# Patient Record
Sex: FEMALE | Race: BLACK OR AFRICAN AMERICAN | NOT HISPANIC OR LATINO | Employment: FULL TIME | URBAN - METROPOLITAN AREA
[De-identification: names, ages, dates, MRNs, and addresses within clinical notes are randomized per-mention and may not be internally consistent; named-entity substitution may affect disease eponyms.]

---

## 2018-03-12 ENCOUNTER — TRANSCRIBE ORDERS (OUTPATIENT)
Dept: ADMINISTRATIVE | Facility: HOSPITAL | Age: 48
End: 2018-03-12

## 2018-03-12 DIAGNOSIS — N63.0 BREAST LUMP: Primary | ICD-10-CM

## 2018-03-28 ENCOUNTER — HOSPITAL ENCOUNTER (OUTPATIENT)
Dept: MAMMOGRAPHY | Facility: HOSPITAL | Age: 48
Discharge: HOME | End: 2018-03-28

## 2018-03-28 ENCOUNTER — HOSPITAL ENCOUNTER (OUTPATIENT)
Dept: ULTRASOUND IMAGING | Facility: HOSPITAL | Age: 48
Discharge: HOME | End: 2018-03-28

## 2018-03-28 ENCOUNTER — HOSPITAL ENCOUNTER (OUTPATIENT)
Dept: MAMMOGRAPHY | Facility: HOSPITAL | Age: 48
Discharge: HOME | End: 2018-03-28
Attending: OBSTETRICS & GYNECOLOGY | Admitting: OBSTETRICS & GYNECOLOGY

## 2018-03-28 DIAGNOSIS — N63.0 BREAST LUMP: ICD-10-CM

## 2018-03-28 PROCEDURE — A4648 IMPLANTABLE TISSUE MARKER: HCPCS

## 2018-03-28 PROCEDURE — 88360 TUMOR IMMUNOHISTOCHEM/MANUAL: CPT | Performed by: OBSTETRICS & GYNECOLOGY

## 2018-03-28 PROCEDURE — 88342 IMHCHEM/IMCYTCHM 1ST ANTB: CPT | Performed by: OBSTETRICS & GYNECOLOGY

## 2018-03-28 PROCEDURE — 19084 BX BREAST ADD LESION US IMAG: CPT | Performed by: RADIOLOGY

## 2018-03-28 PROCEDURE — 19083 BX BREAST 1ST LESION US IMAG: CPT | Performed by: RADIOLOGY

## 2018-03-28 PROCEDURE — G0279 TOMOSYNTHESIS, MAMMO: HCPCS

## 2018-03-28 PROCEDURE — 88305 TISSUE EXAM BY PATHOLOGIST: CPT | Performed by: OBSTETRICS & GYNECOLOGY

## 2018-03-28 PROCEDURE — 76642 ULTRASOUND BREAST LIMITED: CPT

## 2018-03-28 PROCEDURE — 77066 DX MAMMO INCL CAD BI: CPT | Performed by: RADIOLOGY

## 2018-03-28 PROCEDURE — 77066 DX MAMMO INCL CAD BI: CPT

## 2018-03-28 PROCEDURE — 77062 BREAST TOMOSYNTHESIS BI: CPT | Performed by: RADIOLOGY

## 2018-03-28 PROCEDURE — 76642 ULTRASOUND BREAST LIMITED: CPT | Performed by: RADIOLOGY

## 2018-03-28 RX ORDER — LIDOCAINE HYDROCHLORIDE 10 MG/ML
5 INJECTION, SOLUTION INFILTRATION; PERINEURAL ONCE
Status: COMPLETED | OUTPATIENT
Start: 2018-03-28 | End: 2018-03-28

## 2018-03-28 RX ORDER — LIDOCAINE HYDROCHLORIDE AND EPINEPHRINE 10; 10 MG/ML; UG/ML
10 INJECTION, SOLUTION INFILTRATION; PERINEURAL ONCE
Status: DISCONTINUED | OUTPATIENT
Start: 2018-03-28 | End: 2018-05-15

## 2018-03-28 RX ADMIN — LIDOCAINE HYDROCHLORIDE 1.5 ML: 10 INJECTION, SOLUTION INFILTRATION; PERINEURAL at 12:11

## 2018-03-28 RX ADMIN — LIDOCAINE HYDROCHLORIDE 4 ML: 10 INJECTION, SOLUTION INFILTRATION; PERINEURAL at 12:18

## 2018-03-30 LAB
CYTO UR: NORMAL
CYTO UR: NORMAL
LAB AP CASE REPORT: NORMAL
LAB AP CASE REPORT: NORMAL
LAB AP CLINICAL INFORMATION: NORMAL
LAB AP CLINICAL INFORMATION: NORMAL
LAB AP DIAGNOSIS COMMENT: NORMAL
LAB AP DIAGNOSIS COMMENT: NORMAL
LAB AP SPECIAL STAINS: NORMAL
LAB AP SPECIAL STAINS: NORMAL
Lab: NORMAL
Lab: NORMAL
PATH REPORT.FINAL DX SPEC: NORMAL
PATH REPORT.FINAL DX SPEC: NORMAL
PATH REPORT.GROSS SPEC: NORMAL
PATH REPORT.GROSS SPEC: NORMAL

## 2018-04-02 ENCOUNTER — DOCUMENTATION (OUTPATIENT)
Dept: OTHER | Facility: HOSPITAL | Age: 48
End: 2018-04-02

## 2018-04-02 NOTE — PROGRESS NOTES
I called patient twice today @ 1320 and 1609. I left message on her answering machine both times that I was calling to review her pathology results from her breast biopsy and I left my number for her to return the call.

## 2018-04-03 ENCOUNTER — DOCUMENTATION (OUTPATIENT)
Dept: OTHER | Facility: HOSPITAL | Age: 48
End: 2018-04-03

## 2018-04-03 NOTE — PROGRESS NOTES
Patient called on the Nurse Navigation Help Line and said that she was returning Oneida Owens's call regarding her results. I took patient's name and number and let her know that Oneida was not the office at this time. I told patient that I would give her this message and pt verbalized understanding. EUSEBIO

## 2018-04-03 NOTE — PROGRESS NOTES
I called patient and was able to get in touch with her. I told her the result of her left breast biopsy was Invasive Lobular Carcinoma. ER/CO positive and HER 2 negative.  She has history of right breast cancer 2000. She was treated in [facility] with BCT - she states that she had 14 lymph nodes removed and received XRT and chemotherapy. She has not had genetic testing. I asked if she had a surgeon in mind she would like to see and she stated Dr. Keven Solitario's. She states that she is off Thursday and Fridays but that she could come other days of the week if she knew ahead of time. I notified Wesley Recinos of patients wish to see Dr. Solitario's.  The patient states that the biopsy site is still sore but that she kept the ice fidencio on for 2 days. She denies any s/s/ of infections - denies temperature. I gave patient my number should she have any other concerns or questions.

## 2018-04-03 NOTE — PROGRESS NOTES
Patient will see genetics on the same day she sees Dr Holley per her request - 9AM on 4/16 for genetics and 11AM 4/16 to see Dr. Holley

## 2018-04-16 ENCOUNTER — APPOINTMENT (OUTPATIENT)
Dept: LAB | Facility: HOSPITAL | Age: 48
End: 2018-04-16

## 2018-04-16 ENCOUNTER — CLINICAL SUPPORT (OUTPATIENT)
Dept: GENETICS | Facility: HOSPITAL | Age: 48
End: 2018-04-16

## 2018-04-16 ENCOUNTER — DOCUMENTATION (OUTPATIENT)
Dept: OTHER | Facility: HOSPITAL | Age: 48
End: 2018-04-16

## 2018-04-16 DIAGNOSIS — Z85.3 HISTORY OF BREAST CANCER: Primary | ICD-10-CM

## 2018-04-16 DIAGNOSIS — Z13.79 GENETIC TESTING: Primary | ICD-10-CM

## 2018-04-16 PROCEDURE — 96040: CPT | Performed by: GENETIC COUNSELOR, MS

## 2018-04-16 NOTE — PROGRESS NOTES
Tia Sommers is a 48-year old female who was seen for genetic counseling due to a personal history of breast cancer.  Ms. Sommers was recently diagnosed with an invasive lobular breast cancer of the left breast at age 48.   She was previously diagnosed with an invasive breast cancer o the right breast at age 29.  At that time, she underwent lumpectomy, radiation and chemotherapy.  Ms. Sommers retains her uterus and ovaries and is nulliparous.  Ms. Sommers was interested in discussing her risk for a hereditary cancer syndrome, and decided to pursue genetic testing.   Ms. Sommers opted to pursue comprehensive testing via the BreastNext panel ordered through [employer] which includes 17 genes that are known to increase the risk of breast cancer (ALEXSANDRA, BARD1, BRCA1, BRCA2, BRIP1, CDH1, CHEK2, NBN, NF1, MRE11A, MUTYH, PALB2, PTEN, RAD50, RAD51C, RAD51D, TP53). Results are expected in 2-3 weeks.    FAMILY HISTORY:  Pat. 1st cousin:   Unknown primary, 40s  Pat. 1st cousin, once-removed: Breast cancer, 40s    RISK ASSESSMENT:  Ms. Sommers’s personal history of early onset breast cancer raises the question of a hereditary cancer syndrome.  We discussed BRCA1/2 testing as well as the option of pursuing a panel that would test for other genes known to impact breast cancer risk in addition to BRCA1/2.   Ms. Sommers clearly meets NCCN guidelines criteria for BRCA1/2 testing based on her age at diagnosis. This risk assessment is based on the family history information provided at the time of the appointment.  The assessment could change in the future should new information be obtained.    GENETIC COUNSELING (40 minutes): We reviewed the family history information in detail.  Cases of breast cancer follow three general patterns: sporadic, familial, and hereditary.  While most cancer is sporadic, some cases appear to occur in family clusters.  These cases are said to be familial and account for 10-20% of breast cancer cases.   Familial cases may be due to a combination of shared genes and environmental factors among family members.  In even fewer families, the cancer is said to be inherited, and the genes responsible for the cancer are known.      Family histories typical of hereditary cancer syndromes usually include multiple first- and second-degree relatives diagnosed with cancer types that define a syndrome.  These cases tend to be diagnosed at younger-than-expected ages and can be bilateral or multifocal.  The cancer in these families follows an autosomal dominant inheritance pattern, which indicates the likely presence of a mutation in a cancer susceptibility gene.  Children and siblings of an individual believed to carry this mutation have a 50% chance of inheriting that mutation, thereby inheriting the increased risk to develop cancer.  These mutations can be passed down from the maternal or the paternal lineage.    Hereditary breast cancer accounts for 5-10% of all cases of breast cancer.  A significant proportion of hereditary breast cancer can be attributed to mutations in the BRCA1 and BRCA2 genes.  Mutations in these genes confer an increased risk for breast cancer, ovarian cancer, male breast cancer, prostate cancer and pancreatic cancer.  Women with a BRCA1 or BRCA2 mutation who have already been diagnosed with breast cancer have a 40-60% lifetime risk of a second breast cancer.     There are other genes that are known to be associated with an increased risk for breast cancer.  Some of these genes have well defined cancer risks and established management guidelines.  Other genes that can be tested for have been more recently described, and there may be less data regarding the risks and therefore may not have established management guidelines.  Based on Ms. Sommers’s desire to get as much information as possible regarding her personal risks and potential risks for her family, she opted to pursue testing through a panel that  would look at several other genes known to increase the risk for breast cancer.    GENETIC TESTING:  The risks, benefits and limitations of genetic testing and implications for clinical management following testing were reviewed.  DNA test results can influence decisions regarding screening, prevention and surgical management.  Genetic testing can have significant psychological implications for both individuals and families.  Also discussed was the possibility of employment and insurance discrimination based on genetic test results and the laws in place to prevent this.    We discussed panel testing, which would involve testing for BRCA1/2 as well as several other genes at the same time (other genes include ALEXSANDRA, BARD1, BRIP1, CDH1, CHEK2, NBN, NF1, MRE11A, MUTYH, PALB2, PTEN, RAD50, RAD51C, RAD51D, TP53). The benefits and limitations of genetic testing were discussed and Ms. Sommers decided to pursue testing via the panel. The implications of a positive or negative test result were discussed. We discussed the possibility that, in some cases, genetic test results may be informative or may be ambiguous due to the identification of a genetic variant. These variants may or may not be associated with an increased cancer risk.  Given her personal history, a negative test result would not eliminate all breast cancer risk to her relatives, although the risk would not be as high as it would with positive genetic testing.        PLAN: Results are expected in 2-3 weeks.  Ms. Sommers is welcome to contact us in the meantime at [phone] with any questions she may have.      Nuvia Guzman MS, [facility] [place]  Licensed Certified Genetic Counselor

## 2018-04-16 NOTE — PROGRESS NOTES
I saw patient with Dr Solitario's - Dr Solitario's reviewed pathology report and surgical options - left breast Stage IIA, ILC, ER/NY positive and Her 2 negative. The patient has a history of right breast - [facility], chemotherapy, XRT and Axillary dissection. She states today that she wants bilateral mastecomies and states that she does not want reconstruction but agrees to consult with plastic surgeon. She has appointment to see Dr. Moses 4/25/18. She saw genetics today - she will see [facility] for Nas [place] tomorrow 4/17/18 for surgical clearance. I measured her left arm- she does have lymph edema in right arm and wears a sleeve. I reviewed the educational and supportive materials with the patient.

## 2018-05-04 ENCOUNTER — DOCUMENTATION (OUTPATIENT)
Dept: OTHER | Facility: HOSPITAL | Age: 48
End: 2018-05-04

## 2018-05-04 NOTE — PROGRESS NOTES
Patient called yesterday to say she has not heard from [facility] about a surgery date - I spoke to Dr Solitario's and he said he would look in to it. Patient aware - I told her if she did not hear from [facility] to call me.

## 2018-05-04 NOTE — PROGRESS NOTES
Patient called to say that she has not heard from [facility] about a surgery date - I called and left message with Catie Schilling to let me know.

## 2018-05-14 ENCOUNTER — DOCUMENTATION (OUTPATIENT)
Dept: OTHER | Facility: HOSPITAL | Age: 48
End: 2018-05-14

## 2018-05-14 NOTE — PROGRESS NOTES
Catie Schilling called to say that she has not been able to contact patient on her cell phone - she has left messages but patient has not returned her calls. I tried patients cell number and left a message to call me regarding her date for surgery. I also called patients work number and person that answered said that Tia uses her cell phone number for work. I then called patients emergency contact number and left message for patient to call.

## 2018-05-15 ENCOUNTER — TRANSCRIBE ORDERS (OUTPATIENT)
Dept: ADMINISTRATIVE | Facility: HOSPITAL | Age: 48
End: 2018-05-15

## 2018-05-15 ENCOUNTER — ANESTHESIA EVENT (OUTPATIENT)
Dept: PERIOP | Facility: HOSPITAL | Age: 48
End: 2018-05-15

## 2018-05-15 ENCOUNTER — APPOINTMENT (OUTPATIENT)
Dept: PREADMISSION TESTING | Facility: HOSPITAL | Age: 48
End: 2018-05-15

## 2018-05-15 VITALS — BODY MASS INDEX: 25.55 KG/M2 | WEIGHT: 159 LBS | HEIGHT: 66 IN

## 2018-05-15 DIAGNOSIS — C50.512 MALIGNANT NEOPLASM OF LOWER-OUTER QUADRANT OF LEFT FEMALE BREAST, UNSPECIFIED ESTROGEN RECEPTOR STATUS (HCC): Primary | ICD-10-CM

## 2018-05-15 LAB
ALBUMIN SERPL-MCNC: 4 G/DL (ref 3.2–4.8)
ALBUMIN/GLOB SERPL: 1.3 G/DL (ref 1.5–2.5)
ALP SERPL-CCNC: 72 U/L (ref 25–100)
ALT SERPL W P-5'-P-CCNC: 79 U/L (ref 7–40)
ANION GAP SERPL CALCULATED.3IONS-SCNC: 12 MMOL/L (ref 3–11)
AST SERPL-CCNC: 55 U/L (ref 0–33)
BILIRUB SERPL-MCNC: 1.1 MG/DL (ref 0.3–1.2)
BUN BLD-MCNC: 15 MG/DL (ref 9–23)
BUN/CREAT SERPL: 18.8 (ref 7–25)
CALCIUM SPEC-SCNC: 8.5 MG/DL (ref 8.7–10.4)
CHLORIDE SERPL-SCNC: 103 MMOL/L (ref 99–109)
CO2 SERPL-SCNC: 27 MMOL/L (ref 20–31)
CREAT BLD-MCNC: 0.8 MG/DL (ref 0.6–1.3)
DEPRECATED RDW RBC AUTO: 46.3 FL (ref 37–54)
ERYTHROCYTE [DISTWIDTH] IN BLOOD BY AUTOMATED COUNT: 14.9 % (ref 11.3–14.5)
GFR SERPL CREATININE-BSD FRML MDRD: 76 ML/MIN/1.73
GFR SERPL CREATININE-BSD FRML MDRD: 92 ML/MIN/1.73
GLOBULIN UR ELPH-MCNC: 3 GM/DL
GLUCOSE BLD-MCNC: 101 MG/DL (ref 70–100)
HBA1C MFR BLD: 5.2 % (ref 4.8–5.6)
HCT VFR BLD AUTO: 37.8 % (ref 34.5–44)
HGB BLD-MCNC: 12.4 G/DL (ref 11.5–15.5)
MCH RBC QN AUTO: 27.8 PG (ref 27–31)
MCHC RBC AUTO-ENTMCNC: 32.8 G/DL (ref 32–36)
MCV RBC AUTO: 84.8 FL (ref 80–99)
PLATELET # BLD AUTO: 253 10*3/MM3 (ref 150–450)
PMV BLD AUTO: 9.4 FL (ref 6–12)
POTASSIUM BLD-SCNC: 3.7 MMOL/L (ref 3.5–5.5)
PROT SERPL-MCNC: 7 G/DL (ref 5.7–8.2)
RBC # BLD AUTO: 4.46 10*6/MM3 (ref 3.89–5.14)
SODIUM BLD-SCNC: 142 MMOL/L (ref 132–146)
WBC NRBC COR # BLD: 4.02 10*3/MM3 (ref 3.5–10.8)

## 2018-05-15 PROCEDURE — 85027 COMPLETE CBC AUTOMATED: CPT | Performed by: SURGERY

## 2018-05-15 PROCEDURE — 83036 HEMOGLOBIN GLYCOSYLATED A1C: CPT | Performed by: SURGERY

## 2018-05-15 PROCEDURE — 80053 COMPREHEN METABOLIC PANEL: CPT | Performed by: SURGERY

## 2018-05-15 PROCEDURE — 36415 COLL VENOUS BLD VENIPUNCTURE: CPT

## 2018-05-15 RX ORDER — NAPROXEN SODIUM 220 MG
440 TABLET ORAL 2 TIMES DAILY PRN
COMMUNITY

## 2018-05-15 NOTE — DISCHARGE INSTRUCTIONS

## 2018-05-15 NOTE — PAT
Notified Esteban in surgery scheduling about pt latex allergy.     Patient instructed to remove all jewelry for procedure.  Patient was instructed that if unable to remove jewelry especially rings to request assistance from a jeweler. Explained that if the piece of jewelry could not be removed before arrival to preop that it will be cut off.  Reinforced with patient that all jewelry must be removed for safety reasons that taping a ring was not an option.  Patient verbalized understanding.    It was noted during Pre Admission Testing that patient was wearing some form of fingernail polish (gel/regular) and/or acrylic/artificial nails.  Patient was told that polish and/or artificial nails must be removed for surgery.  If a patient had recent manicure, and would rather not remove polish or artificial nails. Then the minimum requirement is that the polish/artificial nails must be removed from the middle finger on each hand.  Patient verbalized understanding.    If patient was having surgery on an upper extremity, then the patient was instructed that fingernail polish/artificial fingernails must be removed for surgery.  NO EXCEPTIONS.  Patient verbalized understanding.    If patient was having surgery on a lower extremity, then the patient was instructed that toenail polish on both extremities must be removed for surgery.  NO EXCEPTIONS. Patient verbalized understanding.

## 2018-05-16 ENCOUNTER — HOSPITAL ENCOUNTER (OUTPATIENT)
Dept: NUCLEAR MEDICINE | Facility: HOSPITAL | Age: 48
Discharge: HOME | End: 2018-05-16
Attending: SURGERY

## 2018-05-16 ENCOUNTER — ANESTHESIA (OUTPATIENT)
Dept: PERIOP | Facility: HOSPITAL | Age: 48
End: 2018-05-16

## 2018-05-16 ENCOUNTER — HOSPITAL ENCOUNTER (OUTPATIENT)
Facility: HOSPITAL | Age: 48
Discharge: HOME | End: 2018-05-17
Attending: SURGERY | Admitting: SURGERY

## 2018-05-16 DIAGNOSIS — C50.512 MALIGNANT NEOPLASM OF LOWER-OUTER QUADRANT OF LEFT FEMALE BREAST, UNSPECIFIED ESTROGEN RECEPTOR STATUS (HCC): ICD-10-CM

## 2018-05-16 DIAGNOSIS — C50.912 BREAST CANCER, LEFT (HCC): ICD-10-CM

## 2018-05-16 LAB
B-HCG UR QL: NEGATIVE
INTERNAL NEGATIVE CONTROL: NEGATIVE
INTERNAL POSITIVE CONTROL: POSITIVE
Lab: NORMAL

## 2018-05-16 PROCEDURE — 25010000002 DEXAMETHASONE PER 1 MG: Performed by: NURSE ANESTHETIST, CERTIFIED REGISTERED

## 2018-05-16 PROCEDURE — 25010000002 ONDANSETRON PER 1 MG: Performed by: NURSE ANESTHETIST, CERTIFIED REGISTERED

## 2018-05-16 PROCEDURE — 25010000002 NEOSTIGMINE 10 MG/10ML SOLUTION: Performed by: NURSE ANESTHETIST, CERTIFIED REGISTERED

## 2018-05-16 PROCEDURE — 25010000002 PROPOFOL 10 MG/ML EMULSION: Performed by: NURSE ANESTHETIST, CERTIFIED REGISTERED

## 2018-05-16 PROCEDURE — 25010000002 PROPOFOL 1000 MG/ML EMULSION: Performed by: NURSE ANESTHETIST, CERTIFIED REGISTERED

## 2018-05-16 PROCEDURE — 0 TECHNETIUM FILTERED SULFUR COLLOID: Performed by: SURGERY

## 2018-05-16 PROCEDURE — 25010000002 PHENYLEPHRINE PER 1 ML: Performed by: NURSE ANESTHETIST, CERTIFIED REGISTERED

## 2018-05-16 PROCEDURE — 88307 TISSUE EXAM BY PATHOLOGIST: CPT | Performed by: SURGERY

## 2018-05-16 PROCEDURE — 25010000002 DEXAMETHASONE SODIUM PHOSPHATE 10 MG/ML SOLUTION 1 ML VIAL: Performed by: NURSE ANESTHETIST, CERTIFIED REGISTERED

## 2018-05-16 PROCEDURE — 25010000002 FENTANYL CITRATE (PF) 100 MCG/2ML SOLUTION: Performed by: NURSE ANESTHETIST, CERTIFIED REGISTERED

## 2018-05-16 PROCEDURE — G0378 HOSPITAL OBSERVATION PER HR: HCPCS

## 2018-05-16 PROCEDURE — 88331 PATH CONSLTJ SURG 1 BLK 1SPC: CPT | Performed by: PATHOLOGY

## 2018-05-16 PROCEDURE — 38792 RA TRACER ID OF SENTINL NODE: CPT

## 2018-05-16 PROCEDURE — 25010000002 HYDROMORPHONE PER 4 MG: Performed by: SURGERY

## 2018-05-16 PROCEDURE — 25010000002 VANCOMYCIN PER 500 MG: Performed by: SURGERY

## 2018-05-16 PROCEDURE — A9541 TC99M SULFUR COLLOID: HCPCS | Performed by: SURGERY

## 2018-05-16 PROCEDURE — 25010000002 BUPRENORPHINE PER 0.1 MG: Performed by: NURSE ANESTHETIST, CERTIFIED REGISTERED

## 2018-05-16 PROCEDURE — 88332 PATH CONSLTJ SURG EA ADD BLK: CPT | Performed by: PATHOLOGY

## 2018-05-16 RX ORDER — PROMETHAZINE HYDROCHLORIDE 25 MG/1
25 TABLET ORAL ONCE AS NEEDED
Status: DISCONTINUED | OUTPATIENT
Start: 2018-05-16 | End: 2018-05-16 | Stop reason: HOSPADM

## 2018-05-16 RX ORDER — LORAZEPAM 0.5 MG/1
0.5 TABLET ORAL EVERY 8 HOURS PRN
Status: DISCONTINUED | OUTPATIENT
Start: 2018-05-16 | End: 2018-05-17 | Stop reason: HOSPADM

## 2018-05-16 RX ORDER — SODIUM CHLORIDE 9 MG/ML
50 INJECTION, SOLUTION INTRAVENOUS CONTINUOUS
Status: DISCONTINUED | OUTPATIENT
Start: 2018-05-16 | End: 2018-05-17 | Stop reason: HOSPADM

## 2018-05-16 RX ORDER — VANCOMYCIN HYDROCHLORIDE 1 G/200ML
15 INJECTION, SOLUTION INTRAVENOUS
Status: COMPLETED | OUTPATIENT
Start: 2018-05-16 | End: 2018-05-16

## 2018-05-16 RX ORDER — SODIUM CHLORIDE, SODIUM LACTATE, POTASSIUM CHLORIDE, CALCIUM CHLORIDE 600; 310; 30; 20 MG/100ML; MG/100ML; MG/100ML; MG/100ML
9 INJECTION, SOLUTION INTRAVENOUS CONTINUOUS
Status: DISCONTINUED | OUTPATIENT
Start: 2018-05-16 | End: 2018-05-16

## 2018-05-16 RX ORDER — GLYCOPYRROLATE 0.2 MG/ML
INJECTION INTRAMUSCULAR; INTRAVENOUS AS NEEDED
Status: DISCONTINUED | OUTPATIENT
Start: 2018-05-16 | End: 2018-05-16 | Stop reason: SURG

## 2018-05-16 RX ORDER — FAMOTIDINE 20 MG/1
20 TABLET, FILM COATED ORAL ONCE
Status: COMPLETED | OUTPATIENT
Start: 2018-05-16 | End: 2018-05-16

## 2018-05-16 RX ORDER — MAGNESIUM HYDROXIDE 1200 MG/15ML
LIQUID ORAL AS NEEDED
Status: DISCONTINUED | OUTPATIENT
Start: 2018-05-16 | End: 2018-05-16 | Stop reason: HOSPADM

## 2018-05-16 RX ORDER — HYDROMORPHONE HYDROCHLORIDE 1 MG/ML
0.5 INJECTION, SOLUTION INTRAMUSCULAR; INTRAVENOUS; SUBCUTANEOUS
Status: DISCONTINUED | OUTPATIENT
Start: 2018-05-16 | End: 2018-05-16 | Stop reason: HOSPADM

## 2018-05-16 RX ORDER — OXYCODONE HYDROCHLORIDE 5 MG/1
10 TABLET ORAL EVERY 4 HOURS PRN
Status: DISCONTINUED | OUTPATIENT
Start: 2018-05-16 | End: 2018-05-17 | Stop reason: HOSPADM

## 2018-05-16 RX ORDER — CELECOXIB 200 MG/1
200 CAPSULE ORAL EVERY 12 HOURS
Status: DISCONTINUED | OUTPATIENT
Start: 2018-05-16 | End: 2018-05-17 | Stop reason: HOSPADM

## 2018-05-16 RX ORDER — NALOXONE HCL 0.4 MG/ML
0.1 VIAL (ML) INJECTION
Status: DISCONTINUED | OUTPATIENT
Start: 2018-05-16 | End: 2018-05-17 | Stop reason: HOSPADM

## 2018-05-16 RX ORDER — LIDOCAINE HYDROCHLORIDE 10 MG/ML
0.5 INJECTION, SOLUTION EPIDURAL; INFILTRATION; INTRACAUDAL; PERINEURAL ONCE AS NEEDED
Status: COMPLETED | OUTPATIENT
Start: 2018-05-16 | End: 2018-05-16

## 2018-05-16 RX ORDER — SODIUM CHLORIDE 0.9 % (FLUSH) 0.9 %
1-10 SYRINGE (ML) INJECTION AS NEEDED
Status: DISCONTINUED | OUTPATIENT
Start: 2018-05-16 | End: 2018-05-16 | Stop reason: HOSPADM

## 2018-05-16 RX ORDER — HYDROMORPHONE HYDROCHLORIDE 1 MG/ML
0.5 INJECTION, SOLUTION INTRAMUSCULAR; INTRAVENOUS; SUBCUTANEOUS
Status: DISCONTINUED | OUTPATIENT
Start: 2018-05-16 | End: 2018-05-17 | Stop reason: HOSPADM

## 2018-05-16 RX ORDER — DIPHENHYDRAMINE HYDROCHLORIDE 50 MG/ML
12.5 INJECTION INTRAMUSCULAR; INTRAVENOUS EVERY 6 HOURS PRN
Status: DISCONTINUED | OUTPATIENT
Start: 2018-05-16 | End: 2018-05-17 | Stop reason: HOSPADM

## 2018-05-16 RX ORDER — ACETAMINOPHEN 500 MG
1000 TABLET ORAL EVERY 6 HOURS
Status: DISCONTINUED | OUTPATIENT
Start: 2018-05-16 | End: 2018-05-17 | Stop reason: HOSPADM

## 2018-05-16 RX ORDER — PROMETHAZINE HYDROCHLORIDE 25 MG/ML
6.25 INJECTION, SOLUTION INTRAMUSCULAR; INTRAVENOUS ONCE AS NEEDED
Status: DISCONTINUED | OUTPATIENT
Start: 2018-05-16 | End: 2018-05-16 | Stop reason: HOSPADM

## 2018-05-16 RX ORDER — MEPERIDINE HYDROCHLORIDE 25 MG/ML
12.5 INJECTION INTRAMUSCULAR; INTRAVENOUS; SUBCUTANEOUS
Status: DISCONTINUED | OUTPATIENT
Start: 2018-05-16 | End: 2018-05-16 | Stop reason: HOSPADM

## 2018-05-16 RX ORDER — ATRACURIUM BESYLATE 10 MG/ML
INJECTION, SOLUTION INTRAVENOUS AS NEEDED
Status: DISCONTINUED | OUTPATIENT
Start: 2018-05-16 | End: 2018-05-16 | Stop reason: SURG

## 2018-05-16 RX ORDER — ESMOLOL HYDROCHLORIDE 10 MG/ML
INJECTION INTRAVENOUS AS NEEDED
Status: DISCONTINUED | OUTPATIENT
Start: 2018-05-16 | End: 2018-05-16 | Stop reason: SURG

## 2018-05-16 RX ORDER — SCOLOPAMINE TRANSDERMAL SYSTEM 1 MG/1
1 PATCH, EXTENDED RELEASE TRANSDERMAL ONCE
Status: DISCONTINUED | OUTPATIENT
Start: 2018-05-16 | End: 2018-05-16

## 2018-05-16 RX ORDER — TRAMADOL HYDROCHLORIDE 50 MG/1
50 TABLET ORAL EVERY 6 HOURS PRN
Status: DISCONTINUED | OUTPATIENT
Start: 2018-05-16 | End: 2018-05-17 | Stop reason: HOSPADM

## 2018-05-16 RX ORDER — PROPOFOL 10 MG/ML
VIAL (ML) INTRAVENOUS AS NEEDED
Status: DISCONTINUED | OUTPATIENT
Start: 2018-05-16 | End: 2018-05-16 | Stop reason: SURG

## 2018-05-16 RX ORDER — PROMETHAZINE HYDROCHLORIDE 25 MG/1
25 SUPPOSITORY RECTAL ONCE AS NEEDED
Status: DISCONTINUED | OUTPATIENT
Start: 2018-05-16 | End: 2018-05-16 | Stop reason: HOSPADM

## 2018-05-16 RX ORDER — IBUPROFEN 400 MG/1
400 TABLET ORAL EVERY 6 HOURS PRN
Status: DISCONTINUED | OUTPATIENT
Start: 2018-05-16 | End: 2018-05-17 | Stop reason: HOSPADM

## 2018-05-16 RX ORDER — ONDANSETRON 2 MG/ML
INJECTION INTRAMUSCULAR; INTRAVENOUS AS NEEDED
Status: DISCONTINUED | OUTPATIENT
Start: 2018-05-16 | End: 2018-05-16 | Stop reason: SURG

## 2018-05-16 RX ORDER — FENTANYL CITRATE 50 UG/ML
50 INJECTION, SOLUTION INTRAMUSCULAR; INTRAVENOUS
Status: DISCONTINUED | OUTPATIENT
Start: 2018-05-16 | End: 2018-05-16 | Stop reason: HOSPADM

## 2018-05-16 RX ORDER — PROMETHAZINE HYDROCHLORIDE 25 MG/ML
6.25 INJECTION, SOLUTION INTRAMUSCULAR; INTRAVENOUS EVERY 6 HOURS PRN
Status: DISCONTINUED | OUTPATIENT
Start: 2018-05-16 | End: 2018-05-17 | Stop reason: HOSPADM

## 2018-05-16 RX ORDER — IPRATROPIUM BROMIDE AND ALBUTEROL SULFATE 2.5; .5 MG/3ML; MG/3ML
3 SOLUTION RESPIRATORY (INHALATION) ONCE AS NEEDED
Status: DISCONTINUED | OUTPATIENT
Start: 2018-05-16 | End: 2018-05-16 | Stop reason: HOSPADM

## 2018-05-16 RX ORDER — PREGABALIN 75 MG/1
75 CAPSULE ORAL ONCE
Status: COMPLETED | OUTPATIENT
Start: 2018-05-16 | End: 2018-05-16

## 2018-05-16 RX ORDER — NEOSTIGMINE METHYLSULFATE 1 MG/ML
INJECTION, SOLUTION INTRAVENOUS AS NEEDED
Status: DISCONTINUED | OUTPATIENT
Start: 2018-05-16 | End: 2018-05-16 | Stop reason: SURG

## 2018-05-16 RX ORDER — ONDANSETRON 2 MG/ML
4 INJECTION INTRAMUSCULAR; INTRAVENOUS EVERY 6 HOURS PRN
Status: DISCONTINUED | OUTPATIENT
Start: 2018-05-16 | End: 2018-05-17 | Stop reason: HOSPADM

## 2018-05-16 RX ORDER — LIDOCAINE HYDROCHLORIDE 10 MG/ML
INJECTION, SOLUTION EPIDURAL; INFILTRATION; INTRACAUDAL; PERINEURAL AS NEEDED
Status: DISCONTINUED | OUTPATIENT
Start: 2018-05-16 | End: 2018-05-16 | Stop reason: SURG

## 2018-05-16 RX ORDER — DEXAMETHASONE SODIUM PHOSPHATE 4 MG/ML
INJECTION, SOLUTION INTRA-ARTICULAR; INTRALESIONAL; INTRAMUSCULAR; INTRAVENOUS; SOFT TISSUE AS NEEDED
Status: DISCONTINUED | OUTPATIENT
Start: 2018-05-16 | End: 2018-05-16 | Stop reason: SURG

## 2018-05-16 RX ADMIN — PHENYLEPHRINE HYDROCHLORIDE 80 MCG: 10 INJECTION INTRAVENOUS at 08:31

## 2018-05-16 RX ADMIN — PREGABALIN 75 MG: 75 CAPSULE ORAL at 06:26

## 2018-05-16 RX ADMIN — DEXAMETHASONE SODIUM PHOSPHATE 61.4 ML: 10 INJECTION, SOLUTION INTRAMUSCULAR; INTRAVENOUS at 07:24

## 2018-05-16 RX ADMIN — SCOPALAMINE 1 PATCH: 1 PATCH, EXTENDED RELEASE TRANSDERMAL at 06:26

## 2018-05-16 RX ADMIN — FENTANYL CITRATE 50 MCG: 50 INJECTION, SOLUTION INTRAMUSCULAR; INTRAVENOUS at 11:05

## 2018-05-16 RX ADMIN — LIDOCAINE HYDROCHLORIDE 0.5 ML: 10 INJECTION, SOLUTION EPIDURAL; INFILTRATION; INTRACAUDAL; PERINEURAL at 06:27

## 2018-05-16 RX ADMIN — PROPOFOL: 10 INJECTION, EMULSION INTRAVENOUS at 08:10

## 2018-05-16 RX ADMIN — VANCOMYCIN HYDROCHLORIDE 1 G: 1 INJECTION, SOLUTION INTRAVENOUS at 07:25

## 2018-05-16 RX ADMIN — LIDOCAINE HYDROCHLORIDE 50 MG: 10 INJECTION, SOLUTION EPIDURAL; INFILTRATION; INTRACAUDAL; PERINEURAL at 07:22

## 2018-05-16 RX ADMIN — ACETAMINOPHEN 1000 MG: 500 TABLET, FILM COATED ORAL at 13:06

## 2018-05-16 RX ADMIN — ACETAMINOPHEN 1000 MG: 500 TABLET, FILM COATED ORAL at 18:01

## 2018-05-16 RX ADMIN — ATRACURIUM BESYLATE 30 MG: 10 INJECTION, SOLUTION INTRAVENOUS at 07:22

## 2018-05-16 RX ADMIN — DEXAMETHASONE SODIUM PHOSPHATE 6 MG: 4 INJECTION, SOLUTION INTRAMUSCULAR; INTRAVENOUS at 07:30

## 2018-05-16 RX ADMIN — GLYCOPYRROLATE 0.3 MG: 0.2 INJECTION, SOLUTION INTRAMUSCULAR; INTRAVENOUS at 09:59

## 2018-05-16 RX ADMIN — PROPOFOL 150 MCG/KG/MIN: 10 INJECTION, EMULSION INTRAVENOUS at 07:25

## 2018-05-16 RX ADMIN — SODIUM CHLORIDE, POTASSIUM CHLORIDE, SODIUM LACTATE AND CALCIUM CHLORIDE 9 ML/HR: 600; 310; 30; 20 INJECTION, SOLUTION INTRAVENOUS at 06:44

## 2018-05-16 RX ADMIN — PHENYLEPHRINE HYDROCHLORIDE 80 MCG: 10 INJECTION INTRAVENOUS at 08:18

## 2018-05-16 RX ADMIN — HYDROMORPHONE HYDROCHLORIDE 0.5 MG: 1 INJECTION, SOLUTION INTRAMUSCULAR; INTRAVENOUS; SUBCUTANEOUS at 14:39

## 2018-05-16 RX ADMIN — SODIUM CHLORIDE, POTASSIUM CHLORIDE, SODIUM LACTATE AND CALCIUM CHLORIDE: 600; 310; 30; 20 INJECTION, SOLUTION INTRAVENOUS at 09:55

## 2018-05-16 RX ADMIN — NEOSTIGMINE METHYLSULFATE 2 MG: 1 INJECTION, SOLUTION INTRAVENOUS at 09:59

## 2018-05-16 RX ADMIN — PROPOFOL 150 MG: 10 INJECTION, EMULSION INTRAVENOUS at 07:22

## 2018-05-16 RX ADMIN — PROPOFOL: 10 INJECTION, EMULSION INTRAVENOUS at 09:09

## 2018-05-16 RX ADMIN — ONDANSETRON 4 MG: 2 INJECTION INTRAMUSCULAR; INTRAVENOUS at 09:49

## 2018-05-16 RX ADMIN — CELECOXIB 200 MG: 200 CAPSULE ORAL at 18:00

## 2018-05-16 RX ADMIN — SODIUM CHLORIDE 50 ML/HR: 9 INJECTION, SOLUTION INTRAVENOUS at 13:06

## 2018-05-16 RX ADMIN — TECHNETIUM TC 99M SULFUR COLLOID 1 DOSE: KIT at 07:38

## 2018-05-16 RX ADMIN — ESMOLOL HYDROCHLORIDE 30 MG: 10 INJECTION INTRAVENOUS at 07:22

## 2018-05-16 RX ADMIN — FAMOTIDINE 20 MG: 20 TABLET ORAL at 06:26

## 2018-05-16 NOTE — ANESTHESIA PROCEDURE NOTES
Peripheral Block    Patient location during procedure: OR  Reason for block: at surgeon's request and post-op pain management  Performed by  Anesthesiologist: CHRISTOPHER ADDISON  Preanesthetic Checklist  Completed: patient identified, site marked, surgical consent, pre-op evaluation, timeout performed, IV checked, risks and benefits discussed and monitors and equipment checked  Prep:  Pt Position: supine  Sterile barriers:cap, gloves, gown and mask  Prep: ChloraPrep  Patient monitoring: blood pressure monitoring, continuous pulse oximetry and EKG  Procedure  Sedation:no  Performed under: general  Guidance:ultrasound guided and landmark technique  Images:still images not obtained    Laterality:Bilateral  Block Type:PECS I and PECS II  Injection Technique:single-shot  Needle Type:short-bevel  Needle Gauge:20 G    Medications  Preservative Free Saline:10ml  Comment:Block Injection:  Total volume of LA divided between Right and Left sided blocks       Adjuncts:   Buprenorphine 0.30 mg ,Decadron 4 mg PSF  Local Injected:bupivacaine 0.25% Local Amount Injected:60mL  Post Assessment  Injection Assessment: negative aspiration for heme and incremental injection  Patient Tolerance:comfortable throughout block  Complications:no  Additional Notes  The pt. Was placed in the Supine Position and GA was induced     The insertion site was prepped with CHG and Ultrasound guidance with In-Plane techniquewas  a 4inch BBraun 360 degree echogenic needle was visualized.  Normal Saline PSF was  utilized for hydrodissection of tissue. PECS 1 Block- Pectoralis Major and Minor where identified and LA was injected between PMM and PmM at the level of the 3rd Rib(10ml),  PECS 2-  Pectoralis Minor and Serratus muscle where identified and the needle was advanced laterally in-plane with the 4th rib as a backstop, pleura was monitored.  LA was injected between SA and PmM at the level of 4th rib( 20ml).  LA injection spread was visualized, injection  was incremental 1-5ml, normal or low injection pressure, no intravascular injection, no pneumothorax appreciated.  Thank You.

## 2018-05-16 NOTE — OP NOTE
General Surgery Operative Note      BREAST MASTECTOMY WITH SENTINEL NODE BIOPSY  Procedure Report    Patient Name:  Tia Sommers  YOB: 1970  [id number]     Date of Surgery:  5/16/2018     Pre-op Diagnosis:   left breast cancer       Post-Op Diagnosis Codes:     * Breast cancer of lower-outer quadrant of left female breast [C50.512]    Procedure/CPT® Codes:      Procedure(s):  BREAST MASTECTOMY BILATERAL WITH LEFT SENTINEL NODE BIOPSY    Staff:  Surgeon(s):  Keven Dye MD    Assistant: DARRYL Samuel    Anesthesia: General    Estimated Blood Loss: 5 mL    Specimens:          ID Type Source Tests Collected by Time   A : RIGHT BREAST, MARKING STITCH IS LATERAL Tissue Breast, Right TISSUE PATHOLOGY EXAM Keven Dye MD 5/16/2018 0757   B : LEFT BREAST, LONG STITCH IS LATERAL, SHORT STITCH IS QUESTIONABLE LYMPH NODE Tissue Breast, Left TISSUE PATHOLOGY EXAM Keven Dye MD 5/16/2018 0837   C : LEFT AXILLARY SENTINEL NODE FOR FROZEN Tissue [place] Lymph Node TISSUE PATHOLOGY EXAM Keven Dye MD 5/16/2018 0925       Drains: Marino-Elizabeth drains ×4      Findings: Left breast cancer, frozen section to axillary nodes negative    Complications: None    Indications:  Patient with left breast cancer presents after complete preoperative evaluation and informed consent for the above-named procedure    Procedure: Patient was counseled in the preoperative and proceeded to's to surgery.  She will underwent general endotracheal anesthesia and pectoralis blocks bilaterally.  Technetium sulfur colloid was injected into intradermal portion of the left breast.    The chest breast area were prepped and draped in usual sterile fashion. Elliptical incisions were outlined symmetrically bilaterally oriented slightly off the horizontal toward the axilla.  The right side was addressed first.  The incisions were made along the premarked ellipse and skin flaps were created superiorly to the clavicle medially to  the sternum inferiorly below the inferior mammary ridge and laterally to latissimus.  The pectoralis major fascia was then elevated off the muscle itself and the breast tissue and pectoralis major fascia met all planes of dissection laterally.  Hemostasis was obtained with electrocautery and hemoclips.  The breast was marked for surgical pathology and the area irrigated with sterile water.  2 round, 5 mm, Marino-Elizabeth drains were placed through separate incisions inferiorly and secured with 2-0 silk sutures.  Then, after correct counts and complete hemostasis the incision was closed with subcutaneous 2-0 Vicryl and a subcuticular closure of 3-0 Monocryl.    The left side was then addressed.  The incision and skin flaps were made in the exact same manner as the opposite side.  Entrance was gained into the axilla for sentinel node biopsy.  2 sentinel lymph nodes were identified and were evaluated by frozen section and were negative on frozen section examination.  Was along the lateral aspect of the breast tissue and area suspicious for another lymph node and it was marked for pathology as well. Breast was then sent to surgical pathology after appropriate markings.  The areahemostasis was complete again with electrocautery and hemoclips.  Irrigation was complete and after correct counts incision was closed after 2 roundJackson-Elizabeth drains were placed on the left side the same as the right.  The incision was closed as the opposite side.  Dry dressings were applied estimated blood loss for the procedure less than 50 ML's complications were none.  Patient returned to recovery room in satisfactory stable condition      Keven Dye MD     Date: 5/16/2018  Time: 10:18 AM

## 2018-05-16 NOTE — INTERVAL H&P NOTE
[facility] Pre-op    Full history and physical note from office is up to date.  See office note attached.    /72 (BP Location: Left arm, Patient Position: Lying)   Pulse 70   Temp 97.8 °F (36.6 °C) (Temporal Artery )   Resp 16   SpO2 99%     IMM:  Influenza:  no  Pneumococcal:  no  Tetanus:  no    Cancer Staging (if applicable)  Cancer Patient: __ yes __no __unknown__N/A; If yes, clinical stage T:__ N:__M:__, stage group or __N/A (See Dr. Dye note)    Jennifer Edouard, APRN 5/16/2018 6:51 AM

## 2018-05-16 NOTE — ANESTHESIA PREPROCEDURE EVALUATION
Anesthesia Evaluation     Patient summary reviewed and Nursing notes reviewed                Airway   Mallampati: I  TM distance: >3 FB  Neck ROM: full  No difficulty expected  Dental      Pulmonary - negative pulmonary ROS   Cardiovascular - negative cardio ROS        Neuro/Psych- negative ROS  GI/Hepatic/Renal/Endo - negative ROS     Musculoskeletal (-) negative ROS    Abdominal    Substance History - negative use     OB/GYN negative ob/gyn ROS         Other          Other Comment: lupus                  Anesthesia Plan    ASA 2     general   (Pecs?)  intravenous induction   Anesthetic plan and risks discussed with patient.    Plan discussed with CRNA.

## 2018-05-16 NOTE — ANESTHESIA POSTPROCEDURE EVALUATION
Patient: Tia Sommers    Procedure Summary     Date:  05/16/18 [facility] / [facility]:   [facility]    Anesthesia Start:  0717 Anesthesia Stop:      Procedure:  BREAST MASTECTOMY BILATERAL WITH LEFT SENTINEL NODE BIOPSY (Bilateral Breast) Diagnosis:       Breast cancer of lower-outer quadrant of left female breast      (left breast cancer)    Surgeon:  Keven Dye MD Provider:  Braxton Wang MD    Anesthesia Type:  general ASA Status:  2          Anesthesia Type: general  Last vitals  BP   113/57   Temp   97.0   Pulse   63   Resp   12   SpO2   100%     Post Anesthesia Care and Evaluation    Patient location during evaluation: PACU  Patient participation: complete - patient participated  Level of consciousness: awake and alert  Pain score: 0  Pain management: adequate  Airway patency: patent  Anesthetic complications: No anesthetic complications  PONV Status: none  Cardiovascular status: hemodynamically stable and acceptable  Respiratory status: nonlabored ventilation, acceptable and nasal cannula  Hydration status: acceptable

## 2018-05-16 NOTE — ANESTHESIA PROCEDURE NOTES
Airway  Urgency: elective    Airway not difficult    General Information and Staff    Patient location during procedure: OR  Anesthesiologist: CHRISTOPHER ADDISON  CRNA: LUPE CADENA    Indications and Patient Condition  Indications for airway management: airway protection    Preoxygenated: yes  MILS not maintained throughout  Mask difficulty assessment: 1 - vent by mask    Final Airway Details  Final airway type: endotracheal airway      Successful airway: ETT  Cuffed: yes   Successful intubation technique: direct laryngoscopy  Endotracheal tube insertion site: oral  Blade: Atkinson  Blade size: #2  ETT size: 7.0 mm  Cormack-Lehane Classification: grade I - full view of glottis  Placement verified by: chest auscultation and capnometry   Cuff volume (mL): 5  Measured from: lips  ETT to lips (cm): 20  Number of attempts at approach: 1    Additional Comments  Negative epigastric sounds, Breath sound equal bilaterally with symmetric chest rise and fall. Atraumatic, dentition and mucosa unchanged

## 2018-05-16 NOTE — PLAN OF CARE
Problem: Patient Care Overview  Goal: Plan of Care Review  Outcome: Ongoing (interventions implemented as appropriate)   05/16/18 4129   Coping/Psychosocial   Plan of Care Reviewed With patient   Plan of Care Review   Progress improving   OTHER   Outcome Summary Doing well s/p bilateral mastectomy. VSS, tolerating liquids, pain controlled with medications. Will continue to monitor.      Goal: Individualization and Mutuality  Outcome: Ongoing (interventions implemented as appropriate)    Goal: Discharge Needs Assessment  Outcome: Ongoing (interventions implemented as appropriate)    Goal: Interprofessional Rounds/Family Conf  Outcome: Ongoing (interventions implemented as appropriate)      Problem: Breast Surgery/Reconstruction (Adult)  Goal: Signs and Symptoms of Listed Potential Problems Will be Absent, Minimized or Managed (Breast Surgery/Reconstruction)  Outcome: Ongoing (interventions implemented as appropriate)    Goal: Anesthesia/Sedation Recovery  Outcome: Outcome(s) achieved Date Met: 05/16/18

## 2018-05-16 NOTE — PROGRESS NOTES
"Daily Progress Note  Patient Name:  Tia Sommers  YOB: 1970  [id number]    Patient is a 48 y.o. female      Subjective: awake and alert, minimal pain      Vital Signs: /69   Pulse 56   Temp 97.4 °F (36.3 °C) (Oral)   Resp 18   Ht 167.6 cm (65.98\")   Wt 72.1 kg (158 lb 15.2 oz)   SpO2 100%   BMI 25.67 kg/m²     Physical Exam:    Lungs:     respirations unlabored     Incision:     Dressing intact, no evidence of bleeding or infection. Incisions clean and randy               Assessment/Plan: good post op course.  Discussed path findings.      Active Problems:     Active Problems:    Breast cancer, left            Keven Dye MD - 5/16/2018 .6:39 PM    "

## 2018-05-16 NOTE — PROGRESS NOTES
"Patient Name:  Tia Sommers  YOB: 1970  [id number]    Surgery Progress Note    Date of visit: 5/16/2018    Subjective   Subjective: Feeling Ok, pain controlled.         Objective     Objective:     /69   Pulse 56   Temp 97.4 °F (36.3 °C) (Oral)   Resp 18   Ht 167.6 cm (65.98\")   Wt 72.1 kg (158 lb 15.2 oz)   SpO2 100%   BMI 25.67 kg/m²     Intake/Output Summary (Last 24 hours) at 05/16/18 1723  Last data filed at 05/16/18 1230   Gross per 24 hour   Intake             1200 ml   Output              615 ml   Net              585 ml       CV:  Rhythm  regular and rate regular   L:  Clear  to auscultation bilaterally, Dressings c/d/i, RONIT serosanguinous  Abd:  Bowel sounds positive , soft,   Ext:  No cyanosis, clubbing, edema    Labs that are back at this time have been reviewed.        Assessment/Plan     Assessment/ Plan:    Hospital Problem List     Breast cancer, left - Doing well after B mastectomy. Continue Pulmonary toilet.              Roger Perez MD  5/16/2018  5:23 PM      "

## 2018-05-17 VITALS
TEMPERATURE: 98.4 F | HEART RATE: 73 BPM | HEIGHT: 66 IN | SYSTOLIC BLOOD PRESSURE: 131 MMHG | DIASTOLIC BLOOD PRESSURE: 76 MMHG | RESPIRATION RATE: 18 BRPM | BODY MASS INDEX: 25.55 KG/M2 | OXYGEN SATURATION: 99 % | WEIGHT: 158.95 LBS

## 2018-05-17 PROCEDURE — G0378 HOSPITAL OBSERVATION PER HR: HCPCS

## 2018-05-17 RX ADMIN — ACETAMINOPHEN 1000 MG: 500 TABLET, FILM COATED ORAL at 06:44

## 2018-05-17 RX ADMIN — CELECOXIB 200 MG: 200 CAPSULE ORAL at 06:44

## 2018-05-17 RX ADMIN — ACETAMINOPHEN 1000 MG: 500 TABLET, FILM COATED ORAL at 01:36

## 2018-05-17 NOTE — PLAN OF CARE
Problem: Patient Care Overview  Goal: Plan of Care Review  Outcome: Ongoing (interventions implemented as appropriate)   05/17/18 1026   Coping/Psychosocial   Plan of Care Reviewed With patient   Plan of Care Review   Progress improving   OTHER   Outcome Summary Patient being discharged, all instructions reviewed. Drain teaching done. VSS. Pain well controlled.      Goal: Individualization and Mutuality  Outcome: Ongoing (interventions implemented as appropriate)    Goal: Discharge Needs Assessment  Outcome: Ongoing (interventions implemented as appropriate)    Goal: Interprofessional Rounds/Family Conf  Outcome: Ongoing (interventions implemented as appropriate)      Problem: Breast Surgery/Reconstruction (Adult)  Goal: Signs and Symptoms of Listed Potential Problems Will be Absent, Minimized or Managed (Breast Surgery/Reconstruction)  Outcome: Ongoing (interventions implemented as appropriate)

## 2018-05-17 NOTE — PLAN OF CARE
Problem: Patient Care Overview  Goal: Plan of Care Review  Outcome: Ongoing (interventions implemented as appropriate)   05/17/18 0419   Coping/Psychosocial   Plan of Care Reviewed With patient   Plan of Care Review   Progress improving   OTHER   Outcome Summary VSS. Low UOP but adequate. Refusing narcs. Scheduled meds controling pain ok. Rested some. Anticipate DC today.        Problem: Breast Surgery/Reconstruction (Adult)  Goal: Signs and Symptoms of Listed Potential Problems Will be Absent, Minimized or Managed (Breast Surgery/Reconstruction)  Outcome: Ongoing (interventions implemented as appropriate)

## 2018-05-17 NOTE — PROGRESS NOTES
"Daily Progress Note  Patient Name:  Tia Sommers  YOB: 1970  [id number]    Patient is a 48 y.o. female      Subjective: Awake and alert. Mild pain.      Vital Signs: /70 (BP Location: Right leg, Patient Position: Lying)   Pulse 62   Temp 98 °F (36.7 °C) (Oral)   Resp 18   Ht 167.6 cm (65.98\")   Wt 72.1 kg (158 lb 15.2 oz)   SpO2 98%   BMI 25.67 kg/m²     Physical Exam:    Lungs:      respirations unlabored     Incision:     Dressing intact, no evidence of bleeding or infection. Incisions clean and dry.               Assessment/Plan: good post op course. Home today. Instructions given      Active Problems:     Active Problems:    Breast cancer, left            Keven Dye MD - 5/17/2018 .7:24 AM    "

## 2018-05-29 ENCOUNTER — DOCUMENTATION (OUTPATIENT)
Dept: GENETICS | Facility: HOSPITAL | Age: 48
End: 2018-05-29

## 2018-05-29 NOTE — PROGRESS NOTES
Tia Sommers is a 48-year old female who was seen for genetic counseling due to a personal history of breast cancer.  Ms. Sommers was recently diagnosed with an invasive lobular breast cancer of the left breast at age 48.   She was previously diagnosed with an invasive breast cancer of the right breast at age 29.  At that time, she underwent lumpectomy, radiation and chemotherapy.  Ms. Sommers retains her uterus and ovaries and is nulliparous.  She has not had a colonoscopy.  Ms. Sommers was interested in discussing her risk for a hereditary cancer syndrome, and decided to pursue genetic testing.   Ms. Sommers opted to pursue comprehensive testing via the BreastNext panel ordered through [facility] which includes 17 genes that are known to increase the risk of breast cancer (ALEXSANDRA, BARD1, BRCA1, BRCA2, BRIP1, CDH1, CHEK2, NBN, NF1, MRE11A, MUTYH, PALB2, PTEN, RAD50, RAD51C, RAD51D, TP53). Ms. 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 These positive results were discussed with Ms. Sommers by telephone on 5/29/18, and she is scheduled for a follow up visit on 6/4/18 to further discuss these results.      FAMILY HISTORY:  Pat. 1st cousin:   Unknown primary, 40s  Pat. 1st cousin, once-removed: Breast cancer, 40s    RISK ASSESSMENT:  Ms. Sommers’s personal history of early onset breast cancer raises the question of a hereditary cancer syndrome.  We discussed BRCA1/2 testing as well as the option of pursuing a panel that would test for other genes known to impact breast cancer risk in addition to BRCA1/2.   Ms. Sommers clearly meets NCCN guidelines criteria for BRCA1/2 testing based on her age at diagnosis.  This risk assessment is based on the family history information provided at the time of the appointment.  The assessment could change in the future should new information be obtained.    GENETIC COUNSELING: We reviewed the family history information in detail.  Cases of breast  cancer follow three general patterns: sporadic, familial, and hereditary.  While most cancer is sporadic, some cases appear to occur in family clusters.  These cases are said to be familial and account for 10-20% of breast cancer cases.  Familial cases may be due to a combination of shared genes and environmental factors among family members.  In even fewer families, the cancer is said to be inherited, and the genes responsible for the cancer are known.      Family histories typical of hereditary cancer syndromes usually include multiple first- and second-degree relatives diagnosed with cancer types that define a syndrome.  These cases tend to be diagnosed at younger-than-expected ages and can be bilateral or multifocal.  The cancer in these families follows an autosomal dominant inheritance pattern, which indicates the likely presence of a mutation in a cancer susceptibility gene.  Children and siblings of an individual believed to carry this mutation have a 50% chance of inheriting that mutation, thereby inheriting the increased risk to develop cancer.  These mutations can be passed down from the maternal or the paternal lineage.    Hereditary breast cancer accounts for 5-10% of all cases of breast cancer.  A significant proportion of hereditary breast cancer can be attributed to mutations in the BRCA1 and BRCA2 genes.  Mutations in these genes confer an increased risk for breast cancer, ovarian cancer, male breast cancer, prostate cancer and pancreatic cancer.  Women with a BRCA1 or BRCA2 mutation who have already been diagnosed with breast cancer have a 40-60% lifetime risk of a second breast cancer.     There are other genes that are known to be associated with an increased risk for breast cancer, including CHEK2.  Some of these genes have well defined cancer risks and established management guidelines.  Other genes that can be tested for have been more recently described, and there may be less data regarding  the risks and therefore may not have established management guidelines.  Based on Ms. Sommers’s desire to get as much information as possible regarding her personal risks and potential risks for her family, she opted to pursue testing through a panel that would look at several other genes known to increase the risk for breast cancer.    GENETIC TESTING:  The risks, benefits and limitations of genetic testing and implications for clinical management following testing were reviewed.  DNA test results can influence decisions regarding screening, prevention and surgical management.  Genetic testing can have significant psychological implications for both individuals and families.  Also discussed was the possibility of employment and insurance discrimination based on genetic test results and the laws in place to prevent this.    We discussed panel testing, which would involve testing for BRCA1/2 as well as several other genes at the same time (other genes include ALEXSANDRA, BARD1, BRIP1, CDH1, CHEK2, NBN, NF1, MRE11A, MUTYH, PALB2, PTEN, RAD50, RAD51C, RAD51D, TP53). The benefits and limitations of genetic testing were discussed and Ms. Sommers decided to pursue testing via the panel. The implications of a positive or negative test result were discussed. We discussed the possibility that, in some cases, genetic test results may be informative or may be ambiguous due to the identification of a genetic variant. These variants may or may not be associated with an increased cancer risk.  Given her personal history, a negative test result would not eliminate all breast cancer risk to her relatives, although the risk would not be as high as it would with positive genetic testing.      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 This gene is considered a moderate risk breast cancer gene, and is consistent with a hereditary risk for breast cancer. Genetic  testing for the CHEK2 gene is indicated for other family members to determine whether they are at an increased risk for breast and colon cancer. Ms. Sommers’s sister has a 50% chance of having inherited this mutation.  Relatives would need a copy of Ms. Sommers’s result to ensure they were being tested for the correct mutation.      Until each at-risk family member has been proven not to carry this CHEK2 mutation, they could be offered increased surveillance.  We would be happy to see family members who live in the [facility] or in [facility] in our clinic to further discuss this information and testing options. They can call our office at [phone] to schedule an appointment. For family members who live elsewhere, there are genetic counselors at most [facility] centers. They can find a genetic counselor by visiting the National Society of Genetic Counselors website at www.nsgc.org or they can call our office and we would be happy to give them the contact information of the closest genetic counselor.      CANCER RISK: Mutations in CHEK2 have been estimated to increase the risk of female breast cancer to somewhere between 23-48%, dependent on the extent of the family history.   Data from one study showed that for women who have been diagnosed with breast cancer, there is up to a 29% risk of a second breast cancer primary within 10 years of the first diagnosis.     There may be a somewhat increased risk of colorectal cancer for men and women with a CHEK2 mutation. Men have been shown to have an increased lifetime risk to develop prostate cancer. Some studies have described a possible increased risk for a wide range of cancers in patients with CHEK2 mutations, however these studies are not conclusive and there are no medical management guidelines to address these possible risks at this time. We encourage Ms. Sommers to recontact genetics periodically to determine whether there have been additional  findings or updated screening guidelines.    CANCER SCREENING:  Options available to individuals with a CHEK2 mutation and at high risk for breast cancer were discussed, including increased surveillance, chemoprevention, and preventative surgery.    For women with a CHEK2 mutation who have not had a breast cancer diagnosis, increased breast cancer surveillance and chemoprevention are warranted.  Increased surveillance, based on NCCN guidelines, would consist of semi-annual clinical breast exam and monthly self-breast exam starting at age 18 and annual mammography and breast MRI starting at age 40, or earlier based on family history. Based on Ms. Sommers’s age at her first diagnosis, relatives who test positive for CHEK2 should start annual breast MRI at age 25, and mammogram alternating with breast MRI every 6 months starting at age 30.   Breast cancer chemoprevention is another option that relatives may wish to consider in the future.  Studies have shown that Tamoxifen and Raloxifene can cut the risk of estrogen receptor positive breast cancer by 50% when taken by high-risk women. There are risks associated with these medications; therefore, the risks versus benefits must be considered prior to deciding to take chemopreventative medications.  For women who have not had a breast cancer diagnosis, the NCCN guidelines state that there is not currently evidence to make a recommendation for prophylactic risk reducing mastectomy for CHEK2 mutation carriers.  However, depending on family history, preventative bilateral mastectomy is still considered by some women that test positive for a CHEK2 mutation, and this reduces breast cancer risk by approximately 90%.    Current NCCN screening guidelines recommend that individuals with a CHEK2 mutation begin colonoscopies at 40 years of age and repeat every 5 years.  Ms. Sommers has not yet had a colonoscopy.    PLAN:   Genetic counseling remains available to Ms. Sommers and her  family. Ms. Sommers is scheduled for a follow up appointment on 6/4/18 to further discuss these results.  Ms. Sommers is welcome to contact us at [phone] with any questions she may have.      Nuvia Guzman MS, [facility] [place], [facility]  Licensed Certified Genetic Counselor    Cc: MD Chad Vinson MD

## 2018-06-04 ENCOUNTER — CONSULT (OUTPATIENT)
Dept: ONCOLOGY | Facility: CLINIC | Age: 48
End: 2018-06-04

## 2018-06-04 ENCOUNTER — LAB (OUTPATIENT)
Dept: LAB | Facility: HOSPITAL | Age: 48
End: 2018-06-04

## 2018-06-04 ENCOUNTER — CLINICAL SUPPORT (OUTPATIENT)
Dept: GENETICS | Facility: HOSPITAL | Age: 48
End: 2018-06-04
Attending: SURGERY

## 2018-06-04 VITALS
TEMPERATURE: 97.7 F | DIASTOLIC BLOOD PRESSURE: 87 MMHG | WEIGHT: 161 LBS | HEIGHT: 66 IN | RESPIRATION RATE: 19 BRPM | HEART RATE: 69 BPM | BODY MASS INDEX: 25.88 KG/M2 | SYSTOLIC BLOOD PRESSURE: 169 MMHG

## 2018-06-04 DIAGNOSIS — C50.912 MALIGNANT NEOPLASM OF LEFT BREAST IN FEMALE, ESTROGEN RECEPTOR POSITIVE, UNSPECIFIED SITE OF BREAST (HCC): ICD-10-CM

## 2018-06-04 DIAGNOSIS — Z15.02 CHEK2-RELATED BREAST CANCER (HCC): Primary | ICD-10-CM

## 2018-06-04 DIAGNOSIS — C50.919 CHEK2-RELATED BREAST CANCER (HCC): Primary | ICD-10-CM

## 2018-06-04 DIAGNOSIS — Z17.0 MALIGNANT NEOPLASM OF LEFT BREAST IN FEMALE, ESTROGEN RECEPTOR POSITIVE, UNSPECIFIED SITE OF BREAST (HCC): ICD-10-CM

## 2018-06-04 DIAGNOSIS — Z15.09 CHEK2-RELATED BREAST CANCER (HCC): Primary | ICD-10-CM

## 2018-06-04 DIAGNOSIS — C50.912 MALIGNANT NEOPLASM OF LEFT BREAST IN FEMALE, ESTROGEN RECEPTOR POSITIVE, UNSPECIFIED SITE OF BREAST (HCC): Primary | ICD-10-CM

## 2018-06-04 DIAGNOSIS — Z17.0 MALIGNANT NEOPLASM OF LEFT BREAST IN FEMALE, ESTROGEN RECEPTOR POSITIVE, UNSPECIFIED SITE OF BREAST (HCC): Primary | ICD-10-CM

## 2018-06-04 DIAGNOSIS — Z15.89 CHEK2-RELATED BREAST CANCER (HCC): Primary | ICD-10-CM

## 2018-06-04 LAB
ALBUMIN SERPL-MCNC: 4 G/DL (ref 3.2–4.8)
ALBUMIN/GLOB SERPL: 1.3 G/DL (ref 1.5–2.5)
ALP SERPL-CCNC: 112 U/L (ref 25–100)
ALT SERPL W P-5'-P-CCNC: 54 U/L (ref 7–40)
ANION GAP SERPL CALCULATED.3IONS-SCNC: 7 MMOL/L (ref 3–11)
AST SERPL-CCNC: 25 U/L (ref 0–33)
BILIRUB SERPL-MCNC: 0.8 MG/DL (ref 0.3–1.2)
BUN BLD-MCNC: 12 MG/DL (ref 9–23)
BUN/CREAT SERPL: 15 (ref 7–25)
CALCIUM SPEC-SCNC: 8.9 MG/DL (ref 8.7–10.4)
CHLORIDE SERPL-SCNC: 106 MMOL/L (ref 99–109)
CO2 SERPL-SCNC: 27 MMOL/L (ref 20–31)
CREAT BLD-MCNC: 0.8 MG/DL (ref 0.6–1.3)
ERYTHROCYTE [DISTWIDTH] IN BLOOD BY AUTOMATED COUNT: 15.3 % (ref 11.3–14.5)
GFR SERPL CREATININE-BSD FRML MDRD: 76 ML/MIN/1.73
GFR SERPL CREATININE-BSD FRML MDRD: 92 ML/MIN/1.73
GLOBULIN UR ELPH-MCNC: 3.2 GM/DL
GLUCOSE BLD-MCNC: 80 MG/DL (ref 70–100)
HCT VFR BLD AUTO: 39.6 % (ref 34.5–44)
HGB BLD-MCNC: 12.3 G/DL (ref 11.5–15.5)
LYMPHOCYTES # BLD AUTO: 3 10*3/MM3 (ref 0.6–4.8)
LYMPHOCYTES NFR BLD AUTO: 53.7 % (ref 24–44)
MCH RBC QN AUTO: 27.1 PG (ref 27–31)
MCHC RBC AUTO-ENTMCNC: 31.1 G/DL (ref 32–36)
MCV RBC AUTO: 87.2 FL (ref 80–99)
MONOCYTES # BLD AUTO: 0.2 10*3/MM3 (ref 0–1)
MONOCYTES NFR BLD AUTO: 4.4 % (ref 0–12)
NEUTROPHILS # BLD AUTO: 2.3 10*3/MM3 (ref 1.5–8.3)
NEUTROPHILS NFR BLD AUTO: 41.9 % (ref 41–71)
PLATELET # BLD AUTO: 304 10*3/MM3 (ref 150–450)
PMV BLD AUTO: 7.7 FL (ref 6–12)
POTASSIUM BLD-SCNC: 4.5 MMOL/L (ref 3.5–5.5)
PROT SERPL-MCNC: 7.2 G/DL (ref 5.7–8.2)
RBC # BLD AUTO: 4.54 10*6/MM3 (ref 3.89–5.14)
SODIUM BLD-SCNC: 140 MMOL/L (ref 132–146)
WBC NRBC COR # BLD: 5.5 10*3/MM3 (ref 3.5–10.8)

## 2018-06-04 PROCEDURE — 85025 COMPLETE CBC W/AUTO DIFF WBC: CPT

## 2018-06-04 PROCEDURE — 36415 COLL VENOUS BLD VENIPUNCTURE: CPT

## 2018-06-04 PROCEDURE — 99245 OFF/OP CONSLTJ NEW/EST HI 55: CPT | Performed by: INTERNAL MEDICINE

## 2018-06-04 PROCEDURE — 80053 COMPREHEN METABOLIC PANEL: CPT

## 2018-06-04 RX ORDER — HYDROCODONE BITARTRATE AND ACETAMINOPHEN 7.5; 325 MG/1; MG/1
TABLET ORAL
COMMUNITY
Start: 2018-05-17 | End: 2018-12-18 | Stop reason: SDUPTHER

## 2018-06-04 NOTE — PROGRESS NOTES
CHIEF COMPLAINT: Management of breast cancer    REASON FOR REFERRAL: Management of breast cancer      RECORDS OBTAINED  Records of the patients history including those obtained from  Ronen Dye were reviewed and summarized in detail.       Breast cancer, left    5/16/2018 Initial Diagnosis     Stage IB [id number] Breast cancer, left: Had bilateral mastectomy on 5/16/18 with benign disease on the right.  The left breast had infiltrating lobular carcinoma, Bloom Gutierrez 6 out of 9, 2 foci largest being 4 cm, 4 total nodes and 2 sentinel nodes taken all negative.  There was a positive superficial margin.  Biopsy on 3/28/18 of abnormality found on screening mammogram was 95% 3+ positive ER and 95% 3+ positive NJ and HER-2/taiwo 0+.  Baseline liver enzymes abnormal ALT 79 AST 55.         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  CHEK2 also causes an increased risk for colon cancer (up to 9.5% lifetime risk).              HISTORY OF PRESENT ILLNESS:  The patient is a 48 y.o.  female, referred for Management of adjuvant therapy breast cancer.  She was gene positive and has had bilateral mastectomies.  Healing well.  Did have a positive margin but this was in the subcutaneous margin. Hx of Breast Ca '98 treated with chemo one of which was red as well as RT.      REVIEW OF SYSTEMS:  A 14 point review of systems was performed and is negative except as noted above.    Past Medical History:   Diagnosis Date   • Arthritis     minna knees and hands   • Breast cancer 2000    dx in 1998;  2018   • Drug therapy 2000   • Hx of radiation therapy 2000   • Lupus 2014   • Lymphedema     right arm     Past Surgical History:   Procedure Laterality Date   • BONE BIOPSY      back   • BREAST BIOPSY Right    • BREAST LUMPECTOMY Right 2000   • COLONOSCOPY     • DIAGNOSTIC LAPAROSCOPY EXPLORATORY LAPAROTOMY     • ENDOSCOPY     • MASTECTOMY  "W/ SENTINEL NODE BIOPSY Bilateral 5/16/2018    Procedure: BREAST MASTECTOMY BILATERAL WITH LEFT SENTINEL NODE BIOPSY;  Surgeon: Keven Dye MD;  Location: [facility];  Service: General   • SPLENECTOMY     • VENOUS ACCESS DEVICE (PORT) INSERTION     • VENOUS ACCESS DEVICE (PORT) REMOVAL  2003       Current Outpatient Prescriptions on File Prior to Visit   Medication Sig Dispense Refill   • naproxen sodium (ALEVE) 220 MG tablet Take 440 mg by mouth 2 (Two) Times a Day As Needed.       No current facility-administered medications on file prior to visit.        Allergies   Allergen Reactions   • Penicillins Anaphylaxis   • Iodine Rash   • Latex Rash       Social History     Social History   • Marital status:      Social History Main Topics   • Smoking status: Current Every Day Smoker     Packs/day: 0.25     Types: Cigarettes     Start date: 5/15/1988   • Smokeless tobacco: Never Used      Comment: one cigarette per day   • Alcohol use 0.6 oz/week     1 Glasses of wine per week      Comment: social   • Drug use: No     Other Topics Concern   • Not on file       Family History   Problem Relation Age of Onset   • Breast cancer Neg Hx    • Endometrial cancer Neg Hx    • Ovarian cancer Neg Hx        PHYSICAL EXAM:    /87   Pulse 69   Temp 97.7 °F (36.5 °C) (Temporal Artery )   Resp 19   Ht 167.6 cm (65.98\")   Wt 73 kg (161 lb)   BMI 26.00 kg/m²     ECOG: (1) Restricted in physically strenuous activity, ambulatory and able to do work of light nature  General: well appearing female in no acute distress  HEENT: sclera anicteric, oropharynx clear  Lymphatics: no cervical, supraclavicular, inguinal, or axillary adenopathy  Cardiovascular: regular rate and rhythm, no murmurs  Neck: Supple; No thyromegaly  Lungs: clear to auscultation bilaterally. No respiratory distress.   Abdomen: soft, nontender, nondistended.  No palpable organomegaly  Extremities: no cyanosis, clubbing, edema, or cords  Skin: no rashes, " lesions, bruising, or petechiae  Neuro: Alert and oriented x 4; Moving all extremities.  Psych: No anxiety or depression  Bilateral mastectomy scars healing well.  Admission on 05/16/2018, Discharged on 05/17/2018   Component Date Value Ref Range Status   • HCG, Urine, QL 05/16/2018 Negative  Negative Final   • Lot Number 05/16/2018 [id number]   Final   • Internal Positive Control 05/16/2018 Positive   Final   • Internal Negative Control 05/16/2018 Negative   Final   • Case Report 05/16/2018    Final                    Value:Surgical Pathology Report                         Case: [id number]                                  Authorizing Provider:  Keven Dye MD         Collected:           05/16/2018 07:57 AM          Ordering Location:     [facility]   Received:            05/16/2018 08:22 AM                                 OR                                                                           Pathologist:           Obie Flood MD                                                         Intraop:               Destin Johnston MD                                                            Specimens:   1) - Breast, Right, RIGHT BREAST, MARKING STITCH IS LATERAL                                         2) - Breast, Left, LEFT BREAST, LONG STITCH IS LATERAL, SHORT STITCH IS QUESTIONABLE                LYMPH NODE                                                                                          3) - [place] Lymph Node, LEFT AXILLARY SENTINEL NODE FOR FROZEN                          • Clinical Information 05/16/2018    Final                    Value:This result contains rich text formatting which cannot be displayed here.   • Final Diagnosis 05/16/2018    Final                    Value:This result contains rich text formatting which cannot be displayed here.   • Comment 05/16/2018    Final                    Value:This result contains rich text formatting which cannot be displayed here.   •  Intraoperative Consultation 05/16/2018    Final                    Value:This result contains rich text formatting which cannot be displayed here.   • Gross Description 05/16/2018    Final                    Value:This result contains rich text formatting which cannot be displayed here.   • Microscopic Description 05/16/2018    Final                    Value:This result contains rich text formatting which cannot be displayed here.   Appointment on 05/15/2018   Component Date Value Ref Range Status   • WBC 05/15/2018 4.02  3.50 - 10.80 10*3/mm3 Final   • RBC 05/15/2018 4.46  3.89 - 5.14 10*6/mm3 Final   • Hemoglobin 05/15/2018 12.4  11.5 - 15.5 g/dL Final   • Hematocrit 05/15/2018 37.8  34.5 - 44.0 % Final   • MCV 05/15/2018 84.8  80.0 - 99.0 fL Final   • MCH 05/15/2018 27.8  27.0 - 31.0 pg Final   • MCHC 05/15/2018 32.8  32.0 - 36.0 g/dL Final   • RDW 05/15/2018 14.9* 11.3 - 14.5 % Final   • RDW-SD 05/15/2018 46.3  37.0 - 54.0 fl Final   • MPV 05/15/2018 9.4  6.0 - 12.0 fL Final   • Platelets 05/15/2018 253  150 - 450 10*3/mm3 Final   • Glucose 05/15/2018 101* 70 - 100 mg/dL Final   • BUN 05/15/2018 15  9 - 23 mg/dL Final   • Creatinine 05/15/2018 0.80  0.60 - 1.30 mg/dL Final   • Sodium 05/15/2018 142  132 - 146 mmol/L Final   • Potassium 05/15/2018 3.7  3.5 - 5.5 mmol/L Final   • Chloride 05/15/2018 103  99 - 109 mmol/L Final   • CO2 05/15/2018 27.0  20.0 - 31.0 mmol/L Final   • Calcium 05/15/2018 8.5* 8.7 - 10.4 mg/dL Final   • Total Protein 05/15/2018 7.0  5.7 - 8.2 g/dL Final   • Albumin 05/15/2018 4.00  3.20 - 4.80 g/dL Final   • ALT (SGPT) 05/15/2018 79* 7 - 40 U/L Final   • AST (SGOT) 05/15/2018 55* 0 - 33 U/L Final   • Alkaline Phosphatase 05/15/2018 72  25 - 100 U/L Final   • Total Bilirubin 05/15/2018 1.1  0.3 - 1.2 mg/dL Final   • eGFR Non African Amer 05/15/2018 76  >60 mL/min/1.73 Final   • eGFR   Amer 05/15/2018 92  >60 mL/min/1.73 Final   • Globulin 05/15/2018 3.0  gm/dL Final   • A/G Ratio  05/15/2018 1.3* 1.5 - 2.5 g/dL Final   • BUN/Creatinine Ratio 05/15/2018 18.8  7.0 - 25.0 Final   • Anion Gap 05/15/2018 12.0* 3.0 - 11.0 mmol/L Final   • Hemoglobin A1C 05/15/2018 5.20  4.80 - 5.60 % Final       Assessment/Plan     1. Stage IB hormone receptor strongly positive HER-2/taiwo negative breast cancer  2. 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  Independent of that, she is already had bilateral mastectomies.  Though the anterior margin was positive this was subcutaneous and I assume nothing further need to be done regarding wider excision but I got a call out to Dr. Dye to discuss that.  I will get her CAT scan of the abdomen and pelvis in light of her liver enzyme elevation but she has IV dye allergy and therefore we will do this without contrast which does diminish the sensitivity.  We will send Oncotype to determine whether to proceed with adjuvant chemotherapy or not.  The only question I have relative to the positive margin would be whether we need adjuvant radiation in light of that even though this was a subcutaneous margin.  I discussed with Dr. Dye.  Sees RT in 2 days.  Dr. Dye may do colonoscopy.  Asked her to talk with him as she sees him next week.  Will talk re TMX (still cycling) vs Lupron Armidex ORTC. With prior asa likely, doubt will give aggressive chemo regardless of oncotype. Hopefully won't need it.  Chronic right UE lymphedema since RT/surgery in '98 for prior breast ca. D/W D/W pt > 1 hour >50% counseling.    Chad Valle MD    6/4/2018

## 2018-06-05 NOTE — PROGRESS NOTES
Patient was seen for follow-up to discuss CHEK2+ genetic testing.   See note from 5/29/18 for full summary regarding CHEK2+ status.  Patient was given copy of results and summary letter.  Discussed testing for sister, which would be free through [facility] if done within 90 days.  Reviewed result with patient and answered all questions.  Patient was encouraged to call with any additional questions or concerns.

## 2018-06-07 ENCOUNTER — OFFICE VISIT (OUTPATIENT)
Dept: RADIATION ONCOLOGY | Facility: HOSPITAL | Age: 48
End: 2018-06-07

## 2018-06-07 ENCOUNTER — HOSPITAL ENCOUNTER (OUTPATIENT)
Dept: CT IMAGING | Facility: HOSPITAL | Age: 48
Discharge: HOME | End: 2018-06-07
Attending: INTERNAL MEDICINE | Admitting: INTERNAL MEDICINE

## 2018-06-07 ENCOUNTER — HOSPITAL ENCOUNTER (OUTPATIENT)
Dept: RADIATION ONCOLOGY | Facility: HOSPITAL | Age: 48
Setting detail: RADIATION/ONCOLOGY SERIES
Discharge: HOME | End: 2018-06-07

## 2018-06-07 VITALS
DIASTOLIC BLOOD PRESSURE: 75 MMHG | BODY MASS INDEX: 25.81 KG/M2 | HEART RATE: 81 BPM | SYSTOLIC BLOOD PRESSURE: 161 MMHG | TEMPERATURE: 97.5 F | WEIGHT: 159.8 LBS | OXYGEN SATURATION: 96 % | RESPIRATION RATE: 18 BRPM

## 2018-06-07 DIAGNOSIS — C50.912 MALIGNANT NEOPLASM OF LEFT BREAST IN FEMALE, ESTROGEN RECEPTOR POSITIVE, UNSPECIFIED SITE OF BREAST (HCC): ICD-10-CM

## 2018-06-07 DIAGNOSIS — Z17.0 MALIGNANT NEOPLASM OF LEFT BREAST IN FEMALE, ESTROGEN RECEPTOR POSITIVE, UNSPECIFIED SITE OF BREAST (HCC): ICD-10-CM

## 2018-06-07 PROCEDURE — 74176 CT ABD & PELVIS W/O CONTRAST: CPT

## 2018-06-07 PROCEDURE — G0463 HOSPITAL OUTPT CLINIC VISIT: HCPCS | Performed by: RADIOLOGY

## 2018-06-07 RX ORDER — METRONIDAZOLE 500 MG/1
TABLET ORAL
COMMUNITY
Start: 2018-06-05 | End: 2018-09-14

## 2018-06-07 NOTE — PROGRESS NOTES
CONSULTATION NOTE      :                                                          1970  DATE OF CONSULTATION:                       2018   REQUESTING PHYSICIAN:                   Keven Dye MD  REASON FOR CONSULTATION:            Cancer Staging  Breast cancer, left  Staging form: Breast, AJCC 8th Edition  - Clinical: Stage IB (cT2, cN0, cM0, G2, ER: Positive, NC: Positive, HER2: Negative) - Signed by Chad Valle MD on 2018    Thank you for requesting my services in evaluation of this pleasant individual.  I am seeing them in outpatient consultation regarding a diagnosis of left breast lobular carcinoma.       BRIEF HISTORY:  The patient is a very pleasant 48 y.o. female  with a past medical history significant for lupus and a right sided primary breast cancer diagnosed in the year , which the patient has relatively minimal recollection, but she had a primary tumor in the right breast with involved lymph nodes, treated by breast conservation therapy with a lymph node dissection followed by adjuvant radiation therapy and chemotherapy.  She has remained apparently without evidence of disease since that time, but has unfortunately been dealing with right upper extremity lymphedema.  She states she originally was seen by physical therapy in the year , but has not been seen since.  All of her previous oncologic care was completed outside of [place], [place].  She more recently presented after finding a palpable mass in the left breast and a mammogram was performed identifying an abnormality.  She underwent a biopsy identifying invasive lobular carcinoma.  Tumor was strongly hormone receptor positive measuring 95% estrogen, 95% progesterone, and negative for HER-2.  She has since underwent a bilateral mastectomies without reconstruction.  Final pathology identified 2 separate masses in the left breast the largest of which measuring 4 cm and the second nodule measuring 0.6 cm.  The tumor was  moderately differentiated, with a Ohara Gutierrez score of 6 out of 9.  Overall the margins are negative however this commented that the superficial margin is positive at the inferior aspect of the specimen.  There is no DCIS identified and out of a total of 4 lymph nodes removed none of which had any evidence of malignancy.  She has since been evaluated by Dr. Valle with medical oncology.  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  She has not completed colorectal screening before, and she is currently in the process of being scheduled to undergo a screening colonoscopy.  She has also had discussions regarding systemic therapy and given the likelihood of her previous treatment including an anthracycline, it is anticipated that she may only be treated with hormonal therapy alone at this point in time.  I am seeing her for consideration of adjuvant radiation therapy to the left chest wall.  From a symptomatic standpoint, she states she is doing rather well.  She has some pain in the left chest and left upper extremity which has been present since the time of surgery and she thinks this is improving she continues to complain of right upper extremity lymphedema and states that this is currently worse than her normal.  She has been performing regular arm exercises without much relief.  She does not have a lymphedema sleeve.  She otherwise denies any other constitutional symptoms.    Allergies   Allergen Reactions   • Iodine Anaphylaxis   • Penicillins Anaphylaxis   • Latex Rash       Social History     Social History   • Marital status:      Social History Main Topics   • Smoking status: Current Every Day Smoker     Packs/day: 0.25     Types: Cigarettes     Start date: 5/15/1988   • Smokeless tobacco: Never Used       Comment: one cigarette per day   • Alcohol use 0.6 oz/week     1 Glasses of wine per week      Comment: social   • Drug use: No   • Sexual activity: Defer     Other Topics Concern   • Not on file       Past Medical History:   Diagnosis Date   • Arthritis     minna knees and hands   • Breast cancer 2000    dx in 1998;  2018   • Drug therapy 2000   • Hx of radiation therapy 2000   • Lupus 2014   • Lymphedema     right arm       family history is not on file.     Past Surgical History:   Procedure Laterality Date   • BONE BIOPSY      back   • BREAST BIOPSY Right    • BREAST LUMPECTOMY Right 2000   • COLONOSCOPY      > 10 years   • DIAGNOSTIC LAPAROSCOPY EXPLORATORY LAPAROTOMY     • ENDOSCOPY     • MASTECTOMY W/ SENTINEL NODE BIOPSY Bilateral 5/16/2018    Procedure: BREAST MASTECTOMY BILATERAL WITH LEFT SENTINEL NODE BIOPSY;  Surgeon: Keven Dye MD;  Location: [facility];  Service: General   • SPLENECTOMY     • VENOUS ACCESS DEVICE (PORT) INSERTION     • VENOUS ACCESS DEVICE (PORT) REMOVAL  2003        Review of Systems   Constitutional: Positive for fatigue.   Cardiovascular: Positive for leg swelling (reports bilateral ankles).        Right arm lymphadema   Musculoskeletal: Positive for back pain (reports -chronic).   Skin:        Healing mastectomy incisions   Hematological: Bruises/bleeds easily.   Psychiatric/Behavioral: The patient is nervous/anxious.    All other systems reviewed and are negative.          Objective   VITAL SIGNS:   Vitals:    06/07/18 1034   BP: 161/75   Pulse: 81   Resp: 18   Temp: 97.5 °F (36.4 °C)   TempSrc: Temporal Artery    SpO2: 96%   Weight: 72.5 kg (159 lb 12.8 oz)   PainSc: 6  Comment: perscribed pain meds-  per pt   PainLoc: Chest  Comment: surgical incision bilateral mastectomy        No Data Recorded      Physical Exam   Constitutional: She is oriented to person, place, and time. She appears well-developed and well-nourished. No distress.   HENT:   Head: Normocephalic and  atraumatic.   Mouth/Throat: Oropharynx is clear and moist.   Eyes: Conjunctivae and EOM are normal. Pupils are equal, round, and reactive to light.   Neck: Normal range of motion. Neck supple.   Cardiovascular: Normal rate and regular rhythm.  Exam reveals no friction rub.    No murmur heard.  Pulmonary/Chest: Effort normal and breath sounds normal. She has no wheezes. She exhibits swelling. She exhibits no mass.   Patient is status post bilateral mastectomy, with incisions that are healing well.  There is no abnormal erythema or masses.  She has slight swelling laterally to the incisions bilaterally.   Abdominal: Soft. Bowel sounds are normal. She exhibits no distension and no mass. There is no tenderness.   Musculoskeletal: Normal range of motion. She exhibits no edema.   She has rather pronounced limitations 2 mobility in both the right and left shoulders.  She is able to abduct and externally rotate her right shoulder to approximately 90°, limited secondary to lymphedema.  Her left shoulder she has actually more restriction and can only get to about 60 degrees.   Lymphadenopathy:     She has no cervical adenopathy.   RUE lymphedema to the upper arm; she has slight swelling in the LUE, most apparent in the upper arm   Neurological: She is alert and oriented to person, place, and time.   Skin: Skin is warm and dry.   Expansion and swelling of both the proximal and distal interphalangeal joints on her bilateral hands.  She also has scalloping of the finger nail beds.   Psychiatric: She has a normal mood and affect. Her behavior is normal. Judgment and thought content normal.   Nursing note and vitals reviewed.    IMAGING  I have personally reviewed her relevant imaging studies, as follows:  Us Breast Left Limited    Result Date: 3/28/2018  Findings highly suggestive of malignancy at the left 4:00 and 3:00/3:30 positions.   BI-RADS CATEGORY:  5, HIGHLY SUGGESTIVE OF MALIGNANCY   RECOMMENDATION:  Ultrasound-guided  biopsy of both the 3:00 and 4:00 masses.  CAD was utilized.  The standard false-negative rate of mammography is between 10% and 25%. Complex patterns or increased breast density will markedly elevate the false-negative rate of mammography.    A letter, in lay terminology, with the results of this exam was given to the patient at the time of the visit. __________________________________________________________ Physician Order  Ultrasound Guided Breast Biopsy  Diagnosis: Abnormal Mammogram  This report was finalized on 3/28/2018 1:10 PM by Dr. Monalisa Méndez MD.      Mammo Diagnostic Digital Tomosynthesis Bilateral With Cad    Result Date: 3/28/2018  Findings highly suggestive of malignancy at the left 4:00 and 3:00/3:30 positions.   BI-RADS CATEGORY:  5, HIGHLY SUGGESTIVE OF MALIGNANCY   RECOMMENDATION:  Ultrasound-guided biopsy of both the 3:00 and 4:00 masses.  CAD was utilized.  The standard false-negative rate of mammography is between 10% and 25%. Complex patterns or increased breast density will markedly elevate the false-negative rate of mammography.    A letter, in lay terminology, with the results of this exam was given to the patient at the time of the visit. __________________________________________________________ Physician Order  Ultrasound Guided Breast Biopsy  Diagnosis: Abnormal Mammogram  This report was finalized on 3/28/2018 1:10 PM by Dr. Monalisa Méndez MD.      PATHOLOGY  I've personally reviewed her pathology report dated 5/16/2018 right breast simple mastectomy shows no invasive or in situ carcinoma.  Left breast simple mastectomy an infiltrating lobular carcinoma, intermediate grade with the Ohara Gutierrez score 6 out of 92 foci the largest of which is 4 cm in maximal dimension second lesion which is 2.5 cm away measures 0.6 cm.  Is associated with atypical lobular hyperplasia and lobular carcinoma in situ.  She has 2 lymph nodes that appear to be negative for metastatic carcinoma as well  as to ask left axillary sentinel lymph nodes which are also negative for metastatic carcinoma the superficial margin is positive along the inferior aspect.       The following portions of the patient's history were reviewed and updated as appropriate: allergies, current medications, past family history, past medical history, past social history, past surgical history and problem list.    Assessment  Ms. Sommers is a 48-year-old female with a remote history of a right sided breast cancer treated nearly 20 years ago with breast conservation therapy followed by adjuvant chemotherapy and radiation therapy.  She now presents with a AJCC T2 N0 hormone receptor positive lobular carcinoma of the left breast.  She is 2 1/2 weeks post mastectomy in being referred for radiation in the setting of a positive margin.  She still has more information that I think needs to be gathered prior to her being able to proceed with additional treatment.  In short, given the long time span and contralateral nature I assume this is a new primary breast cancer, but we will obviously obtain all of her treatment records from [facility] in [facility] to confirm this.  In addition I'll also obtain her outside radiation records so that we can fully evaluate the previous treatments that she received.  Due to the check 2 mutation, she will need to undergo a screening colonoscopy which will be completed in the upcoming weeks.  In the meantime I think she would very much benefit from being evaluated by physical therapy and undergoing some treatment to try to improve her right upper extremity lymphedema as well as improve the slight swelling she has on the left but also for prevention.  Since she only had a total of 4 lymph nodes removed and she will not require comprehensive lymph node radiation, I think the risk of her having lymphedema on the left is relatively small.  I spent approximate 45 minutes today with the patient obtaining her  history, performing an exam, as well as discussing the general indications for external beam radiation therapy.  The most common indications post mastectomy for chest wall radiation is a large primary tumor greater than 5 cm or multiple positive lymph nodes.  Considering she had multifocal disease, with the largest of foci measuring 4 cm, as well as the fact that she has a questionable margin, I do think she would benefit chest wall radiation to reduce her risk for a local recurrence.  I do not believe she would require comprehensive lymph node radiation and I explained this to the patient.  My plan is to have her be seen and evaluated by physical therapy, go ahead and complete the colorectal screening, and I will likely see her back in a few weeks.  I will then reevaluate her for appropriateness to begin external beam radiation therapy as I would really like to see her improve her mobility in her left upper extremity before we begin.  She had several questions today which I believe I answered to her satisfaction and she was agreeable with the aforementioned plan.  Also, when treating the chest wall we typically deliver radiation over a 5-6 week timeframe.  She informed me that she already has a cruise trip paid in full, which is a family reunion she is intending to take from July 22 to July 26.  In light of this, coordinating the appropriate timing for her radiation treatments may be somewhat problematic as if we start her relatively soon this trip will be right in the middle.  Once she can complete a few weeks of physical therapy, I'll have her back in my clinic and if she decides to proceed with external beam radiation therapy we will go ahead and get her treatment set up and started likely immediately after her trip.    ADDENDUM: 6/19/2018  Mrs. Sommers was seen by Dr. Valle in the interim who has recommended systemic chemotherapy.  She will therefore hold on radiation for the time being.  He will treat her with  chemotherapy, and once this is completed, I can see her back and begin radiation at that point and time.    Return to Clinic for re-eval once chemotherapy is completed.  Tia was seen today for breast cancer.    Diagnoses and all orders for this visit:    Malignant neoplasm of left breast in female, estrogen receptor positive, unspecified site of breast  -     Ambulatory Referral to Physical Therapy Breast care    Thank you for allowing me to participate in the care of this individual.    Sincerely,     Chuck Fowler MD

## 2018-06-18 ENCOUNTER — EDUCATION (OUTPATIENT)
Dept: ONCOLOGY | Facility: HOSPITAL | Age: 48
End: 2018-06-18

## 2018-06-18 ENCOUNTER — OFFICE VISIT (OUTPATIENT)
Dept: ONCOLOGY | Facility: CLINIC | Age: 48
End: 2018-06-18

## 2018-06-18 VITALS
WEIGHT: 160 LBS | DIASTOLIC BLOOD PRESSURE: 96 MMHG | RESPIRATION RATE: 17 BRPM | SYSTOLIC BLOOD PRESSURE: 175 MMHG | TEMPERATURE: 98.1 F | HEIGHT: 66 IN | BODY MASS INDEX: 25.71 KG/M2 | HEART RATE: 96 BPM

## 2018-06-18 DIAGNOSIS — Z17.0 MALIGNANT NEOPLASM OF LEFT BREAST IN FEMALE, ESTROGEN RECEPTOR POSITIVE, UNSPECIFIED SITE OF BREAST (HCC): Primary | ICD-10-CM

## 2018-06-18 DIAGNOSIS — Z17.0 MALIGNANT NEOPLASM OF LEFT BREAST IN FEMALE, ESTROGEN RECEPTOR POSITIVE, UNSPECIFIED SITE OF BREAST (HCC): ICD-10-CM

## 2018-06-18 DIAGNOSIS — C50.912 MALIGNANT NEOPLASM OF LEFT BREAST IN FEMALE, ESTROGEN RECEPTOR POSITIVE, UNSPECIFIED SITE OF BREAST (HCC): ICD-10-CM

## 2018-06-18 DIAGNOSIS — C50.912 MALIGNANT NEOPLASM OF LEFT BREAST IN FEMALE, ESTROGEN RECEPTOR POSITIVE, UNSPECIFIED SITE OF BREAST (HCC): Primary | ICD-10-CM

## 2018-06-18 PROCEDURE — 99214 OFFICE O/P EST MOD 30 MIN: CPT | Performed by: INTERNAL MEDICINE

## 2018-06-18 RX ORDER — LIDOCAINE AND PRILOCAINE 25; 25 MG/G; MG/G
CREAM TOPICAL AS NEEDED
Qty: 30 G | Refills: 3 | Status: SHIPPED | OUTPATIENT
Start: 2018-06-18 | End: 2018-11-16

## 2018-06-18 RX ORDER — SODIUM CHLORIDE 9 MG/ML
250 INJECTION, SOLUTION INTRAVENOUS ONCE
Status: CANCELLED | OUTPATIENT
Start: 2018-07-06

## 2018-06-18 RX ORDER — DEXAMETHASONE 4 MG/1
TABLET ORAL
Qty: 12 TABLET | Refills: 3 | Status: SHIPPED | OUTPATIENT
Start: 2018-06-18 | End: 2018-11-16

## 2018-06-18 RX ORDER — PALONOSETRON 0.05 MG/ML
0.25 INJECTION, SOLUTION INTRAVENOUS ONCE
Status: CANCELLED | OUTPATIENT
Start: 2018-07-06

## 2018-06-18 RX ORDER — ONDANSETRON HYDROCHLORIDE 8 MG/1
8 TABLET, FILM COATED ORAL 3 TIMES DAILY PRN
Qty: 30 TABLET | Refills: 5 | Status: SHIPPED | OUTPATIENT
Start: 2018-06-18 | End: 2018-11-16

## 2018-06-18 NOTE — PLAN OF CARE
Outpatient Infusion • [address] [facility] [address] [facility] • [address] • [place] [id number] • [phone]      CHEMOTHERAPY EDUCATION SHEET    NAME:  Tia Sommers      : 1970           DATE: 18    Booklets Given: Chemotherapy and You []  Eating Hints []    Sexuality/Fertility Books []     Chemotherapy/Biotherapy Education Sheets: (list all that apply)  Taxotere, Cytoxan                                                                                                                                                                Chemotherapy Regimen:    Taxotere + Cytoxan every 21 days  TOPICS EDUCATION PROVIDED EDUCATION REINFORCED COMMENTS   ANEMIA:  role of RBC, cause, s/s, ways to manage, role of transfusion [x] [] Role and function of RBCs were discussed, advised that patient may feel fatigued.   THROMBOCYTOPENIA:  role of platelet, cause, s/s, ways to prevent bleeding, things to avoid, when to seek help [x] [] Discussed that a reduction in platelets results in an increased risk of bleeding.    NEUTROPENIA:  role of WBC, cause, infection precautions, s/s of infection, when to call MD [x] [] Discussed the signs and symptoms of infection, and to call the MD if a temperature above 100.4 is observed.    NUTRITION & APPETITE CHANGES:  importance of maintaining healthy diet & weight, ways to manage to improve intake, dietary consult, exercise regimen [] []    DIARRHEA:  causes, s/s of dehydration, ways to manage, dietary changes, when to call MD [x] [] Discussed the use of over the counter loperamide, and to call the MD if diarrhea persists despite the use of anti-diarrheals.   CONSTIPATION:  causes, ways to manage, dietary changes, when to call MD [x] [] Discussed that this regimen also may results in constipation.   NAUSEA & VOMITING:  cause, use of antiemetics, dietary changes, when to call MD [x] [] Discussed the use of ondansetron for nausea, and advised to call the MD if nausea or emesis occurs despite the  use of anti-emetics.   MOUTH SORES:  causes, oral care, ways to manage [x] [] Discussed the use of a baking soda, salt, and water mouthwash after meals and before bedtime.   ALOPECIA:  cause, ways to manage, resources [] []    INFERTILITY & SEXUALITY:  causes, fertility preservation options, sexuality changes, ways to manage, importance of birth control [x] [] Discussed proper contraceptive methods for 48hrs after chemotherapy, as well as the importance of not becoming pregnant during chemotherapy.   NERVOUS SYSTEM CHANGES:  causes, s/s, neuropathies, cognitive changes, ways to manage [x] [] Discussed the potential for neuropathies, and when to contact the MD.   PAIN:  causes, ways to manage [] [] ????   SKIN & NAIL CHANGES:  cause, s/s, ways to manage [x] [] Discussed the importance of using sunscreen, as the patient is more likely to get sunburnt.    ORGAN TOXICITIES:  cause, s/s, need for diagnostic tests, labs, when to notify MD [x] [] Informed the patient that we would be monitoring liver and kidney function and blood counts before chemotherapy.   SURVIVORSHIP:  distress, distress assessment, secondary malignancies, early/late effects, follow-up, social issues, social support [] []    HOME CARE:  use of spill kits, storing of PO chemo, how to manage bodily fluids [] []    MISCELLANEOUS:  drug interactions, administration, vesicant, et [x] [] Advised the patient to use over the counter loratadine to help prevent bone pain from pegfilgrastim.      Referrals:     Notes: Consent to chemotherapy was obtained and documented.

## 2018-06-18 NOTE — PROGRESS NOTES
CHIEF COMPLAINT: Management of stage IIa breast cancer    Problem List:     Breast cancer, left    5/16/2018 Initial Diagnosis     Stage IB [id number] Breast cancer, left: Had bilateral mastectomy on 5/16/18 with benign disease on the right.  The left breast had infiltrating lobular carcinoma, Bloom Gutierrez 6 out of 9, 2 foci largest being 4 cm, 4 total nodes and 2 sentinel nodes taken all negative.  There was a positive superficial margin.  Biopsy on 3/28/18 of abnormality found on screening mammogram was 95% 3+ positive ER and 95% 3+ positive ND and HER-2/taiwo 0+.  Baseline liver enzymes abnormal ALT 79 AST 55.         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  CHEK2 also causes an increased risk for colon cancer (up to 9.5% lifetime risk).           6/18/2018 -  Other Event     Oncotype score 28 with 18% 10 year risk of distant recurrence on tamoxifen alone.  There is about a 6% lower risk of distant recurrence when chemotherapy is added to the hormonal blockade per this prediction.  The Oncotype showed her to be ER positive and ND negative and HER-2/taiwo negative.            HISTORY OF PRESENT ILLNESS:  The patient is a 48 y.o. female, here for follow up on management of Stage IIa ER positive ND negative HER-2/taiwo 0+ breast cancer with intermediate Oncotype score of 28 with 18% 10 year distant risk recurrence on tamoxifen alone.  Had a little bit of the seroma but that's healing well.  His seen radiation oncology.  His out of town July 22 through the 26th.      Past Medical History:   Diagnosis Date   • Arthritis     minna knees and hands   • Breast cancer 2000    dx in 1998;  2018   • Drug therapy 2000   • Hx of radiation therapy 2000   • Lupus 2014   • Lymphedema     right arm     Past Surgical History:   Procedure Laterality Date   • BONE BIOPSY      back   • BREAST BIOPSY Right    • BREAST LUMPECTOMY  "Right 2000   • COLONOSCOPY      > 10 years   • DIAGNOSTIC LAPAROSCOPY EXPLORATORY LAPAROTOMY     • ENDOSCOPY     • MASTECTOMY W/ SENTINEL NODE BIOPSY Bilateral 5/16/2018    Procedure: BREAST MASTECTOMY BILATERAL WITH LEFT SENTINEL NODE BIOPSY;  Surgeon: Keven Dye MD;  Location: [facility] OR;  Service: General   • SPLENECTOMY     • VENOUS ACCESS DEVICE (PORT) INSERTION     • VENOUS ACCESS DEVICE (PORT) REMOVAL  2003       Allergies   Allergen Reactions   • Iodine Anaphylaxis   • Penicillins Anaphylaxis   • Latex Rash       Family History and Social History reviewed and changed as necessary      REVIEW OF SYSTEM:   Review of Systems   Constitutional: Negative for appetite change, chills, diaphoresis, fatigue, fever and unexpected weight change.   HENT:   Negative for mouth sores, sore throat and trouble swallowing.    Eyes: Negative for icterus.   Respiratory: Negative for cough, hemoptysis and shortness of breath.    Cardiovascular: Negative for chest pain, leg swelling and palpitations.   Gastrointestinal: Negative for abdominal distention, abdominal pain, blood in stool, constipation, diarrhea, nausea and vomiting.   Endocrine: Negative for hot flashes.   Genitourinary: Negative for bladder incontinence, difficulty urinating, dysuria, frequency and hematuria.    Musculoskeletal: Negative for gait problem, neck pain and neck stiffness.   Skin: Negative for rash.   Neurological: Negative for dizziness, gait problem, headaches, light-headedness and numbness.   Hematological: Negative for adenopathy. Does not bruise/bleed easily.   Psychiatric/Behavioral: Negative for depression. The patient is not nervous/anxious.    All other systems reviewed and are negative.       PHYSICAL EXAM    Vitals:    06/18/18 1448   BP: 175/96   Pulse: 96   Resp: 17   Temp: 98.1 °F (36.7 °C)   TempSrc: Temporal Artery    Weight: 72.6 kg (160 lb)   Height: 167.6 cm (65.98\")     Constitutional: Appears well-developed and well-nourished. No " distress.   ECOG: (0) Fully active, able to carry on all predisease performance without restriction  HENT:   Head: Normocephalic.   Mouth/Throat: Oropharynx is clear and moist.   Eyes: Conjunctivae are normal. Pupils are equal, round, and reactive to light. No scleral icterus.   Neck: Neck supple. No JVD present. No thyromegaly present.   Cardiovascular: Normal rate, regular rhythm and normal heart sounds.    Pulmonary/Chest: Breath sounds normal. No respiratory distress.   Abdominal: Soft. Exhibits no distension and no mass. There is no hepatosplenomegaly. There is no tenderness. There is no rebound and no guarding.   Musculoskeletal:Exhibits no edema, tenderness or deformity.   Neurological: Alert and oriented to person, place, and time. Exhibits normal muscle tone.   Skin: No ecchymosis, no petechiae and no rash noted. Not diaphoretic. No cyanosis. Nails show no clubbing.   Psychiatric: Normal mood and affect.   Vitals reviewed.  Bilateral chest wall benign    No radiology results for the last 7 days          ASSESSMENT & PLAN:    1.  Stage IIa ER positive TX negative HER-2/taiwo negative intermediate risk Oncotype breast cancer  2. Mild elevation of liver enzymes with CT negative for metastasis.  I reviewed images and reports thereof.  3.   Chek2 mutation conferring higher risk of colon as well as breast cancer.    Discussion: when she is done with her breast cancer treatments she will need colonoscopic screening with Dr. Dye.  With her intermediate risk Oncotype score of 28 I would recommend 4 courses of Taxotere Cytoxan with a presumed adjuvant benefit of chemotherapy adding to hormonal therapy by about 4-5% on the risk of distant recurrence.  We will see her back after her vacation ends July 26 for course #2 and she will see my nurse practitioner.  After fourth course of Taxotere and Cytoxan we will get her back to Dr. Fowler for consolidative radiation and if she is menopausal we will use Arimidex and if  not we will give consideration of Lupron plus Arimidex versus tamoxifen.  Risks of Taxotere and Cytoxan were discussed in detail and she was given an informational sheets on each and signed consent from our pharmacy indicating our intent to attempt to cure was signed.  Discussed with patient 40 minutes greater than 50% spent counseling.      Chad Valle MD    06/18/2018

## 2018-06-19 ENCOUNTER — TELEPHONE (OUTPATIENT)
Dept: RADIATION ONCOLOGY | Facility: HOSPITAL | Age: 48
End: 2018-06-19

## 2018-06-19 NOTE — TELEPHONE ENCOUNTER
Pt called stating she is to start chemo 7/6/18 with Dr. Valle and wonders if she need to be seen by Dr. Fowler this week. After discussion with Dr. Fowler I called the patient back and told her to notify us when chemo is finished and we will see her for radiation treatments. Pt verbalized understanding. Pt also notified to call if she has not been contacted by physical therapy within the next few days as she states she has not been contacted for the referral yet. Pt agrees

## 2018-06-20 ENCOUNTER — DOCUMENTATION (OUTPATIENT)
Dept: ONCOLOGY | Facility: CLINIC | Age: 48
End: 2018-06-20

## 2018-06-20 DIAGNOSIS — Z17.0 MALIGNANT NEOPLASM OF LEFT BREAST IN FEMALE, ESTROGEN RECEPTOR POSITIVE, UNSPECIFIED SITE OF BREAST (HCC): Primary | ICD-10-CM

## 2018-06-20 DIAGNOSIS — C50.912 MALIGNANT NEOPLASM OF LEFT BREAST IN FEMALE, ESTROGEN RECEPTOR POSITIVE, UNSPECIFIED SITE OF BREAST (HCC): Primary | ICD-10-CM

## 2018-06-20 NOTE — PROGRESS NOTES
6/20/2018  ~per Ro  Patient came in today with forms to be completed.  Patient signed MARYELLEN.  Patient wants to be billed for copay.  All info in MA box.  Patient states she needs back ASAP.    Rec'd from basket.    6/22/2018  Completed, attached MR, & put in MD box.    6/25/2018  Rec'd with MD signature.  Faxed to [employer] @ fax#[phone].

## 2018-06-25 LAB
CYTO UR: NORMAL
LAB AP CASE REPORT: NORMAL
LAB AP CLINICAL INFORMATION: NORMAL
LAB AP DIAGNOSIS COMMENT: NORMAL
Lab: NORMAL
Lab: NORMAL
PATH REPORT.ADDENDUM SPEC: NORMAL
PATH REPORT.FINAL DX SPEC: NORMAL
PATH REPORT.GROSS SPEC: NORMAL

## 2018-06-28 ENCOUNTER — HOSPITAL ENCOUNTER (OUTPATIENT)
Dept: PHYSICAL THERAPY | Facility: HOSPITAL | Age: 48
Setting detail: THERAPIES SERIES
Discharge: HOME | End: 2018-06-28
Attending: SURGERY

## 2018-06-28 ENCOUNTER — TRANSCRIBE ORDERS (OUTPATIENT)
Dept: ADMINISTRATIVE | Facility: HOSPITAL | Age: 48
End: 2018-06-28

## 2018-06-28 DIAGNOSIS — L90.5 SCAR CONDITIONS AND FIBROSIS OF SKIN: ICD-10-CM

## 2018-06-28 DIAGNOSIS — N64.4 MASTODYNIA OF LEFT BREAST: ICD-10-CM

## 2018-06-28 DIAGNOSIS — C50.512 MALIGNANT NEOPLASM OF LOWER-OUTER QUADRANT OF LEFT FEMALE BREAST, UNSPECIFIED ESTROGEN RECEPTOR STATUS (HCC): Primary | ICD-10-CM

## 2018-06-28 DIAGNOSIS — I97.2 POST-MASTECTOMY LYMPHEDEMA SYNDROME: Primary | ICD-10-CM

## 2018-06-28 DIAGNOSIS — R68.89 IMPAIRED FUNCTION OF UPPER EXTREMITY: ICD-10-CM

## 2018-06-28 DIAGNOSIS — I89.0 LYMPHEDEMA OF RIGHT UPPER EXTREMITY: ICD-10-CM

## 2018-06-28 PROCEDURE — 97162 PT EVAL MOD COMPLEX 30 MIN: CPT | Performed by: PHYSICAL THERAPIST

## 2018-06-28 PROCEDURE — 97140 MANUAL THERAPY 1/> REGIONS: CPT | Performed by: PHYSICAL THERAPIST

## 2018-06-28 NOTE — THERAPY EVALUATION
Physical Therapy Lymphedema Initial Evaluation  [facility]     Patient Name: Tia Sommers  : 1970  MRN: [id number]  Today's Date: 2018      Visit Date: 2018    Visit Dx:    ICD-10-CM ICD-9-CM   1. 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 Lymphedema of right upper extremity I89.0 457.1   3. Scar conditions and fibrosis of skin L90.5 709.2   4. Mastodynia of left breast N64.4 611.71   5. Impaired function of upper extremity R68.89 V49.5       Patient Active Problem List   Diagnosis   • Breast cancer, left        Past Medical History:   Diagnosis Date   • Arthritis     minna knees and hands   • Breast cancer     dx in ;     • Drug therapy    • Hx of radiation therapy    • Lupus    • Lymphedema     right arm        Past Surgical History:   Procedure Laterality Date   • BONE BIOPSY      back   • BREAST BIOPSY Right    • BREAST LUMPECTOMY Right    • COLONOSCOPY      > 10 years   • DIAGNOSTIC LAPAROSCOPY EXPLORATORY LAPAROTOMY     • ENDOSCOPY     • MASTECTOMY W/ SENTINEL NODE BIOPSY Bilateral 2018    Procedure: BREAST MASTECTOMY BILATERAL WITH LEFT SENTINEL NODE BIOPSY;  Surgeon: Keven Dye MD;  Location: [facility];  Service: General   • SPLENECTOMY     • VENOUS ACCESS DEVICE (PORT) INSERTION     • VENOUS ACCESS DEVICE (PORT) REMOVAL         Visit Dx:    ICD-10-CM ICD-9-CM   1. 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 Lymphedema of right upper extremity I89.0 457.1   3. Scar conditions and fibrosis of skin L90.5 709.2   4. Mastodynia of left breast N64.4 611.71   5. Impaired function of upper extremity R68.89 V49.5             Patient History     Row Name 18 1415          Chief Complaint Difficulty with daily activities;Muscle weakness;Pain;Swelling;Tightness  -    Type of Pain Shoulder pain;Other pain   mastectomy sites/ axilla   -MW    Date Current Problem(s) Began 05/15/18  -MW    Brief Description of Current Complaint  "Pt presents to PT for lymphedema care and ROM for UE's. Pt reports some massage therapy and pump use in 2000 after Rt lumpectomy for RUE lymphedema. She had a sleeve and glove in 2000 but did not get another when the first one wore out. She notes her Rt arm is in \"good\" shape today. Reports mild swelling at Lt wrist and fullness along Lt lateral trunk/ anterior axilla. Pt's primary concerns are to improve mobility LT>> Rt shoulders and manage lymphedema/ get a new sleeve for Rt and Lt if needed.   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   -MW    Patient seeing anyone else for problem(s)? Dr Dye, Dr Fowler, Dr Valle   -MW    How has patient tried to help current problem? Elevates RUE some, working on stretching shoulders   -MW    Are you or can you be pregnant No  -MW          Pain Location Chest;Shoulder  -MW    Pain at Present 4  -MW    Pain at Worst 10  -MW    Pain Frequency Constant/continuous;Intermittent   constant low level pain, intermittent intense pain   -MW    What Performance Factors Make the Current Problem(s) WORSE? reaching overhead with Lt   -MW    Difficulties at work? off work   -MW    Difficulties with ADL's? Washing back, fixing hair   -MW          Any falls in the past year: Yes  -MW    Number of falls reported in the last 12 months >5   -MW    Factors that contributed to the fall: Other (comment)   knees \"give out\"   -MW    Does patient have a fear of falling Yes (comment)  -MW          Primary Language English  -MW    Are you able to read Yes  -MW    Are you able to write Yes  -MW    How does patient learn " best? Demonstration;Reading  -MW    Teaching needs identified Management of Condition;Home Exercise Program  -MW    Patient is concerned about/has problems with Difficulty with self care (i.e. bathing, dressing, toileting:;Performing home management (household chores, shopping, care of dependents);Performing job responsibilities/community activities (work, school,;Reaching over head;Repetitive movements of the hand, arm, shoulder;Grasping objects lifting  -MW    Does patient have problems with the following? Panic Attack  -MW    Barriers to learning None  -MW    Pt Participated in POC and Goals Yes  -MW          Are you being hurt, hit, or frightened by anyone at home or in your life? No  -MW    Are you being neglected by a caregiver No  -MW      User Key  (r) = Recorded By, (t) = Taken By, (c) = Cosigned By    Initials Name Provider Type    MW Jennifer Noriega, PT Physical Therapist                Lymphedema     Row Name 06/28/18 2711          Lymphedema Classification RUE:;secondary;stage 2 (Spontaneously Irreversible);LUE:;at risk/stage 0  -MW    Lymphedema Cancer Related Sx lumpectomy;axillary dissection;simple mastectomy;sentinel node biopsy  -MW    Lymphedema Surgery Comments RT lumphectomy in 2000 with axillary dissection (14 nodes); 5/2018 bilat mastectomies with LT SND.   -MW    Lymph Nodes Removed # 14   14 Rt, 2 Lt   -MW    Positive Lymph Nodes # 0  -MW    Stage of Cancer Stage II   ER +, DE +, HER2/taiwo -   -MW    Cancer Comments Second BrCA   -MW    Chemo Received yes  -MW    Chemo Treatments #/Timeframe unknown details; 2000   -MW    Radiation Therapy Received yes  -MW    Radiation Treatments #/Timeframe unknown details in 2000  -MW    Lymphedema Assessment Comments Pt expecting to have port placed 6/29/18 and chemo to start next week; then will have XRT post chemo.   -MW          Ptting Edema Category By severity  -MW    Pitting Edema Moderate;Mild   RUE moderate; Lt trunk mild; LUE very mild   -MW     "Edema Assessment Comment RUE soft edema   -MW          Location/Assessment Upper Extremity;Upper Quadrant  -MW    Upper Extremity Conditions bilateral:;intact;clean;left:;other (comment)   LT multiple tatoos   -MW    Upper Extremity Color/Pigment bilateral:   WNL for ethnicity   -MW    Upper Quadrant Conditions bilateral:;intact;clean;scab(s)  -MW    Upper Quadrant Color/Pigment bilateral:;other (comment)   WNL for ethnicity   -MW    Upper Quadrant Skin Details bilat mastectomy incision line scars; Lt extends more laterally and has tissue folds obscuring the ends. Drain sites bilat.   -MW          Lymphedema Sensation Comments Tender along anterior axillary borders, \"numb\" adjacent to incisions.   -MW          Lymphedema Pulses/Capillary Refill upper extremity pulses;capillary refill  -MW    Radial Pulse right:;left:;+2 normal  -MW    Capillary Refill upper extremity capillary refill  -MW    Upper Extremity Capillary Refill right:;left:;less than 3 seconds  -MW          Measurement Type(s) Volumetric  -MW    Volumetric Areas Upper extremities  -MW    Volumetric UE Distance interval (cm) 4  -MW          Reference Point 0 17.8  -MW    4 18.5 cm  -MW    8 16 cm  -MW    12 16.8 cm  -MW    16 18.8 cm  -MW    20 21 cm  -MW    24 23.8 cm  -MW    28 25 cm  -MW    32 25 cm  -MW    36 28 cm  -MW    40 29.8 cm  -MW    44 30.2 cm  -MW    48 30.4 cm  -MW    52 30 cm  -MW    LUE Volume Calculation- 4cm 2514.5  -MW          Reference Point 0 17.8  -MW    4 18 cm  -MW    8 15.7 cm  -MW    12 18.8 cm  -MW    16 22.4 cm  -MW    20 25.3 cm  -MW    24 27.7 cm  -MW    28 28.2 cm  -MW    32 27 cm  -MW    36 31 cm  -MW    40 32 cm  -MW    44 30.8 cm  -MW    48 30 cm  -MW    52 30.5 cm  -MW    RUE Volume Calculation- 4cm 2903.4  -MW          Manual Lymphatic Drainage initial sequence;opened regional lymph nodes;opened anastamoses;extremity treatment  -MW    Initial Sequence supraclavicular;shoulder collectors  -MW    Supraclavicular " "right;left  -MW    Shoulder Collectors right;left  -MW    Opened Regional Lymph Nodes inguinal  -MW    Inguinal right;left  -MW    Opened Anastamoses axillo-inguinal  -MW    Axillo-Inguinal right;left  -MW    Extremity Treatment MLD to full limb;simple/brief MLD  -MW    Simple/Brief MLD Lt lateral trunk   -MW    MLD to Full Limb Rt lateral trunk and RUE   -MW    Manual Lymphatic Drainage Comments Initiated MLD in seated on massage chair; supraclavicular nodes, shoulder collectors, posterior lateral trunk   -MW          Compression/Skin Care skin care;wrapping location;bandaging  -MW    Skin Care moisturizing lotion applied  -MW    Wrapping Location upper extremity  -MW    Wrapping Location UE right:;hand to axilla;left:;hand;forearm  -MW    Bandage Layers cotton elastic stocking- single layer (comment size);cotton elastic stocking- double layer (comment size)  -MW    Bandaging Comments RUE: compressogrip 3 to hand and forearm, size 4 to forearm and upperarm; both sizes doubled distally for gradient compression. LUE size 3 to hand and wrist with double layer at wrist over area of focused edema.   -MW    Compression/Skin Care Comments Provided additional set of compressogrips for pt to wash/ wear   -MW      User Key  (r) = Recorded By, (t) = Taken By, (c) = Cosigned By    Initials Name Provider Type    MW Jennifer Noriega PT Physical Therapist                  PT Ortho     Row Name 06/28/18 5612       Subjective Comments    Subjective Comments \"I've been doing better with the Rt than the Lt\"   -MW       Precautions and Contraindications    Precautions/Limitations fall precautions  -MW       Subjective Pain    Able to rate subjective pain? yes  -MW    Pre-Treatment Pain Level 4  -MW    Post-Treatment Pain Level 4  -MW       Posture/Observations    Alignment Options Rounded shoulders  -MW    Rounded Shoulders Bilateral:;Mild;Moderate  -MW    Posture/Observations Comments 3+ finger width shoulders forward on treatment " "table   -MW       Quarter Clearing    Quarter Clearing Upper Quarter Clearing  -MW       Myotomal Screen- Upper Quarter Clearing    Shoulder flexion (C5) Right:;4 (Good);Left:;3 (Fair)   pain Lt>Rt   -MW    Elbow flexion/wrist extension (C6) Bilateral:;4 (Good)  -MW    Elbow extension/wrist flexion (C7) Bilateral:;4 (Good)  -MW    Finger flexion/ (C8) Bilateral:;4 (Good)  -MW    Finger abduction (T1) Bilateral:;4+ (Good +)  -MW       General ROM    RT Upper Ext Rt Shoulder ABduction;Rt Shoulder Extension;Rt Shoulder Flexion;Rt Shoulder External Rotation;Rt Shoulder Internal Rotation;Comments  -MW    LT Upper Ext Lt Shoulder ABduction;Lt Shoulder Extension;Lt Shoulder Flexion;Lt Shoulder External Rotation;Lt Shoulder Internal Rotation;Lt Elbow Extension/Flexion;Comment  -MW       Right Upper Ext    Rt Shoulder Abduction AROM 80 c/o pulling and \"heavy arm\"   -MW    Rt Shoulder Extension AROM 58  -MW    Rt Shoulder Flexion AROM 105 c/o pulling and pain ant & post axilla   -MW    Rt Shoulder External Rotation AROM 30 pulling   -MW    Rt Shoulder Internal Rotation AROM WFL; hand to mid back   -MW    Rt Upper Extremity Comments  elbow, wrist and hand WFL except for \"arthritis\" index finger  -MW       Left Upper Ext    Lt Shoulder Abduction AROM 60 pain / pulling chest   -MW    Lt Shoulder Extension AROM 50   -MW    Lt Shoulder Flexion AROM 75 pain and pulling chest and axilla   -MW    Lt Shoulder External Rotation AROM 20 pain   -MW    Lt Shoulder Internal Rotation AROM only to body; unable to bring hand to back   -MW    Lt Upper Extremity Comments  Elbow, wrist and hand grossly WFL   -MW        Strength Right    # Reps 3  -MW    Right Rung 2  -MW    Right  Test 1 40   c/o pain in hand   -MW    Right  Test 2 28  -MW    Right  Test 3 30  -MW     Strength Average Right 32.67  -MW        Strength Left    # Reps 3  -MW    Left Rung 2  -MW    Left  Test 1 28  -MW    Left  Test 2 30  -MW    " "Left  Test 3 40   c/o pain in hand  -MW     Strength Average Left 32.67  -MW       Hand  Strength     Strength Affected Side Bilateral  -MW      User Key  (r) = Recorded By, (t) = Taken By, (c) = Cosigned By    Initials Name Provider Type    ALICIA Noriega PT Physical Therapist                    Therapy Education  Education Details: HEP LUE pendulums; compression layering   Given: Edema management, Symptoms/condition management, HEP, Pain management  Program: New  How Provided: Demonstration, Verbal  Provided to: Patient  Level of Understanding: Verbalized, Demonstrated, Teach back education performed            Exercises     Row Name 06/28/18 1415             Subjective Comments    Subjective Comments \"I've been doing better with the Rt than the Lt\"   -MW         Subjective Pain    Able to rate subjective pain? yes  -MW      Pre-Treatment Pain Level 4  -MW      Post-Treatment Pain Level 4  -MW         Total Minutes    86525 - PT Manual Therapy Minutes 35  -MW         Exercise 1    Exercise Name 1 LUE pendulum   -MW      Cueing 1 Demo;Verbal  -MW      Reps 1 5  -MW      Additional Comments A-P swings, circles CW and CCW   -MW        User Key  (r) = Recorded By, (t) = Taken By, (c) = Cosigned By    Initials Name Provider Type    ALICIA Noriega, PT Physical Therapist                       Manual Rx (last 36 hours)      Manual Treatments     Row Name 06/28/18 1415             Total Minutes    53658 - PT Manual Therapy Minutes 35  -MW         Manual Rx 1    Manual Rx 1 Location Bilat UT/ scapular regions   -MW      Manual Rx 1 Type STM with tissue lifting, bending and space correction techniques   -MW      Manual Rx 1 Grade mild to moderate   -MW      Manual Rx 1 Duration 10  -MW         Manual Rx 2    Manual Rx 2 Location Bilat chest / mastectomy sites   -MW      Manual Rx 2 Type STM with tissue bending at lateral pectoralis border  -MW      Manual Rx 2 Grade gentle/ limited by discomfort   " -MW      Manual Rx 2 Duration 5  -MW        User Key  (r) = Recorded By, (t) = Taken By, (c) = Cosigned By    Initials Name Provider Type    ALICIA Noriega, PT Physical Therapist                PT OP Goals     Row Name 06/28/18 1415          STG Date to Achieve 07/26/18  -    STG 1 Pt independent with basic HEP for shoulder ROM and posture.   -MW    STG 2 RUE lymphedema to decrease at least 10%.   -MW    STG 3 LT shoulder flexibility to improve at least 15 degrees to improve self care and prepare for positioning for XRT.   -MW    STG 4 Pt to report decreased pain ratings on DASH to 3/5 or less.   -MW          LTG 1 Pt independent with advanced HEP for posture, flexibility and strengthening to promote safe self care.   -MW    LTG 2 Pt to demonstrate improved function with at least 20% improvement on DASH score.   -MW    LTG 3 Bilat shoulder flexion to be greater than 160 deg, for pt to be able to achieve positioning for XRT.   -MW    LTG 4 Pt measured and fit with RUE compression sleeve/ glove and Lt prn.   -MW    LTG 5 Pt to be independent with self lymphedema care; including use of compression pump, compression sleeves/ gloves and skin care.   -          PT Goal Re-Cert Due Date 09/28/18  -      User Key  (r) = Recorded By, (t) = Taken By, (c) = Cosigned By    Initials Name Provider Type    ALICIA Noriega PT Physical Therapist                PT Assessment/Plan     Row Name 06/28/18 1415          PT Assessment    Functional Limitations Performance in self-care ADL;Limitation in home management;Performance in work activities  -     Impairments Edema;Impaired lymphatic circulation;Impaired postural alignment;Muscle strength;Pain;Range of motion;Sensation  -     Assessment Comments Pt with complex situation of chronic RUE lymphedema and new bilateral mastectomies with new onset Lt trunk lymphedema and at risk LUE. LUE is her dominant hand but she as severe shoulder ROM and strength limitations. She  has been unable to return to work due to pain, ROM and strength limitations. She is also impacted by her social situation, as she lives alone and has no family support and very limited friend support. She is also expected to begin chemo next week and then radiation therapy there after which will increase her risk of LUE lymphedema. She also currently is not able to achieve LUE positioning for XRT. She is expected to benefit from PT to address her soft tissue and functional deficits.   -MW     Please refer to paper survey for additional self-reported information Yes  -MW     Rehab Potential Good  -MW     Patient/caregiver participated in establishment of treatment plan and goals Yes  -MW     Patient would benefit from skilled therapy intervention Yes  -MW        PT Plan    PT Frequency 2x/week  -MW     Predicted Duration of Therapy Intervention (Therapy Eval) 12 weeks/ 24 visits   -MW     Planned CPT's? PT EVAL MOD COMPLELITY: 13409;PT MANUAL THERAPY EA 15 MIN: 03450;PT THER PROC EA 15 MIN: 88961  -MW     Physical Therapy Interventions (Optional Details) manual therapy techniques;manual lymphatic drainage;postural re-education;ROM (Range of Motion);stretching;strengthening;bandaging;home exercise program  -MW     PT Plan Comments Cont CDT-MLD; STM; initiate ASTYM prn; compression wrapping; progress HEP   -MW       User Key  (r) = Recorded By, (t) = Taken By, (c) = Cosigned By    Initials Name Provider Type    ALICIA Noriega, PT Physical Therapist             Outcome Measure Options: Disabilities of the Arm, Shoulder, and Hand (DASH)  DASH  Open a tight or new jar.: Moderate Difficulty  Write: Mild Difficulty  Turn a key: Mild Difficulty  Prepare a meal: Moderate Difficulty  Push open a heavy door: Severe Difficulty  Place an object on a shelf above your head: Severe Difficulty  Do heavy household chores (e.g., wash walls, wash floors): Severe Difficulty  Garden or do yard work: Unable  Make a bed: Mild  Difficulty  Carry a shopping bag or briefcase: Mild Difficulty  Carry a heavy object (over 10 lbs): Severe Difficulty  Change a lightbulb overhead: Unable  Wash or blow dry your hair: Unable  Wash your back: Unable  Put on a pullover sweater: Severe Difficulty  Use a knife to cut food: Mild Difficulty  Recreational activities in which require little effort (e.g., cardplaying, knitting, etc.): Mild Difficulty  Recreational activities in which you take some force or impact through your arm, should or hand (e.g. golf, hammering, tennis, etc.): Unable  Recreational Activities in which you move your arm freely (e.g., frisbee, badminton, etc.): Moderate Difficulty  Manage transportation needs (getting from one place to another): Mild Difficulty  Sexual Activities: No Difficulty  During the past week, to what extent has your arm, shoulder, or hand problem interfered with your normal social activites with family, friends, neighbors or groups?: Quite a bit  During the past week, were you limited in your work or other regular daily activities as a result of your arm, shoulder or hand problem?: Unable  Arm, Shoulder, or hand pain: Severe  Arm, shoulder or hand pain when you performed any specific activity: Extreme  Tingling (pins and needles) in your arm, shoulder, or hand: Moderate  Weakness in your arm, shoulder or hand: Severe  Stiffness in your arm, shoulder or hand: Moderate  During the past week, how much difficulty have you had sleeping because of the pain in your arm, shoulder or hand?: Mild Difficulty  I feel less capable, less confident or less useful because of my arm, shoulder or hand problem: Agree  DASH Sum : 103  Number of Questions Answered: 30  DASH Score: 60.83         Time Calculation:   Start Time: 1415  Total Timed Code Minutes- PT: 35 minute(s)   Therapy Suggested Charges     Code   Minutes Charges    79045 (CPT®) Hc Pt Neuromusc Re Education Ea 15 Min      79082 (CPT®) Hc Pt Ther Proc Ea 15 Min       50923 (CPT®) Hc Gait Training Ea 15 Min      31174 (CPT®) Hc Pt Therapeutic Act Ea 15 Min      77797 (CPT®) Hc Pt Manual Therapy Ea 15 Min 35 2    44484 (CPT®) Hc Pt Ther Massage- Per 15 Min      12432 (CPT®) Hc Pt Iontophoresis Ea 15 Min      95407 (CPT®) Hc Pt Elec Stim Ea-Per 15 Min      15523 (CPT®) Hc Pt Ultrasound Ea 15 Min      49438 (CPT®) Hc Pt Self Care/Mgmt/Train Ea 15 Min      Total  35 2        Therapy Charges for Today     Code Description Service Date Service Provider Modifiers Qty    08352476374 HC PT MANUAL THERAPY EA 15 MIN 6/28/2018 Jennifer Noriega, PT GP 2    29477612067 HC PT EVAL MOD COMPLEXITY 4 6/28/2018 Jennifer Noriega, PT GP 1          PT G-Codes  Outcome Measure Options: Disabilities of the Arm, Shoulder, and Hand (DASH)         Jennifer Noriega, PT  6/28/2018

## 2018-06-29 ENCOUNTER — HOSPITAL ENCOUNTER (OUTPATIENT)
Dept: GENERAL RADIOLOGY | Facility: HOSPITAL | Age: 48
Discharge: HOME | End: 2018-06-29
Attending: SURGERY

## 2018-06-29 DIAGNOSIS — C50.512 MALIGNANT NEOPLASM OF LOWER-OUTER QUADRANT OF LEFT FEMALE BREAST, UNSPECIFIED ESTROGEN RECEPTOR STATUS (HCC): ICD-10-CM

## 2018-06-29 PROCEDURE — 71045 X-RAY EXAM CHEST 1 VIEW: CPT

## 2018-07-02 ENCOUNTER — DOCUMENTATION (OUTPATIENT)
Dept: OTHER | Facility: HOSPITAL | Age: 48
End: 2018-07-02

## 2018-07-02 ENCOUNTER — TELEPHONE (OUTPATIENT)
Dept: SOCIAL WORK | Facility: HOSPITAL | Age: 48
End: 2018-07-02

## 2018-07-02 ENCOUNTER — HOSPITAL ENCOUNTER (OUTPATIENT)
Dept: PHYSICAL THERAPY | Facility: HOSPITAL | Age: 48
Setting detail: THERAPIES SERIES
Discharge: HOME | End: 2018-07-02
Attending: SURGERY

## 2018-07-02 DIAGNOSIS — N64.4 MASTODYNIA OF LEFT BREAST: ICD-10-CM

## 2018-07-02 DIAGNOSIS — I97.2 POST-MASTECTOMY LYMPHEDEMA SYNDROME: Primary | ICD-10-CM

## 2018-07-02 DIAGNOSIS — L90.5 SCAR CONDITIONS AND FIBROSIS OF SKIN: ICD-10-CM

## 2018-07-02 DIAGNOSIS — I89.0 LYMPHEDEMA OF RIGHT UPPER EXTREMITY: ICD-10-CM

## 2018-07-02 DIAGNOSIS — R68.89 IMPAIRED FUNCTION OF UPPER EXTREMITY: ICD-10-CM

## 2018-07-02 PROCEDURE — 97140 MANUAL THERAPY 1/> REGIONS: CPT | Performed by: PHYSICAL THERAPIST

## 2018-07-02 NOTE — PROGRESS NOTES
Patient called to ask if there was any assistance available to her for rent and utilities. I have referred her to Tia Biggs, Oncology Social Worker. Patient aware that Tia will call her.

## 2018-07-02 NOTE — TELEPHONE ENCOUNTER
ROSELINE called pt to provide support and resources.  Pt states that she has been off for a month and has exhausted her paid leave.  Pt is set to start receiving her short-term disability through her employer, which should pay her approximately 65% of her income.  Pt states that she will probably be off another couple of months due to her treatments.  ROSELINE discussed options for food, housing and utilities assistance and referred pt to [facility] for a one-time jake of $150.  SW referred pt to [employer] for help with her utilities.  ROSELINE mailed some further applications to pt along with contact information for future needs or concerns.  SW available for ongoing support and resource needs.

## 2018-07-02 NOTE — THERAPY TREATMENT NOTE
Outpatient Physical Therapy Lymphedema Treatment Note  [facility]     Patient Name: Tia Sommers  : 1970  MRN: [id number]  Today's Date: 2018        Visit Date: 2018    Visit Dx:    ICD-10-CM ICD-9-CM   1. 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 Lymphedema of right upper extremity I89.0 457.1   3. Scar conditions and fibrosis of skin L90.5 709.2   4. Mastodynia of left breast N64.4 611.71   5. Impaired function of upper extremity R68.89 V49.5       Patient Active Problem List   Diagnosis   • Breast cancer, left (CMS/HCC)              Lymphedema     Row Name 18 1415          Able to rate subjective pain? yes  -MW    Pre-Treatment Pain Level 2  -MW    Post-Treatment Pain Level 2  -MW    Subjective Pain Comment 2/10 at rest; bilat chest 6-8 with movement; LT >RT  -MW          Subjective Comments Lt sore from port; has to keep dressing on for 5 days. RUE looked good with compression on but didn't put it back on this morning.   -MW       Ptting Edema Category By severity  -MW    Pitting Edema Moderate;Mild   RUE moderate; Lt trunk mild; LUE very mild   -MW          Location/Assessment Upper Extremity;Upper Quadrant  -MW    Upper Extremity Conditions bilateral:;intact;clean;left:;other (comment)   LT multiple tatoos   -MW    Upper Extremity Color/Pigment bilateral:   WNL for ethnicity   -MW       Measurement Type(s) Quick Girth  -MW    Quick Girth Areas Upper extremities  -MW    Volumetric Areas --  -MW    Volumetric UE Distance interval (cm) --  -MW          Axilla 32.5 cm  -MW    Mid upper arm 32 cm  -MW    Elbow 27.7 cm  -MW    Mid forearm 26.5 cm  -MW    Wrist crease 15.8 cm  -MW    RUE Quick Girth Total 134.5  -MW       Manual Lymphatic Drainage initial sequence;opened regional lymph nodes;opened anastamoses;extremity treatment  -MW    Initial Sequence supraclavicular;shoulder collectors;abdomen  -MW    Supraclavicular right;left  -MW    Shoulder Collectors right;left   -MW    Abdomen superficial  -MW    Opened Regional Lymph Nodes inguinal  -MW    Inguinal right;left  -MW    Opened Anastamoses axillo-inguinal  -MW    Axillo-Inguinal right;left  -MW    Extremity Treatment MLD to full limb;simple/brief MLD  -MW    Simple/Brief MLD Lt lateral trunk and proximal UE   -MW    MLD to Full Limb Rt lateral trunk and RUE   -MW          Compression/Skin Care skin care;wrapping location;bandaging  -MW    Skin Care moisturizing lotion applied  -MW    Wrapping Location upper extremity  -MW    Wrapping Location UE right:;hand to axilla;left:;hand;forearm  -MW    Bandage Layers cotton elastic stocking- single layer (comment size);cotton elastic stocking- double layer (comment size)  -MW    Bandaging Comments RUE: compressogrip 3 to hand and forearm, size 4 to forearm and upperarm; both sizes doubled distally for gradient compression. LUE size 3 to hand and forearm with double layer proximally    -MW    Compression/Skin Care Comments Reinforced pt to wear compression daily, even to PT visits. Discussed compression sleeve use   -      User Key  (r) = Recorded By, (t) = Taken By, (c) = Cosigned By    Initials Name Provider Type    MW Jennifer Noriega, PT Physical Therapist                              PT Assessment/Plan     Row Name 07/02/18 8768          PT Assessment    Functional Limitations Performance in self-care ADL;Limitation in home management;Performance in work activities  -     Impairments Edema;Impaired lymphatic circulation;Impaired postural alignment;Muscle strength;Pain;Range of motion;Sensation  -     Assessment Comments Pt returns without compression in place but RUE softer. Reinforced need for daily compression to promote edema reduction and control. More limited by pain in pectoralis mm LT>RT which limited efforts for ROM/ stretching. Expect to initiate ASTYM next visit; monitor port site for healing, may need to utilize Aquaphor vs cocoa butter. Cont CDT-MLD. Pursue home  lymph pump options long term self management.   -MW     Rehab Potential Good  -MW     Patient/caregiver participated in establishment of treatment plan and goals Yes  -MW     Patient would benefit from skilled therapy intervention Yes  -MW        PT Plan    PT Frequency 2x/week  -MW     Physical Therapy Interventions (Optional Details) manual therapy techniques;manual lymphatic drainage;postural re-education;ROM (Range of Motion);stretching;strengthening;bandaging;home exercise program  -     PT Plan Comments Cont CDT-MLD; STM; initiate ASTYM prn; compression wrapping; progress HEP   -MW       User Key  (r) = Recorded By, (t) = Taken By, (c) = Cosigned By    Initials Name Provider Type    ALICIA Noriega, PT Physical Therapist                     Exercises     Row Name 07/02/18 1700 07/02/18 1415          Subjective Comments    Subjective Comments  -- Lt sore from port; has to keep dressing on for 5 days. RUE looked good with compression on but didn't put it back on this morning.   -MW        Subjective Pain    Able to rate subjective pain?  -- yes  -MW     Pre-Treatment Pain Level  -- 2  -MW     Post-Treatment Pain Level  -- 2  -MW     Subjective Pain Comment  -- 2/10 at rest; bilat chest 6-8 with movement; LT >RT  -MW        Total Minutes    31518 - PT Manual Therapy Minutes 53  -MW  --       User Key  (r) = Recorded By, (t) = Taken By, (c) = Cosigned By    Initials Name Provider Type    ALICIA Noriega, PT Physical Therapist                       Manual Rx (last 36 hours)      Manual Treatments     Row Name 07/02/18 1700             Total Minutes    26324 - PT Manual Therapy Minutes 53  -MW         Manual Rx 1    Manual Rx 1 Location Bilat chest / mastectomy sites   -MW      Manual Rx 1 Type STM with tissue bending at lateral pectoralis border  -MW      Manual Rx 1 Grade very gentle/ limited by discomfort   -MW      Manual Rx 1 Duration 10  -MW        User Key  (r) = Recorded By, (t) = Taken By, (c) =  Cosigned By    Initials Name Provider Type    MW Jennifer Noriega, PT Physical Therapist              Therapy Education  Education Details: Compression use daily; sleeve sizing for RUE. Cont to attempt LUE pendulums as port site heals/ pain decreases.   Given: Edema management, Symptoms/condition management, HEP, Pain management  Program: Reinforced  How Provided: Verbal  Provided to: Patient  Level of Understanding: Verbalized              Time Calculation:   Start Time: 1415  Total Timed Code Minutes- PT: 53 minute(s)   Therapy Suggested Charges     Code   Minutes Charges    00693 (CPT®) Hc Pt Neuromusc Re Education Ea 15 Min      21586 (CPT®) Hc Pt Ther Proc Ea 15 Min      77509 (CPT®) Hc Gait Training Ea 15 Min      18525 (CPT®) Hc Pt Therapeutic Act Ea 15 Min      87485 (CPT®) Hc Pt Manual Therapy Ea 15 Min 53 4    93719 (CPT®) Hc Pt Ther Massage- Per 15 Min      27775 (CPT®) Hc Pt Iontophoresis Ea 15 Min      35990 (CPT®) Hc Pt Elec Stim Ea-Per 15 Min      50685 (CPT®) Hc Pt Ultrasound Ea 15 Min      56849 (CPT®) Hc Pt Self Care/Mgmt/Train Ea 15 Min      Total  53 4        Therapy Charges for Today     Code Description Service Date Service Provider Modifiers Qty    34935283468 HC PT MANUAL THERAPY EA 15 MIN 7/2/2018 Jennifer Noriega, PT GP 4                    Jennifer Noriega, PT  7/2/2018

## 2018-07-06 ENCOUNTER — INFUSION (OUTPATIENT)
Dept: ONCOLOGY | Facility: HOSPITAL | Age: 48
End: 2018-07-06

## 2018-07-06 ENCOUNTER — EDUCATION (OUTPATIENT)
Dept: ONCOLOGY | Facility: HOSPITAL | Age: 48
End: 2018-07-06

## 2018-07-06 VITALS
HEART RATE: 84 BPM | SYSTOLIC BLOOD PRESSURE: 171 MMHG | TEMPERATURE: 98.6 F | HEIGHT: 65 IN | RESPIRATION RATE: 20 BRPM | WEIGHT: 161 LBS | DIASTOLIC BLOOD PRESSURE: 90 MMHG | BODY MASS INDEX: 26.82 KG/M2

## 2018-07-06 DIAGNOSIS — Z17.0 MALIGNANT NEOPLASM OF LEFT BREAST IN FEMALE, ESTROGEN RECEPTOR POSITIVE, UNSPECIFIED SITE OF BREAST (HCC): Primary | ICD-10-CM

## 2018-07-06 DIAGNOSIS — C50.912 MALIGNANT NEOPLASM OF LEFT BREAST IN FEMALE, ESTROGEN RECEPTOR POSITIVE, UNSPECIFIED SITE OF BREAST (HCC): Primary | ICD-10-CM

## 2018-07-06 LAB
ALBUMIN SERPL-MCNC: 3.87 G/DL (ref 3.2–4.8)
ALBUMIN/GLOB SERPL: 1.3 G/DL (ref 1.5–2.5)
ALP SERPL-CCNC: 74 U/L (ref 25–100)
ALT SERPL W P-5'-P-CCNC: 11 U/L (ref 7–40)
ANION GAP SERPL CALCULATED.3IONS-SCNC: 6 MMOL/L (ref 3–11)
AST SERPL-CCNC: 22 U/L (ref 0–33)
BILIRUB SERPL-MCNC: 0.4 MG/DL (ref 0.3–1.2)
BUN BLD-MCNC: 11 MG/DL (ref 9–23)
BUN/CREAT SERPL: 12.9 (ref 7–25)
CALCIUM SPEC-SCNC: 8.1 MG/DL (ref 8.7–10.4)
CHLORIDE SERPL-SCNC: 108 MMOL/L (ref 99–109)
CO2 SERPL-SCNC: 26 MMOL/L (ref 20–31)
CREAT BLD-MCNC: 0.85 MG/DL (ref 0.6–1.3)
ERYTHROCYTE [DISTWIDTH] IN BLOOD BY AUTOMATED COUNT: 14 % (ref 11.3–14.5)
GFR SERPL CREATININE-BSD FRML MDRD: 71 ML/MIN/1.73
GFR SERPL CREATININE-BSD FRML MDRD: 86 ML/MIN/1.73
GLOBULIN UR ELPH-MCNC: 3 GM/DL
GLUCOSE BLD-MCNC: 87 MG/DL (ref 70–100)
HCT VFR BLD AUTO: 38.4 % (ref 34.5–44)
HGB BLD-MCNC: 11.8 G/DL (ref 11.5–15.5)
LYMPHOCYTES # BLD AUTO: 2.3 10*3/MM3 (ref 0.6–4.8)
LYMPHOCYTES NFR BLD AUTO: 52.3 % (ref 24–44)
MCH RBC QN AUTO: 26.9 PG (ref 27–31)
MCHC RBC AUTO-ENTMCNC: 30.8 G/DL (ref 32–36)
MCV RBC AUTO: 87.2 FL (ref 80–99)
MONOCYTES # BLD AUTO: 0.4 10*3/MM3 (ref 0–1)
MONOCYTES NFR BLD AUTO: 8.4 % (ref 0–12)
NEUTROPHILS # BLD AUTO: 1.7 10*3/MM3 (ref 1.5–8.3)
NEUTROPHILS NFR BLD AUTO: 39.3 % (ref 41–71)
PLATELET # BLD AUTO: 216 10*3/MM3 (ref 150–450)
PMV BLD AUTO: 8 FL (ref 6–12)
POTASSIUM BLD-SCNC: 3.7 MMOL/L (ref 3.5–5.5)
PROT SERPL-MCNC: 6.9 G/DL (ref 5.7–8.2)
RBC # BLD AUTO: 4.41 10*6/MM3 (ref 3.89–5.14)
SODIUM BLD-SCNC: 140 MMOL/L (ref 132–146)
WBC NRBC COR # BLD: 4.4 10*3/MM3 (ref 3.5–10.8)

## 2018-07-06 PROCEDURE — 25010000002 DOCETAXEL 20 MG/ML SOLUTION 1 ML VIAL: Performed by: INTERNAL MEDICINE

## 2018-07-06 PROCEDURE — 85025 COMPLETE CBC W/AUTO DIFF WBC: CPT | Performed by: INTERNAL MEDICINE

## 2018-07-06 PROCEDURE — 25010000002 CYCLOPHOSPHAMIDE PER 100 MG: Performed by: INTERNAL MEDICINE

## 2018-07-06 PROCEDURE — 96413 CHEMO IV INFUSION 1 HR: CPT

## 2018-07-06 PROCEDURE — 80053 COMPREHEN METABOLIC PANEL: CPT | Performed by: INTERNAL MEDICINE

## 2018-07-06 PROCEDURE — 25010000002 PALONOSETRON PER 25 MCG: Performed by: INTERNAL MEDICINE

## 2018-07-06 PROCEDURE — 96375 TX/PRO/DX INJ NEW DRUG ADDON: CPT

## 2018-07-06 PROCEDURE — 25010000002 DOCETAXEL 20 MG/ML SOLUTION 4 ML VIAL: Performed by: INTERNAL MEDICINE

## 2018-07-06 PROCEDURE — 96417 CHEMO IV INFUS EACH ADDL SEQ: CPT

## 2018-07-06 PROCEDURE — 25010000002 HEPARIN FLUSH (PORCINE) 100 UNIT/ML SOLUTION: Performed by: INTERNAL MEDICINE

## 2018-07-06 PROCEDURE — 96411 CHEMO IV PUSH ADDL DRUG: CPT

## 2018-07-06 RX ORDER — SODIUM CHLORIDE 0.9 % (FLUSH) 0.9 %
10 SYRINGE (ML) INJECTION AS NEEDED
Status: CANCELLED | OUTPATIENT
Start: 2018-07-06

## 2018-07-06 RX ORDER — SODIUM CHLORIDE 9 MG/ML
250 INJECTION, SOLUTION INTRAVENOUS ONCE
Status: COMPLETED | OUTPATIENT
Start: 2018-07-06 | End: 2018-07-06

## 2018-07-06 RX ORDER — PALONOSETRON 0.05 MG/ML
0.25 INJECTION, SOLUTION INTRAVENOUS ONCE
Status: COMPLETED | OUTPATIENT
Start: 2018-07-06 | End: 2018-07-06

## 2018-07-06 RX ORDER — SODIUM CHLORIDE 0.9 % (FLUSH) 0.9 %
10 SYRINGE (ML) INJECTION AS NEEDED
Status: DISCONTINUED | OUTPATIENT
Start: 2018-07-06 | End: 2018-07-06 | Stop reason: HOSPADM

## 2018-07-06 RX ADMIN — SODIUM CHLORIDE 500 ML: 9 INJECTION, SOLUTION INTRAVENOUS at 11:20

## 2018-07-06 RX ADMIN — PALONOSETRON HYDROCHLORIDE 0.25 MG: 0.25 INJECTION INTRAVENOUS at 11:20

## 2018-07-06 RX ADMIN — HEPARIN 500 UNITS: 100 SYRINGE at 13:29

## 2018-07-06 RX ADMIN — Medication 10 ML: at 13:29

## 2018-07-06 RX ADMIN — CYCLOPHOSPHAMIDE 1090 MG: 1 INJECTION, POWDER, FOR SOLUTION INTRAVENOUS; ORAL at 12:52

## 2018-07-06 RX ADMIN — DOCETAXEL 135 MG: 20 INJECTION, SOLUTION, CONCENTRATE INTRAVENOUS at 11:46

## 2018-07-06 NOTE — PLAN OF CARE
Outpatient Infusion • [address] • [address] • [place] [id number] • [phone]      CHEMOTHERAPY EDUCATION SHEET    NAME:  Tia Sommers      : 1970           DATE: 18    Booklets Given: Chemotherapy and You []  Eating Hints []    Sexuality/Fertility Books []     Chemotherapy/Biotherapy Education Sheets: (list all that apply)                                                                                                                                                                Chemotherapy Regimen: docetaxel + cyclophosphamide every 21 days     TOPICS EDUCATION PROVIDED EDUCATION REINFORCED COMMENTS   ANEMIA:  role of RBC, cause, s/s, ways to manage, role of transfusion [x] [] Discussed the role and function of RBCs and that the patient may feel fatigued. Recommended to remain active during treatment.   THROMBOCYTOPENIA:  role of platelet, cause, s/s, ways to prevent bleeding, things to avoid, when to seek help [x] [] Discussed the role and function of platelets, and that the patient may be at an increased risk of bleeding.   NEUTROPENIA:  role of WBC, cause, infection precautions, s/s of infection, when to call MD [x] [] Discussed the role and function of WBCs, and that the patient may be at an increased risk of infection. Recommended to call MD if temperature greater than 100.4.   NUTRITION & APPETITE CHANGES:  importance of maintaining healthy diet & weight, ways to manage to improve intake, dietary consult, exercise regimen [x] [] Discussed that patient may develop a metallic taste, and to switch to plastic utensils if this occurs. Also discussed that water may taste differently, recommended the use of flavor additives. Counseled on the importance of hydration, and recommended 8-10 glasses of water daily.   DIARRHEA:  causes, s/s of dehydration, ways to manage, dietary changes, when to call MD [x] [] Counseled on the use of loperamide, and recommended to contact MD if  diarrhea persists despite use of anti-diarrheals.   CONSTIPATION:  causes, ways to manage, dietary changes, when to call MD [x] [] Discussed that constipation may occur, recommended docusate and/or Miralax.    NAUSEA & VOMITING:  cause, use of antiemetics, dietary changes, when to call MD [x] [] Discussed pre-med, home use of ondansetron, and when to call the MD.   MOUTH SORES:  causes, oral care, ways to manage [x] [] Discussed the possibility of mouth sores, recommended baking soda, salt, and water mouth wash. Advised to contact MD if mouth sores develop.   ALOPECIA:  cause, ways to manage, resources [x] [] Discussed the possibility of hair loss and the possibility of a prescription for a wig.   INFERTILITY & SEXUALITY:  causes, fertility preservation options, sexuality changes, ways to manage, importance of birth control [x] [] Recommended the use of proper contraception for 48hrs after chemotherapy.    NERVOUS SYSTEM CHANGES:  causes, s/s, neuropathies, cognitive changes, ways to manage [x] [] Discussed potential for peripheral neuropathy, advised to contact MD if it develops.   PAIN:  causes, ways to manage [] [] ????   SKIN & NAIL CHANGES:  cause, s/s, ways to manage [x] [] Discussed that the patient is at an increased risk of sunburn, counseled on the appropriate use of sunscreen.   ORGAN TOXICITIES:  cause, s/s, need for diagnostic tests, labs, when to notify MD [x] [] Discussed monitoring of liver and kidney function, as well as blood counts.   SURVIVORSHIP:  distress, distress assessment, secondary malignancies, early/late effects, follow-up, social issues, social support [] []    HOME CARE:  use of spill kits, storing of PO chemo, how to manage bodily fluids [x] [] Discussed proper laundering of soiled linens and clothing, as well as cleaning bodily fluids.   MISCELLANEOUS:  drug interactions, administration, vesicant, et [x] [] Discussed length of infusion, frequency of treatments, function of  dexamethasone as a home medication, and the importance of hydration with cyclophosphamide as well as signs and symptoms of hemorrhagic cystitis.     Referrals:    Notes: Gave Daphne's business card and advised to contact with any questions.

## 2018-07-09 ENCOUNTER — HOSPITAL ENCOUNTER (OUTPATIENT)
Dept: PHYSICAL THERAPY | Facility: HOSPITAL | Age: 48
Setting detail: THERAPIES SERIES
Discharge: HOME | End: 2018-07-09
Attending: SURGERY

## 2018-07-09 DIAGNOSIS — L90.5 SCAR CONDITIONS AND FIBROSIS OF SKIN: ICD-10-CM

## 2018-07-09 DIAGNOSIS — I97.2 POST-MASTECTOMY LYMPHEDEMA SYNDROME: Primary | ICD-10-CM

## 2018-07-09 DIAGNOSIS — I89.0 LYMPHEDEMA OF RIGHT UPPER EXTREMITY: ICD-10-CM

## 2018-07-09 DIAGNOSIS — N64.4 MASTODYNIA OF LEFT BREAST: ICD-10-CM

## 2018-07-09 PROCEDURE — 97140 MANUAL THERAPY 1/> REGIONS: CPT | Performed by: PHYSICAL THERAPIST

## 2018-07-09 NOTE — THERAPY TREATMENT NOTE
Outpatient Physical Therapy Lymphedema Treatment Note  [facility]     Patient Name: Tia Sommers  : 1970  MRN: [id number]  Today's Date: 2018        Visit Date: 2018    Visit Dx:    ICD-10-CM ICD-9-CM   1. 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 Lymphedema of right upper extremity I89.0 457.1   3. Scar conditions and fibrosis of skin L90.5 709.2   4. Mastodynia of left breast N64.4 611.71       Patient Active Problem List   Diagnosis   • Breast cancer, left (CMS/HCC)              Lymphedema     Row Name 18 1015          Able to rate subjective pain? yes  -MW    Pre-Treatment Pain Level 2  -MW    Post-Treatment Pain Level 2  -MW          Subjective Comments Feeling better; less sore at port and left chest but . Rough weekend after Friday chemo.   -MW          Ptting Edema Category By severity  -MW    Pitting Edema Moderate;Mild   RUE moderate; Lt trunk mild; LUE very mild   -MW    Edema Assessment Comment RUE mild in forearm and mild to moderate in upperarm; Lt and Rt trunk mild; LUE very mild   -MW          Location/Assessment Upper Extremity;Upper Quadrant  -MW    Upper Extremity Conditions bilateral:;intact;clean;left:;other (comment)   LT multiple tatoos   -MW    Upper Extremity Color/Pigment bilateral:   WNL for ethnicity   -MW    Upper Quadrant Conditions bilateral:;intact;scab(s)   scattered scabs across bilateral incision lines   -MW    Upper Quadrant Color/Pigment bilateral:;other (comment)   WNL   -MW    Skin Observations Comment Bandaid over port site; mild bruising noted at port site (greenish coloration).   -MW          Measurement Type(s) Quick Girth  -MW    Quick Girth Areas Upper extremities  -MW          Axilla 31.5 cm  -MW    Mid upper arm 32.8 cm  -MW    Elbow 27.2 cm  -MW    Mid forearm 24.7 cm  -MW    Wrist crease 15.4 cm  -MW    RUE Quick Girth Total 131.6  -MW          Manual Lymphatic Drainage initial sequence;opened regional lymph  nodes;opened anastamoses;extremity treatment;astym  -MW    Initial Sequence supraclavicular;shoulder collectors;abdomen  -MW    Supraclavicular right;left  -MW    Shoulder Collectors right;left  -MW    Abdomen superficial  -MW    Opened Regional Lymph Nodes inguinal  -MW    Inguinal right;left  -MW    Opened Anastamoses axillo-inguinal  -MW    Axillo-Inguinal right;left  -MW    Extremity Treatment MLD to full limb;simple/brief MLD  -MW    Simple/Brief MLD LUE   -MW    MLD to Full Limb RUE   -MW    Astym mastectomy protocol  -MW    Mastectomy Protocol supine  -MW    Mastectomy Protocol Comment Initiated ASTYM this visit. Evaluator and localizer to Rt and Lt chest, working around port on Lt. Very gentle strokes on Lt due to tenderness. Mild to moderate soft tissue disruptions Rt > Lt chest. Continued ASTYM into lateral trunk / ribs with all 3 tools; mild soft tissue disruptions bilaterally.   -MW    Manual Lymphatic Drainage Comments Focused MLD to Rt and Lt lateral trunk / fullness in lateral trunk and axilla   -MW          Compression/Skin Care skin care;wrapping location;bandaging  -MW    Skin Care moisturizing lotion applied  -MW    Wrapping Location upper extremity  -MW    Wrapping Location UE right:;hand to axilla;left:;hand;forearm  -MW    Bandage Layers cotton elastic stocking- single layer (comment size);cotton elastic stocking- double layer (comment size)  -MW    Bandaging Comments RUE: compressogrip 3 to hand and forearm, size 4 to forearm and upperarm; both sizes doubled distally for gradient compression. LUE size 3 to hand and forearm with double layer proximally    -MW    Compression/Skin Care Comments Encouraged pt to follow up with [place] [facility] for arm sleeve.   -MW      User Key  (r) = Recorded By, (t) = Taken By, (c) = Cosigned By    Initials Name Provider Type    MW Jennifer Noriega, PT Physical Therapist                              PT Assessment/Plan     Row Name 07/09/18 1015          PT  Assessment    Functional Limitations Performance in self-care ADL;Limitation in home management;Performance in work activities  -MW     Impairments Edema;Impaired lymphatic circulation;Impaired postural alignment;Muscle strength;Pain;Range of motion;Sensation  -MW     Assessment Comments Pt returns with compressogrips in place; RUE with improved edema reduction especially in forearm and elbow. Initiated ASTYM to work on soft tissue restrictions across surgical area, but did not work directly on incision lines as some scabs remain in place. Mild to moderate soft tissue disruptions noted more on RT than Lt which is consistent with h/o XRT to Rt chest in 2000. RUE ready for new arm sleeve, but donning and doffing may be challenging with limited LUE function due to pain at port and in shoulder/ chest.   -MW     Rehab Potential Good  -MW     Patient/caregiver participated in establishment of treatment plan and goals Yes  -MW     Patient would benefit from skilled therapy intervention Yes  -MW        PT Plan    PT Frequency 2x/week  -MW     Physical Therapy Interventions (Optional Details) manual therapy techniques;manual lymphatic drainage;postural re-education;ROM (Range of Motion);stretching;strengthening;bandaging;home exercise program  -MW     PT Plan Comments Cont CDT-MLD; STM; initiate ASTYM prn; compression wrapping; progress HEP   -MW       User Key  (r) = Recorded By, (t) = Taken By, (c) = Cosigned By    Initials Name Provider Type    ALICIA Noriega, PT Physical Therapist                     Exercises     Row Name 07/09/18 1015             Subjective Comments    Subjective Comments Feeling better; less sore at port and left chest but . Rough weekend after Friday chemo.   -MW         Subjective Pain    Able to rate subjective pain? yes  -MW      Pre-Treatment Pain Level 2  -MW      Post-Treatment Pain Level 2  -MW         Total Minutes    38999 - PT Manual Therapy Minutes 55  -MW        User Key   (r) = Recorded By, (t) = Taken By, (c) = Cosigned By    Initials Name Provider Type    MW Jennifer Noriega PT Physical Therapist                       Manual Rx (last 36 hours)      Manual Treatments     Row Name 07/09/18 1015             Total Minutes    47939 - PT Manual Therapy Minutes 55  -MW         Manual Rx 1    Manual Rx 1 Location Bilat chest / mastectomy sites   -MW      Manual Rx 1 Type STM with tissue bending at lateral pectoralis border  -MW      Manual Rx 1 Grade very gentle/ limited by discomfort   -MW      Manual Rx 1 Duration 15  -MW        User Key  (r) = Recorded By, (t) = Taken By, (c) = Cosigned By    Initials Name Provider Type    MW Jennifer Noriega PT Physical Therapist              Therapy Education  Education Details: Benefits and risks of ASTYM; encouraged pt to drink extra water and continue gentle stretching. Added AAROM bilateral shoulder flexion in supine to HEP.   Given: Edema management, Symptoms/condition management, HEP, Pain management  Program: Progressed  How Provided: Verbal, Demonstration  Provided to: Patient  Level of Understanding: Verbalized, Demonstrated              Time Calculation:   Start Time: 1015  Total Timed Code Minutes- PT: 55 minute(s)   Therapy Suggested Charges     Code   Minutes Charges    63777 (CPT®) Hc Pt Neuromusc Re Education Ea 15 Min      28035 (CPT®) Hc Pt Ther Proc Ea 15 Min      51367 (CPT®) Hc Gait Training Ea 15 Min      57216 (CPT®) Hc Pt Therapeutic Act Ea 15 Min      23690 (CPT®) Hc Pt Manual Therapy Ea 15 Min 55 4    42995 (CPT®) Hc Pt Ther Massage- Per 15 Min      54655 (CPT®) Hc Pt Iontophoresis Ea 15 Min      84952 (CPT®) Hc Pt Elec Stim Ea-Per 15 Min      98105 (CPT®) Hc Pt Ultrasound Ea 15 Min      69402 (CPT®) Hc Pt Self Care/Mgmt/Train Ea 15 Min      Total  55 4        Therapy Charges for Today     Code Description Service Date Service Provider Modifiers Qty    93424899665 HC PT MANUAL THERAPY EA 15 MIN 7/9/2018 Jennifer Noriega PT  GP 4                    Jennifer Noriega, PT  7/9/2018

## 2018-07-11 ENCOUNTER — HOSPITAL ENCOUNTER (EMERGENCY)
Facility: HOSPITAL | Age: 48
Discharge: HOME | End: 2018-07-11
Attending: EMERGENCY MEDICINE | Admitting: EMERGENCY MEDICINE

## 2018-07-11 ENCOUNTER — APPOINTMENT (OUTPATIENT)
Dept: CT IMAGING | Facility: HOSPITAL | Age: 48
End: 2018-07-11

## 2018-07-11 ENCOUNTER — APPOINTMENT (OUTPATIENT)
Dept: GENERAL RADIOLOGY | Facility: HOSPITAL | Age: 48
End: 2018-07-11

## 2018-07-11 VITALS
HEIGHT: 66 IN | OXYGEN SATURATION: 94 % | BODY MASS INDEX: 25.71 KG/M2 | RESPIRATION RATE: 18 BRPM | DIASTOLIC BLOOD PRESSURE: 57 MMHG | HEART RATE: 74 BPM | SYSTOLIC BLOOD PRESSURE: 115 MMHG | WEIGHT: 160 LBS | TEMPERATURE: 98.3 F

## 2018-07-11 DIAGNOSIS — R50.9 FEVER, UNSPECIFIED FEVER CAUSE: ICD-10-CM

## 2018-07-11 DIAGNOSIS — A60.00 GENITAL HERPES SIMPLEX, UNSPECIFIED SITE: Primary | ICD-10-CM

## 2018-07-11 DIAGNOSIS — R10.30 LOWER ABDOMINAL PAIN: ICD-10-CM

## 2018-07-11 LAB
ALBUMIN SERPL-MCNC: 3.83 G/DL (ref 3.2–4.8)
ALBUMIN/GLOB SERPL: 1.2 G/DL (ref 1.5–2.5)
ALP SERPL-CCNC: 76 U/L (ref 25–100)
ALT SERPL W P-5'-P-CCNC: 57 U/L (ref 7–40)
ANION GAP SERPL CALCULATED.3IONS-SCNC: 4 MMOL/L (ref 3–11)
AST SERPL-CCNC: 62 U/L (ref 0–33)
BACTERIA UR QL AUTO: ABNORMAL /HPF
BASOPHILS # BLD AUTO: 0.03 10*3/MM3 (ref 0–0.2)
BASOPHILS NFR BLD AUTO: 1.2 % (ref 0–1)
BILIRUB SERPL-MCNC: 0.9 MG/DL (ref 0.3–1.2)
BILIRUB UR QL STRIP: NEGATIVE
BUN BLD-MCNC: 9 MG/DL (ref 9–23)
BUN/CREAT SERPL: 14.1 (ref 7–25)
CALCIUM SPEC-SCNC: 8.1 MG/DL (ref 8.7–10.4)
CHLORIDE SERPL-SCNC: 107 MMOL/L (ref 99–109)
CLARITY UR: CLEAR
CO2 SERPL-SCNC: 27 MMOL/L (ref 20–31)
COLOR UR: YELLOW
CREAT BLD-MCNC: 0.64 MG/DL (ref 0.6–1.3)
D-LACTATE SERPL-SCNC: 0.6 MMOL/L (ref 0.5–2)
DEPRECATED RDW RBC AUTO: 41.2 FL (ref 37–54)
EOSINOPHIL # BLD AUTO: 0.03 10*3/MM3 (ref 0–0.3)
EOSINOPHIL NFR BLD AUTO: 1.2 % (ref 0–3)
ERYTHROCYTE [DISTWIDTH] IN BLOOD BY AUTOMATED COUNT: 13.6 % (ref 11.3–14.5)
GFR SERPL CREATININE-BSD FRML MDRD: 119 ML/MIN/1.73
GFR SERPL CREATININE-BSD FRML MDRD: 98 ML/MIN/1.73
GLOBULIN UR ELPH-MCNC: 3.1 GM/DL
GLUCOSE BLD-MCNC: 110 MG/DL (ref 70–100)
GLUCOSE UR STRIP-MCNC: NEGATIVE MG/DL
HCT VFR BLD AUTO: 35.3 % (ref 34.5–44)
HGB BLD-MCNC: 11.7 G/DL (ref 11.5–15.5)
HGB UR QL STRIP.AUTO: NEGATIVE
HYALINE CASTS UR QL AUTO: ABNORMAL /LPF
IMM GRANULOCYTES # BLD: 0.02 10*3/MM3 (ref 0–0.03)
IMM GRANULOCYTES NFR BLD: 0.8 % (ref 0–0.6)
KETONES UR QL STRIP: NEGATIVE
LEUKOCYTE ESTERASE UR QL STRIP.AUTO: ABNORMAL
LIPASE SERPL-CCNC: 19 U/L (ref 6–51)
LYMPHOCYTES # BLD AUTO: 1.18 10*3/MM3 (ref 0.6–4.8)
LYMPHOCYTES NFR BLD AUTO: 48.8 % (ref 24–44)
MCH RBC QN AUTO: 27.8 PG (ref 27–31)
MCHC RBC AUTO-ENTMCNC: 33.1 G/DL (ref 32–36)
MCV RBC AUTO: 83.8 FL (ref 80–99)
MONOCYTES # BLD AUTO: 0.07 10*3/MM3 (ref 0–1)
MONOCYTES NFR BLD AUTO: 2.9 % (ref 0–12)
NEUTROPHILS # BLD AUTO: 1.11 10*3/MM3 (ref 1.5–8.3)
NEUTROPHILS NFR BLD AUTO: 45.9 % (ref 41–71)
NITRITE UR QL STRIP: NEGATIVE
PH UR STRIP.AUTO: 6 [PH] (ref 5–8)
PLATELET # BLD AUTO: 131 10*3/MM3 (ref 150–450)
PMV BLD AUTO: 11.5 FL (ref 6–12)
POTASSIUM BLD-SCNC: 3.7 MMOL/L (ref 3.5–5.5)
PROCALCITONIN SERPL-MCNC: <0.05 NG/ML
PROT SERPL-MCNC: 6.9 G/DL (ref 5.7–8.2)
PROT UR QL STRIP: NEGATIVE
RBC # BLD AUTO: 4.21 10*6/MM3 (ref 3.89–5.14)
RBC # UR: ABNORMAL /HPF
REF LAB TEST METHOD: ABNORMAL
SODIUM BLD-SCNC: 138 MMOL/L (ref 132–146)
SP GR UR STRIP: 1.03 (ref 1–1.03)
SQUAMOUS #/AREA URNS HPF: ABNORMAL /HPF
TROPONIN I SERPL-MCNC: 0 NG/ML (ref 0–0.07)
UROBILINOGEN UR QL STRIP: ABNORMAL
WBC NRBC COR # BLD: 2.42 10*3/MM3 (ref 3.5–10.8)
WBC UR QL AUTO: ABNORMAL /HPF

## 2018-07-11 PROCEDURE — 84484 ASSAY OF TROPONIN QUANT: CPT

## 2018-07-11 PROCEDURE — 96361 HYDRATE IV INFUSION ADD-ON: CPT

## 2018-07-11 PROCEDURE — 25010000002 HEPARIN FLUSH (PORCINE) 100 UNIT/ML SOLUTION: Performed by: EMERGENCY MEDICINE

## 2018-07-11 PROCEDURE — 85025 COMPLETE CBC W/AUTO DIFF WBC: CPT | Performed by: EMERGENCY MEDICINE

## 2018-07-11 PROCEDURE — 96375 TX/PRO/DX INJ NEW DRUG ADDON: CPT

## 2018-07-11 PROCEDURE — 93005 ELECTROCARDIOGRAM TRACING: CPT | Performed by: EMERGENCY MEDICINE

## 2018-07-11 PROCEDURE — 80053 COMPREHEN METABOLIC PANEL: CPT | Performed by: EMERGENCY MEDICINE

## 2018-07-11 PROCEDURE — P9612 CATHETERIZE FOR URINE SPEC: HCPCS

## 2018-07-11 PROCEDURE — 87040 BLOOD CULTURE FOR BACTERIA: CPT | Performed by: EMERGENCY MEDICINE

## 2018-07-11 PROCEDURE — 83605 ASSAY OF LACTIC ACID: CPT | Performed by: EMERGENCY MEDICINE

## 2018-07-11 PROCEDURE — 74176 CT ABD & PELVIS W/O CONTRAST: CPT

## 2018-07-11 PROCEDURE — 25010000002 ONDANSETRON PER 1 MG: Performed by: EMERGENCY MEDICINE

## 2018-07-11 PROCEDURE — 25010000002 MORPHINE PER 10 MG: Performed by: EMERGENCY MEDICINE

## 2018-07-11 PROCEDURE — 96374 THER/PROPH/DIAG INJ IV PUSH: CPT

## 2018-07-11 PROCEDURE — 81001 URINALYSIS AUTO W/SCOPE: CPT | Performed by: EMERGENCY MEDICINE

## 2018-07-11 PROCEDURE — 99284 EMERGENCY DEPT VISIT MOD MDM: CPT

## 2018-07-11 PROCEDURE — 83690 ASSAY OF LIPASE: CPT | Performed by: EMERGENCY MEDICINE

## 2018-07-11 PROCEDURE — 71045 X-RAY EXAM CHEST 1 VIEW: CPT

## 2018-07-11 PROCEDURE — 84145 PROCALCITONIN (PCT): CPT | Performed by: EMERGENCY MEDICINE

## 2018-07-11 RX ORDER — ONDANSETRON 4 MG/1
4 TABLET, ORALLY DISINTEGRATING ORAL EVERY 6 HOURS PRN
Qty: 20 TABLET | Refills: 0 | Status: SHIPPED | OUTPATIENT
Start: 2018-07-11 | End: 2018-07-16

## 2018-07-11 RX ORDER — ONDANSETRON 2 MG/ML
4 INJECTION INTRAMUSCULAR; INTRAVENOUS ONCE
Status: COMPLETED | OUTPATIENT
Start: 2018-07-11 | End: 2018-07-11

## 2018-07-11 RX ORDER — VALACYCLOVIR HYDROCHLORIDE 1 G/1
1000 TABLET, FILM COATED ORAL 2 TIMES DAILY
Qty: 20 TABLET | Refills: 0 | Status: SHIPPED | OUTPATIENT
Start: 2018-07-11 | End: 2018-07-21

## 2018-07-11 RX ORDER — SODIUM CHLORIDE 0.9 % (FLUSH) 0.9 %
10 SYRINGE (ML) INJECTION AS NEEDED
Status: DISCONTINUED | OUTPATIENT
Start: 2018-07-11 | End: 2018-07-11 | Stop reason: HOSPADM

## 2018-07-11 RX ORDER — MORPHINE SULFATE 4 MG/ML
4 INJECTION, SOLUTION INTRAMUSCULAR; INTRAVENOUS ONCE
Status: COMPLETED | OUTPATIENT
Start: 2018-07-11 | End: 2018-07-11

## 2018-07-11 RX ORDER — ACETAMINOPHEN 500 MG
1000 TABLET ORAL ONCE
Status: COMPLETED | OUTPATIENT
Start: 2018-07-11 | End: 2018-07-11

## 2018-07-11 RX ORDER — HYDROCODONE BITARTRATE AND ACETAMINOPHEN 5; 325 MG/1; MG/1
1 TABLET ORAL EVERY 6 HOURS PRN
Qty: 12 TABLET | Refills: 0 | Status: SHIPPED | OUTPATIENT
Start: 2018-07-11

## 2018-07-11 RX ORDER — LIDOCAINE 40 MG/G
1 CREAM TOPICAL ONCE
Status: COMPLETED | OUTPATIENT
Start: 2018-07-11 | End: 2018-07-11

## 2018-07-11 RX ADMIN — LIDOCAINE 4%: 4 CREAM TOPICAL at 02:16

## 2018-07-11 RX ADMIN — ACETAMINOPHEN 1000 MG: 500 TABLET, FILM COATED ORAL at 02:16

## 2018-07-11 RX ADMIN — HEPARIN 300 UNITS: 100 SYRINGE at 05:23

## 2018-07-11 RX ADMIN — ONDANSETRON 4 MG: 2 INJECTION, SOLUTION INTRAMUSCULAR; INTRAVENOUS at 03:04

## 2018-07-11 RX ADMIN — MORPHINE SULFATE 4 MG: 4 INJECTION, SOLUTION INTRAMUSCULAR; INTRAVENOUS at 03:07

## 2018-07-11 RX ADMIN — SODIUM CHLORIDE 1000 ML: 9 INJECTION, SOLUTION INTRAVENOUS at 03:12

## 2018-07-11 NOTE — DISCHARGE INSTRUCTIONS
Patient will be advised to take fell acyclovir as prescribed.    Alternate Tylenol and ibuprofen for fever control.    Take Lortab as needed for more severe pain.    Return to the ER if patient develops higher fever, more severe symptoms, or more concern.

## 2018-07-11 NOTE — ED PROVIDER NOTES
Subjective   Tia Sommers is a 48 y.o. female who presents to the ED with c/o abdominal pain with onset 3 days ago. The patient localizes her pain primarily within the bilateral lower quadrants and to a lesser extent within the epigastric region. She notes that the discomfort is intermittent and sharp in nature but denies any radiation or migration of her pain.     The patient also complains of nausea, dysuria, and increased urinary frequency/urgency but denies vomiting, diarrhea, cough, shortness of breath, rhinorrhea, congestion, sore throat, fever, chills, myalgias, or any other acute complaints at this time. Additionally the patient denies PMHx of coagulation complications but does note PSHx of laparoscopy.     It is noted that the patient had a double mastectomy performed on 05/16/18 to aid in her delcid against breast CA. Since this time the patient states she has recovered well and denies experiencing any pain along or near her surgical incisions. Mrs. Sommers is still receiving chemo therapy at this time.         History provided by:  Patient  Abdominal Pain   Pain location:  Epigastric, LLQ and RLQ  Pain quality: sharp    Pain radiates to:  Does not radiate  Pain severity:  Moderate  Onset quality:  Sudden  Duration:  3 days  Timing:  Constant  Chronicity:  New  Relieved by:  None tried  Ineffective treatments:  None tried  Associated symptoms: dysuria and nausea    Associated symptoms: no chills, no cough, no diarrhea, no fever, no shortness of breath, no sore throat and no vomiting        Review of Systems   Constitutional: Negative for chills and fever.   HENT: Negative for congestion, rhinorrhea and sore throat.    Respiratory: Negative for cough and shortness of breath.    Gastrointestinal: Positive for abdominal pain and nausea. Negative for diarrhea and vomiting.   Genitourinary: Positive for dysuria, frequency and urgency.   Musculoskeletal: Negative for myalgias.   All other systems reviewed  and are negative.      Past Medical History:   Diagnosis Date   • Arthritis     minna knees and hands   • Breast cancer (CMS/HCC) 2000    dx in 1998;  2018   • Drug therapy 2000   • Hx of radiation therapy 2000   • Lupus 2014   • Lymphedema     right arm       Allergies   Allergen Reactions   • Iodine Anaphylaxis   • Penicillins Anaphylaxis   • Latex Rash       Past Surgical History:   Procedure Laterality Date   • BONE BIOPSY      back   • BREAST BIOPSY Right    • BREAST LUMPECTOMY Right 2000   • COLONOSCOPY      > 10 years   • DIAGNOSTIC LAPAROSCOPY EXPLORATORY LAPAROTOMY     • ENDOSCOPY     • MASTECTOMY W/ SENTINEL NODE BIOPSY Bilateral 5/16/2018    Procedure: BREAST MASTECTOMY BILATERAL WITH LEFT SENTINEL NODE BIOPSY;  Surgeon: Keven Dye MD;  Location: [facility];  Service: General   • SPLENECTOMY     • VENOUS ACCESS DEVICE (PORT) INSERTION     • VENOUS ACCESS DEVICE (PORT) REMOVAL  2003       Family History   Problem Relation Age of Onset   • Breast cancer Neg Hx    • Endometrial cancer Neg Hx    • Ovarian cancer Neg Hx        Social History     Social History   • Marital status:      Social History Main Topics   • Smoking status: Current Every Day Smoker     Packs/day: 0.25     Types: Cigarettes     Start date: 5/15/1988   • Smokeless tobacco: Never Used      Comment: one cigarette per day   • Alcohol use 0.6 oz/week     1 Glasses of wine per week      Comment: social   • Drug use: No   • Sexual activity: Defer     Other Topics Concern   • Not on file         Objective   Physical Exam   Constitutional: She is oriented to person, place, and time. She appears well-developed and well-nourished. No distress.   Patient's mental status is intact but she appears uncomfortable and in pain.    HENT:   Head: Normocephalic.   Nose: Nose normal.   Mouth/Throat: Oropharynx is clear and moist.   Eyes: Conjunctivae and EOM are normal. Pupils are equal, round, and reactive to light. No scleral icterus.   Neck:  Normal range of motion. Neck supple.   Cardiovascular: Regular rhythm and normal heart sounds.  Tachycardia present.  Exam reveals no gallop and no friction rub.    No murmur heard.  Pulmonary/Chest: Effort normal and breath sounds normal. No respiratory distress. She has no wheezes. She has no rhonchi. She has no rales.   Abdominal: Soft. Bowel sounds are normal. She exhibits distension (Mildly distended.). There is tenderness (Moderate to severe TTP in the LUQ. Severe TTP in the bilateral lower quadrants and epigastric region.).   Musculoskeletal: Normal range of motion. She exhibits no edema, tenderness or deformity.   LE exam normal.   Neurological: She is alert and oriented to person, place, and time.   Skin: Skin is warm and dry.   On chest wall exam, previous surgical wounds are clean, dry, and intact. No signs of drainage and no pain reported to those areas.    Psychiatric: She has a normal mood and affect. Her behavior is normal.   Nursing note and vitals reviewed.      Procedures         ED Course         Recent Results (from the past 24 hour(s))   Comprehensive Metabolic Panel    Collection Time: 07/11/18  3:20 AM   Result Value Ref Range    Glucose 110 (H) 70 - 100 mg/dL    BUN 9 9 - 23 mg/dL    Creatinine 0.64 0.60 - 1.30 mg/dL    Sodium 138 132 - 146 mmol/L    Potassium 3.7 3.5 - 5.5 mmol/L    Chloride 107 99 - 109 mmol/L    CO2 27.0 20.0 - 31.0 mmol/L    Calcium 8.1 (L) 8.7 - 10.4 mg/dL    Total Protein 6.9 5.7 - 8.2 g/dL    Albumin 3.83 3.20 - 4.80 g/dL    ALT (SGPT) 57 (H) 7 - 40 U/L    AST (SGOT) 62 (H) 0 - 33 U/L    Alkaline Phosphatase 76 25 - 100 U/L    Total Bilirubin 0.9 0.3 - 1.2 mg/dL    eGFR Non African Amer 98 >60 mL/min/1.73    eGFR  African Amer 119 >60 mL/min/1.73    Globulin 3.1 gm/dL    A/G Ratio 1.2 (L) 1.5 - 2.5 g/dL    BUN/Creatinine Ratio 14.1 7.0 - 25.0    Anion Gap 4.0 3.0 - 11.0 mmol/L   Lipase    Collection Time: 07/11/18  3:20 AM   Result Value Ref Range    Lipase 19 6 - 51  U/L   Lactic Acid, Plasma    Collection Time: 07/11/18  3:20 AM   Result Value Ref Range    Lactate 0.6 0.5 - 2.0 mmol/L   Procalcitonin    Collection Time: 07/11/18  3:20 AM   Result Value Ref Range    Procalcitonin <0.05 <=0.25 ng/mL   CBC Auto Differential    Collection Time: 07/11/18  3:20 AM   Result Value Ref Range    WBC 2.42 (L) 3.50 - 10.80 10*3/mm3    RBC 4.21 3.89 - 5.14 10*6/mm3    Hemoglobin 11.7 11.5 - 15.5 g/dL    Hematocrit 35.3 34.5 - 44.0 %    MCV 83.8 80.0 - 99.0 fL    MCH 27.8 27.0 - 31.0 pg    MCHC 33.1 32.0 - 36.0 g/dL    RDW 13.6 11.3 - 14.5 %    RDW-SD 41.2 37.0 - 54.0 fl    MPV 11.5 6.0 - 12.0 fL    Platelets 131 (L) 150 - 450 10*3/mm3    Neutrophil % 45.9 41.0 - 71.0 %    Lymphocyte % 48.8 (H) 24.0 - 44.0 %    Monocyte % 2.9 0.0 - 12.0 %    Eosinophil % 1.2 0.0 - 3.0 %    Basophil % 1.2 (H) 0.0 - 1.0 %    Immature Grans % 0.8 (H) 0.0 - 0.6 %    Neutrophils, Absolute 1.11 (L) 1.50 - 8.30 10*3/mm3    Lymphocytes, Absolute 1.18 0.60 - 4.80 10*3/mm3    Monocytes, Absolute 0.07 0.00 - 1.00 10*3/mm3    Eosinophils, Absolute 0.03 0.00 - 0.30 10*3/mm3    Basophils, Absolute 0.03 0.00 - 0.20 10*3/mm3    Immature Grans, Absolute 0.02 0.00 - 0.03 10*3/mm3   POC Troponin, Rapid    Collection Time: 07/11/18  3:21 AM   Result Value Ref Range    Troponin I 0.00 0.00 - 0.07 ng/mL   Urinalysis With Culture If Indicated - Urine, Catheter    Collection Time: 07/11/18  3:34 AM   Result Value Ref Range    Color, UA Yellow Yellow, Straw    Appearance, UA Clear Clear    pH, UA 6.0 5.0 - 8.0    Specific Gravity, UA 1.026 1.001 - 1.030    Glucose, UA Negative Negative    Ketones, UA Negative Negative    Bilirubin, UA Negative Negative    Blood, UA Negative Negative    Protein, UA Negative Negative    Leuk Esterase, UA Trace (A) Negative    Nitrite, UA Negative Negative    Urobilinogen, UA 1.0 E.U./dL 0.2 - 1.0 E.U./dL   Urinalysis, Microscopic Only - Urine, Clean Catch    Collection Time: 07/11/18  3:34 AM   Result  Value Ref Range    RBC, UA 0-2 None Seen, 0-2 /HPF    WBC, UA 0-2 None Seen, 0-2 /HPF    Bacteria, UA None Seen None Seen, Trace /HPF    Squamous Epithelial Cells, UA 3-6 (A) None Seen, 0-2 /HPF    Hyaline Casts, UA 7-12 0 - 6 /LPF    Methodology Automated Microscopy      Note: In addition to lab results from this visit, the labs listed above may include labs taken at another facility or during a different encounter within the last 24 hours. Please correlate lab times with ED admission and discharge times for further clarification of the services performed during this visit.    CT Abdomen Pelvis Without Contrast   Final Result     Nonemergent/incidental findings as described without any acute abdominopelvic    abnormality.         NOTE: Suboptimal evaluation of bowel loops and abdominal organs due to lack    of oral and intravenous contrast.          THIS DOCUMENT HAS BEEN ELECTRONICALLY SIGNED BY MARY JARAMILLO MD      XR Chest 1 View   Final Result     Unremarkable study without an active lung lesion.          THIS DOCUMENT HAS BEEN ELECTRONICALLY SIGNED BY MARY JARAMILLO MD        Vitals:    07/11/18 0215 07/11/18 0329 07/11/18 0330 07/11/18 0338   BP: 115/73  125/59    BP Location:       Patient Position:       Pulse: 104  80 77   Resp:       Temp:  98.3 °F (36.8 °C)     TempSrc:  Oral     SpO2: 98%  98% 96%   Weight:       Height:         Medications   sodium chloride 0.9 % flush 10 mL (not administered)   sodium chloride 0.9 % bolus 1,000 mL (0 mL Intravenous Stopped 7/11/18 0451)   acetaminophen (TYLENOL) tablet 1,000 mg (1,000 mg Oral Given 7/11/18 0216)   Morphine sulfate (PF) injection 4 mg (4 mg Intravenous Given 7/11/18 0307)   ondansetron (ZOFRAN) injection 4 mg (4 mg Intravenous Given 7/11/18 0304)   lidocaine 4 % dressing 1 application ( Topical Given 7/11/18 0216)     ECG/EMG Results (last 24 hours)     Procedure Component Value Units Date/Time    ECG 12 Lead [[id number]] Collected:  07/11/18 0138      Updated:  07/11/18 0140                    MDM  Number of Diagnoses or Management Options  Fever, unspecified fever cause: new and requires workup  Genital herpes simplex, unspecified site: new and requires workup  Lower abdominal pain: new and requires workup  Diagnosis management comments: Patient was identified to have genital herpes infection over the labia majora bilaterally.    No other acute after maladies identified on CT scan of the abdomen pelvis, chest x-ray, or laboratory evaluation.    Patient currently is receiving chemotherapy, therefore she was discussed with the on-call oncologist, Dr. Bowie.  Dr. Bowie suggest outpatient valacyclovir and outpatient follow-up.    Patient will be advised to return to the ER immediately if symptoms become more severe or more concerning.       Amount and/or Complexity of Data Reviewed  Clinical lab tests: ordered and reviewed  Tests in the radiology section of CPT®: ordered and reviewed  Decide to obtain previous medical records or to obtain history from someone other than the patient: yes  Review and summarize past medical records: yes  Independent visualization of images, tracings, or specimens: yes    Patient Progress  Patient progress: stable      Final diagnoses:   Genital herpes simplex, unspecified site   Lower abdominal pain   Fever, unspecified fever cause       Documentation assistance provided by bjorn Ferrell.  Information recorded by the bjorn was done at my direction and has been verified and validated by me.     Scottie Ferrell  07/11/18 0145       Scottie Ferrell  07/11/18 0155       Scottie Ferrell  07/11/18 0215       Neeraj Viera MD  07/11/18 0340

## 2018-07-12 ENCOUNTER — HOSPITAL ENCOUNTER (OUTPATIENT)
Dept: PHYSICAL THERAPY | Facility: HOSPITAL | Age: 48
Setting detail: THERAPIES SERIES
Discharge: HOME | End: 2018-07-12
Attending: SURGERY

## 2018-07-12 DIAGNOSIS — I89.0 LYMPHEDEMA OF RIGHT UPPER EXTREMITY: ICD-10-CM

## 2018-07-12 DIAGNOSIS — L90.5 SCAR CONDITIONS AND FIBROSIS OF SKIN: ICD-10-CM

## 2018-07-12 DIAGNOSIS — R68.89 IMPAIRED FUNCTION OF UPPER EXTREMITY: ICD-10-CM

## 2018-07-12 DIAGNOSIS — I97.2 POST-MASTECTOMY LYMPHEDEMA SYNDROME: Primary | ICD-10-CM

## 2018-07-12 DIAGNOSIS — N64.4 MASTODYNIA OF LEFT BREAST: ICD-10-CM

## 2018-07-12 PROCEDURE — 97140 MANUAL THERAPY 1/> REGIONS: CPT | Performed by: PHYSICAL THERAPIST

## 2018-07-12 NOTE — THERAPY TREATMENT NOTE
Outpatient Physical Therapy Lymphedema Treatment Note  [facility]     Patient Name: Tia Sommers  : 1970  MRN: [id number]  Today's Date: 2018        Visit Date: 2018    Visit Dx:    ICD-10-CM ICD-9-CM   1. 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 Lymphedema of right upper extremity I89.0 457.1   3. Scar conditions and fibrosis of skin L90.5 709.2   4. Mastodynia of left breast N64.4 611.71   5. Impaired function of upper extremity R68.89 V49.5       Patient Active Problem List   Diagnosis   • Breast cancer, left (CMS/HCC)              Lymphedema     Row Name 18 1400          Able to rate subjective pain? yes  -MW    Pre-Treatment Pain Level 4  -MW    Post-Treatment Pain Level 4  -MW          Subjective Comments Port is better; still has pulling pain left chest with reaching but reach is also better.   -MW          Ptting Edema Category By severity  -MW    Pitting Edema Moderate;Mild   RUE moderate; Lt trunk mild; LUE very mild   -MW    Edema Assessment Comment RUE mild in forearm and mild to moderate in upperarm; Lt and Rt trunk mild; LUE trace at wrist   -MW          Location/Assessment Upper Extremity;Upper Quadrant  -MW    Upper Extremity Conditions bilateral:;intact;clean;left:;other (comment)   LT multiple tatoos   -MW    Upper Extremity Color/Pigment bilateral:   WNL for ethnicity   -MW    Upper Quadrant Conditions bilateral:;intact;scab(s)   few scabs remain at medial and lateral aspects of incisions   -MW    Upper Quadrant Color/Pigment bilateral:;other (comment)   WNL   -MW    Skin Observations Comment Fading bruise at port site LT   -MW       Manual Lymphatic Drainage initial sequence;opened regional lymph nodes;opened anastamoses;extremity treatment;astym  -MW    Initial Sequence supraclavicular;shoulder collectors;abdomen  -MW    Supraclavicular right;left  -MW    Shoulder Collectors right;left  -MW    Abdomen superficial  -MW    Opened Regional Lymph Nodes  inguinal  -MW    Inguinal right;left  -MW    Opened Anastamoses axillo-inguinal  -MW    Axillo-Inguinal right;left  -MW    Extremity Treatment MLD to full limb;simple/brief MLD  -MW    Simple/Brief MLD LUE   -MW    MLD to Full Limb RUE   -MW    Astym mastectomy protocol  -MW    Mastectomy Protocol supine  -MW    Mastectomy Protocol Comment Evaluator and localizer to Rt and Lt chest, working around port on Lt. Mild to moderate soft tissue disruptions Rt chest, very mild Lt chest. Continued ASTYM into lateral trunk / ribs with all 3 tools; mild soft tissue disruptions bilaterally. Repositioned in shoulder flex/ ABD to open axilla (partially able to achieve position) and work on pectoralis tightness, soft tissue restrictions with extra strokes with isolator.   -MW    Manual Lymphatic Drainage Comments Focused MLD to Rt and Lt lateral trunk / fullness in lateral trunk   -MW          Compression/Skin Care skin care;wrapping location;bandaging;compression garment  -MW    Skin Care moisturizing lotion applied  -MW    Wrapping Location upper extremity  -MW    Wrapping Location UE right:;hand to axilla;left:;hand;forearm  -    Bandage Layers --  -MW    Compression Garment Comments Pt wearing old arm sleeve and gauntlet.   -MW    Compression/Skin Care Comments Follow up with [place] [facility] for arm sleeve.   -      User Key  (r) = Recorded By, (t) = Taken By, (c) = Cosigned By    Initials Name Provider Type    MW Jennifer Noriega, PT Physical Therapist                              PT Assessment/Plan     Row Name 07/12/18 1400          PT Assessment    Functional Limitations Performance in self-care ADL;Limitation in home management;Performance in work activities  -     Impairments Edema;Impaired lymphatic circulation;Impaired postural alignment;Muscle strength;Pain;Range of motion;Sensation  -MW     Assessment Comments Pt reports port has settled more and much less pain. Pt able to demonstrate forward flexion to approx  145 deg and ABD to 95 but with pulling pain / tightness at lateral pectoral aspect. Soft tissue much less tender, able to progress into more of ASTYM mastectomy protocol this visit. Expect pt to be able to obtain a new compression sleeve for RUE soon; LUE very mild edema so no compression at this time. Cont ASTYM protocol and STM/ MLD.   -MW     Rehab Potential Good  -MW     Patient/caregiver participated in establishment of treatment plan and goals Yes  -MW     Patient would benefit from skilled therapy intervention Yes  -MW        PT Plan    PT Frequency 2x/week  -MW     Physical Therapy Interventions (Optional Details) manual therapy techniques;manual lymphatic drainage;postural re-education;ROM (Range of Motion);stretching;strengthening;bandaging;home exercise program  -     PT Plan Comments Cont CDT-MLD; STM; initiate ASTYM prn; compression wrapping; progress HEP   -MW       User Key  (r) = Recorded By, (t) = Taken By, (c) = Cosigned By    Initials Name Provider Type    ALICIA Noriega PT Physical Therapist                     Exercises     Row Name 07/12/18 1400             Subjective Comments    Subjective Comments Port is better; still has pulling pain left chest with reaching but reach is also better.   -MW         Subjective Pain    Able to rate subjective pain? yes  -MW      Pre-Treatment Pain Level 4  -MW      Post-Treatment Pain Level 4  -MW         Total Minutes    87322 - PT Therapeutic Exercise Minutes 5  -MW      56246 - PT Manual Therapy Minutes 55  -MW         Exercise 1    Exercise Name 1 LT shoulder AAROM Flexion in supine   -MW      Reps 1 3  -MW         Exercise 2    Exercise Name 2 LT HBH wings   -MW      Reps 2 5  -MW         Exercise 3    Exercise Name 3 LT shoulder ER in 45 deg ABD  -MW      Reps 3 5  -MW        User Key  (r) = Recorded By, (t) = Taken By, (c) = Cosigned By    Initials Name Provider Type    ALICIA Noriega PT Physical Therapist                       Manual Rx  (last 36 hours)      Manual Treatments     Row Name 07/12/18 1400             Total Minutes    45484 - PT Manual Therapy Minutes 55  -MW         Manual Rx 1    Manual Rx 1 Location Bilat chest / mastectomy sites   -MW      Manual Rx 1 Type STM with tissue bending at lateral pectoralis border  -MW      Manual Rx 1 Grade gentle to moderate (Rt > Lt)  -MW      Manual Rx 1 Duration 15  -MW        User Key  (r) = Recorded By, (t) = Taken By, (c) = Cosigned By    Initials Name Provider Type    MW Jennifer Noriega, PT Physical Therapist                             Time Calculation:   Start Time: 1400  Total Timed Code Minutes- PT: 60 minute(s)   Therapy Suggested Charges     Code   Minutes Charges    07035 (CPT®) Hc Pt Neuromusc Re Education Ea 15 Min      44967 (CPT®) Hc Pt Ther Proc Ea 15 Min 5     53037 (CPT®) Hc Gait Training Ea 15 Min      69924 (CPT®) Hc Pt Therapeutic Act Ea 15 Min      39695 (CPT®) Hc Pt Manual Therapy Ea 15 Min 55 4    97645 (CPT®) Hc Pt Ther Massage- Per 15 Min      88910 (CPT®) Hc Pt Iontophoresis Ea 15 Min      12602 (CPT®) Hc Pt Elec Stim Ea-Per 15 Min      73155 (CPT®) Hc Pt Ultrasound Ea 15 Min      98958 (CPT®) Hc Pt Self Care/Mgmt/Train Ea 15 Min      Total  60 4        Therapy Charges for Today     Code Description Service Date Service Provider Modifiers Qty    34478517212 HC PT MANUAL THERAPY EA 15 MIN 7/12/2018 Jennifer Noriega, PT GP 4                    Jennifer Noriega, PT  7/12/2018

## 2018-07-16 LAB
BACTERIA SPEC AEROBE CULT: NORMAL
BACTERIA SPEC AEROBE CULT: NORMAL

## 2018-07-17 ENCOUNTER — HOSPITAL ENCOUNTER (OUTPATIENT)
Dept: PHYSICAL THERAPY | Facility: HOSPITAL | Age: 48
Setting detail: THERAPIES SERIES
Discharge: HOME | End: 2018-07-17
Attending: SURGERY

## 2018-07-17 DIAGNOSIS — I89.0 LYMPHEDEMA OF RIGHT UPPER EXTREMITY: ICD-10-CM

## 2018-07-17 DIAGNOSIS — R68.89 IMPAIRED FUNCTION OF UPPER EXTREMITY: ICD-10-CM

## 2018-07-17 DIAGNOSIS — N64.4 MASTODYNIA OF LEFT BREAST: ICD-10-CM

## 2018-07-17 DIAGNOSIS — I97.2 POST-MASTECTOMY LYMPHEDEMA SYNDROME: Primary | ICD-10-CM

## 2018-07-17 DIAGNOSIS — L90.5 SCAR CONDITIONS AND FIBROSIS OF SKIN: ICD-10-CM

## 2018-07-17 PROCEDURE — 97140 MANUAL THERAPY 1/> REGIONS: CPT | Performed by: PHYSICAL THERAPIST

## 2018-07-17 NOTE — THERAPY TREATMENT NOTE
Outpatient Physical Therapy Lymphedema Treatment Note  [facility]     Patient Name: Tia Sommers  : 1970  MRN: [id number]  Today's Date: 2018        Visit Date: 2018    Visit Dx:    ICD-10-CM ICD-9-CM   1. 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 Lymphedema of right upper extremity I89.0 457.1   3. Scar conditions and fibrosis of skin L90.5 709.2   4. Mastodynia of left breast N64.4 611.71   5. Impaired function of upper extremity R68.89 V49.5       Patient Active Problem List   Diagnosis   • Breast cancer, left (CMS/HCC)              Lymphedema     Row Name 18 1100          Able to rate subjective pain? yes  -MW    Pre-Treatment Pain Level 4  -MW    Post-Treatment Pain Level 4  -MW          Subjective Comments Stomach very bloated; achy at top of belly / bottom of ribs.   -MW          Ptting Edema Category By severity  -MW    Pitting Edema Moderate;Mild  -MW    Edema Assessment Comment RUE mild to mod; Lt lateral trunk mild to mod; LUE very mild   -MW          Location/Assessment Upper Extremity;Upper Quadrant  -MW    Upper Extremity Conditions bilateral:;intact;clean;left:;other (comment)   LT multiple tatoos   -MW    Upper Extremity Color/Pigment bilateral:   WNL for ethnicity   -MW    Upper Quadrant Conditions bilateral:;intact;scab(s)   scant scabs Rt medial incision   -MW    Upper Quadrant Color/Pigment bilateral:;other (comment)   WNL   -MW    Skin Observations Comment Fading bruise at port site LT   -MW          Manual Lymphatic Drainage initial sequence;opened regional lymph nodes;opened anastamoses;extremity treatment;astym  -MW    Initial Sequence supraclavicular;shoulder collectors;abdomen  -MW    Supraclavicular right;left  -MW    Shoulder Collectors right;left  -MW    Abdomen --   deferred due to fullness/ bloating   -MW    Opened Regional Lymph Nodes inguinal  -MW    Inguinal right;left  -MW    Opened Anastamoses axillo-inguinal  -MW    Axillo-Inguinal  right;left  -MW    Extremity Treatment MLD to full limb;simple/brief MLD  -MW    Simple/Brief MLD Lt lateral trunk  -MW    MLD to Full Limb RUE, Rt lateral trunk   -MW    Astym mastectomy protocol  -    Mastectomy Protocol supine;sitting  -    Mastectomy Protocol Comment Seated on stool with face craddle to focus on LT upper back/ scapular area with evaluator and localizer; extra strokes to leveator scap, mid traps and teres mm groups. Evaluator and localizer to Rt and Lt chest, working around port on Lt. Mild to moderate soft tissue disruptions Rt chest, very mild Lt chest. Continued ASTYM into lateral trunk / ribs with all 3 tools; mild soft tissue disruptions bilaterally. Repositioned in shoulder flex/ ABD to open axilla (partially able to achieve position) and work on pectoralis tightness, soft tissue restrictions with extra strokes with isolator Bilat chest.   -MW    Manual Lymphatic Drainage Comments MLD post ASTYM working around trunk with position changes.   -MW          Compression/Skin Care skin care;wrapping location;bandaging;compression garment  -    Skin Care moisturizing lotion applied  -    Wrapping Location upper extremity  -MW    Wrapping Location UE right:;hand to axilla;left:;hand;forearm  -MW    Compression Garment Comments Pt going to  new arm sleeve post PT   -MW      User Key  (r) = Recorded By, (t) = Taken By, (c) = Cosigned By    Initials Name Provider Type    MW Jennifer Noriega, PT Physical Therapist                              PT Assessment/Plan     Row Name 07/17/18 1100          PT Assessment    Functional Limitations Performance in self-care ADL;Limitation in home management;Performance in work activities  -     Impairments Edema;Impaired lymphatic circulation;Impaired postural alignment;Muscle strength;Pain;Range of motion;Sensation  -     Assessment Comments Soft tissues less restricted with continuation of ASTYM / STM. Incisions more mature with only a few scabs  left on Rt medial incision so extended ASTYM across and around incision lines except over scabbed area. Also increased STM to LT posterior trunk to continue to improve LUE mobility for eventual XRT. Pt to  RUE compression sleeve today.   -MW     Rehab Potential Good  -MW     Patient/caregiver participated in establishment of treatment plan and goals Yes  -MW     Patient would benefit from skilled therapy intervention Yes  -MW        PT Plan    PT Frequency 2x/week  -MW     Physical Therapy Interventions (Optional Details) manual therapy techniques;manual lymphatic drainage;postural re-education;ROM (Range of Motion);stretching;strengthening;bandaging;home exercise program  -MW     PT Plan Comments Cont CDT-MLD; STM; initiate ASTYM prn; compression wrapping; progress HEP   -MW       User Key  (r) = Recorded By, (t) = Taken By, (c) = Cosigned By    Initials Name Provider Type    ALICIA Noriega, PT Physical Therapist                     Exercises     Row Name 07/17/18 1100             Subjective Comments    Subjective Comments Stomach very bloated; achy at top of belly / bottom of ribs.   -MW         Subjective Pain    Able to rate subjective pain? yes  -MW      Pre-Treatment Pain Level 4  -MW      Post-Treatment Pain Level 4  -MW         Total Minutes    15362 - PT Manual Therapy Minutes 55  -MW         Exercise 1    Exercise Name 1 RT HB wings   -MW      Reps 1 8  -MW         Exercise 2    Exercise Name 2 LT HB wings   -MW      Reps 2 8  -MW        User Key  (r) = Recorded By, (t) = Taken By, (c) = Cosigned By    Initials Name Provider Type    ALICIA Noriega, PT Physical Therapist                       Manual Rx (last 36 hours)      Manual Treatments     Row Name 07/17/18 1100             Total Minutes    95484 - PT Manual Therapy Minutes 55  -MW         Manual Rx 1    Manual Rx 1 Location Bilat chest / mastectomy sites   -MW      Manual Rx 1 Type STM with tissue bending at lateral pectoralis  border  -MW      Manual Rx 1 Grade gentle to moderate   -MW      Manual Rx 1 Duration 20  -MW         Manual Rx 2    Manual Rx 2 Location LT UT/ scapular area   -MW      Manual Rx 2 Type STM with tissue bending and lifting techniques   -MW      Manual Rx 2 Grade gentle to moderate   -MW      Manual Rx 2 Duration 15  -MW        User Key  (r) = Recorded By, (t) = Taken By, (c) = Cosigned By    Initials Name Provider Type    MW Jennifer Noriega, PT Physical Therapist              Therapy Education  Education Details: Follow up with PCP or onc / rad onc for belly bloating   Given: Edema management  Program: Modified  How Provided: Verbal  Provided to: Patient  Level of Understanding: Verbalized              Time Calculation:   Start Time: 1100  Total Timed Code Minutes- PT: 55 minute(s)   Therapy Suggested Charges     Code   Minutes Charges    89782 (CPT®) Hc Pt Neuromusc Re Education Ea 15 Min      74835 (CPT®) Hc Pt Ther Proc Ea 15 Min      09177 (CPT®) Hc Gait Training Ea 15 Min      80954 (CPT®) Hc Pt Therapeutic Act Ea 15 Min      72080 (CPT®) Hc Pt Manual Therapy Ea 15 Min 55 4    15238 (CPT®) Hc Pt Ther Massage- Per 15 Min      99255 (CPT®) Hc Pt Iontophoresis Ea 15 Min      77659 (CPT®) Hc Pt Elec Stim Ea-Per 15 Min      57711 (CPT®) Hc Pt Ultrasound Ea 15 Min      95713 (CPT®) Hc Pt Self Care/Mgmt/Train Ea 15 Min      Total  55 4        Therapy Charges for Today     Code Description Service Date Service Provider Modifiers Qty    42659119660 HC PT MANUAL THERAPY EA 15 MIN 7/17/2018 Jennifer Noriega, PT GP 4                    Jennifer Noriega, PT  7/17/2018

## 2018-07-19 ENCOUNTER — HOSPITAL ENCOUNTER (OUTPATIENT)
Dept: PHYSICAL THERAPY | Facility: HOSPITAL | Age: 48
Setting detail: THERAPIES SERIES
Discharge: HOME | End: 2018-07-19
Attending: SURGERY

## 2018-07-19 DIAGNOSIS — I97.2 POST-MASTECTOMY LYMPHEDEMA SYNDROME: Primary | ICD-10-CM

## 2018-07-19 DIAGNOSIS — L90.5 SCAR CONDITIONS AND FIBROSIS OF SKIN: ICD-10-CM

## 2018-07-19 DIAGNOSIS — N64.4 MASTODYNIA OF LEFT BREAST: ICD-10-CM

## 2018-07-19 DIAGNOSIS — I89.0 LYMPHEDEMA OF RIGHT UPPER EXTREMITY: ICD-10-CM

## 2018-07-19 DIAGNOSIS — R68.89 IMPAIRED FUNCTION OF UPPER EXTREMITY: ICD-10-CM

## 2018-07-19 PROCEDURE — 97140 MANUAL THERAPY 1/> REGIONS: CPT | Performed by: PHYSICAL THERAPIST

## 2018-07-19 NOTE — THERAPY TREATMENT NOTE
Outpatient Physical Therapy Lymphedema Treatment Note  [facility]     Patient Name: Tia Sommers  : 1970  MRN: [id number]  Today's Date: 2018        Visit Date: 2018    Visit Dx:    ICD-10-CM ICD-9-CM   1. 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 Lymphedema of right upper extremity I89.0 457.1   3. Scar conditions and fibrosis of skin L90.5 709.2   4. Mastodynia of left breast N64.4 611.71   5. Impaired function of upper extremity R68.89 V49.5       Patient Active Problem List   Diagnosis   • Breast cancer, left (CMS/HCC)              Lymphedema     Row Name 18 1300          Able to rate subjective pain? yes  -MW    Pre-Treatment Pain Level 3  -MW    Post-Treatment Pain Level 3  -MW          Subjective Comments Better. Not only got her arm sleeve but also recieved wigs and scarves.   -MW          Ptting Edema Category By severity  -MW    Pitting Edema Moderate;Mild  -MW    Edema Assessment Comment RUE mild to mod; Rt lateral trunk mild to mod; Lt lateral trunk mild to mod; LUE very mild   -MW          Location/Assessment Upper Extremity;Upper Quadrant  -MW    Upper Extremity Conditions bilateral:;intact;clean;left:;other (comment)   LT multiple tatoos   -MW    Upper Extremity Color/Pigment bilateral:   WNL for ethnicity   -MW    Upper Quadrant Conditions bilateral:;intact;scab(s)   scant scabs Rt medial incision   -MW    Upper Quadrant Color/Pigment bilateral:;other (comment)   WNL   -MW    Skin Observations Comment Fading bruise at port site LT   -MW          Manual Lymphatic Drainage initial sequence;opened regional lymph nodes;opened anastamoses;extremity treatment;astym  -MW    Initial Sequence supraclavicular;shoulder collectors;abdomen  -MW    Supraclavicular right;left  -MW    Shoulder Collectors right;left  -MW    Abdomen --   deferred due to fullness/ bloating   -MW    Opened Regional Lymph Nodes inguinal  -MW    Inguinal right;left  -MW    Opened Anastamoses  axillo-inguinal  -MW    Axillo-Inguinal right;left  -MW    Extremity Treatment MLD to full limb;simple/brief MLD  -MW    Simple/Brief MLD Rt and Lt lateral trunk  -MW    MLD to Full Limb RUE, Rt lateral trunk   -MW    Astym mastectomy protocol  -    Mastectomy Protocol supine;sitting  -    Mastectomy Protocol Comment Seated on stool with face craddle to focus on Rt & Lt upper back/ scapular area with evaluator and localizer; extra strokes to leveator scap, mid traps and teres mm groups. In supine: Evaluator and localizer to Rt and Lt chest, working around port on Lt. Mild to moderate soft tissue disruptions Rt chest, very mild Lt chest. Continued ASTYM into lateral trunk / ribs; mild soft tissue disruptions bilaterally. Repositioned in shoulder flex/ ABD to open axilla (partially able to achieve position) and work on pectoralis tightness, soft tissue restrictions with extra strokes with isolator Bilat chest.   -MW    Manual Lymphatic Drainage Comments MLD post ASTYM working around trunk with position changes.   -MW          Compression/Skin Care skin care;wrapping location;bandaging;compression garment  -    Skin Care moisturizing lotion applied   residual cocoa butter on trunk   -MW    Wrapping Location upper extremity  -MW    Wrapping Location UE right:;hand to axilla  -MW    Compression Garment Comments Assisted pt to don new RUE arm sleeve; provided gripping garden gloves to aid donning.   -      User Key  (r) = Recorded By, (t) = Taken By, (c) = Cosigned By    Initials Name Provider Type     Jennifer Noriega, PT Physical Therapist                              PT Assessment/Plan     Row Name 07/19/18 1300          PT Assessment    Functional Limitations Performance in self-care ADL;Limitation in home management;Performance in work activities  -     Impairments Edema;Impaired lymphatic circulation;Impaired postural alignment;Muscle strength;Pain;Range of motion;Sensation  -     Assessment Comments  RUE sleeve fits well; pt working hard to pull sleeve into position; provided laytex grippy gardening gloves to assist with scooting fabric into place. Chest soft tissues slowly loosening with ASTYM and STM. Pt to continue with HEP and self care next week while out of town next week.   -MW     Rehab Potential Good  -MW     Patient/caregiver participated in establishment of treatment plan and goals Yes  -MW     Patient would benefit from skilled therapy intervention Yes  -MW        PT Plan    PT Frequency 2x/week  -MW     Physical Therapy Interventions (Optional Details) manual therapy techniques;manual lymphatic drainage;postural re-education;ROM (Range of Motion);stretching;strengthening;bandaging;home exercise program  -MW     PT Plan Comments Cont ASTYM/STM, MLD; compression wrapping; progress HEP   -MW       User Key  (r) = Recorded By, (t) = Taken By, (c) = Cosigned By    Initials Name Provider Type    ALICIA Noriega, PT Physical Therapist                     Exercises     Row Name 07/19/18 1300             Subjective Comments    Subjective Comments Better. Not only got her arm sleeve but also recieved wigs and scarves.   -MW         Subjective Pain    Able to rate subjective pain? yes  -MW      Pre-Treatment Pain Level 3  -MW      Post-Treatment Pain Level 3  -MW         Total Minutes    72628 - PT Manual Therapy Minutes 55  -MW         Exercise 1    Exercise Name 1 RT [facility] wings   -MW      Reps 1 8  -MW         Exercise 2    Exercise Name 2 LT [facility] wings   -MW      Reps 2 8  -MW        User Key  (r) = Recorded By, (t) = Taken By, (c) = Cosigned By    Initials Name Provider Type    ALICIA Noriega, PT Physical Therapist                       Manual Rx (last 36 hours)      Manual Treatments     Row Name 07/19/18 1300             Total Minutes    90702 - PT Manual Therapy Minutes 55  -MW         Manual Rx 1    Manual Rx 1 Location Bilat chest / mastectomy sites   -MW      Manual Rx 1 Type STM with tissue  bending at lateral pectoralis border and at incision lines   -MW      Manual Rx 1 Grade gentle to moderate   -MW      Manual Rx 1 Duration 20  -MW         Manual Rx 2    Manual Rx 2 Location LT & RT UT/ scapular area   -MW      Manual Rx 2 Type STM with tissue bending and lifting techniques   -MW      Manual Rx 2 Grade gentle to moderate   -MW      Manual Rx 2 Duration 15  -MW        User Key  (r) = Recorded By, (t) = Taken By, (c) = Cosigned By    Initials Name Provider Type    ALICIA Noriega, PT Physical Therapist                             Time Calculation:   Start Time: 1300  Total Timed Code Minutes- PT: 55 minute(s)   Therapy Suggested Charges     Code   Minutes Charges    70612 (CPT®) Hc Pt Neuromusc Re Education Ea 15 Min      03062 (CPT®) Hc Pt Ther Proc Ea 15 Min      77742 (CPT®) Hc Gait Training Ea 15 Min      09796 (CPT®) Hc Pt Therapeutic Act Ea 15 Min      12785 (CPT®) Hc Pt Manual Therapy Ea 15 Min 55 4    47744 (CPT®) Hc Pt Ther Massage- Per 15 Min      46055 (CPT®) Hc Pt Iontophoresis Ea 15 Min      16336 (CPT®) Hc Pt Elec Stim Ea-Per 15 Min      83697 (CPT®) Hc Pt Ultrasound Ea 15 Min      91967 (CPT®) Hc Pt Self Care/Mgmt/Train Ea 15 Min      Total  55 4        Therapy Charges for Today     Code Description Service Date Service Provider Modifiers Qty    55998805060 HC PT MANUAL THERAPY EA 15 MIN 7/19/2018 Jennifer Noriega, PT GP 4                    Jennifer Noriega, PT  7/19/2018

## 2018-07-27 ENCOUNTER — INFUSION (OUTPATIENT)
Dept: ONCOLOGY | Facility: HOSPITAL | Age: 48
End: 2018-07-27

## 2018-07-27 ENCOUNTER — OFFICE VISIT (OUTPATIENT)
Dept: ONCOLOGY | Facility: CLINIC | Age: 48
End: 2018-07-27

## 2018-07-27 VITALS
DIASTOLIC BLOOD PRESSURE: 99 MMHG | TEMPERATURE: 98.1 F | HEART RATE: 105 BPM | WEIGHT: 165 LBS | RESPIRATION RATE: 16 BRPM | BODY MASS INDEX: 26.52 KG/M2 | HEIGHT: 66 IN | SYSTOLIC BLOOD PRESSURE: 183 MMHG

## 2018-07-27 DIAGNOSIS — C50.912 MALIGNANT NEOPLASM OF LEFT BREAST IN FEMALE, ESTROGEN RECEPTOR POSITIVE, UNSPECIFIED SITE OF BREAST (HCC): Primary | ICD-10-CM

## 2018-07-27 DIAGNOSIS — Z17.0 MALIGNANT NEOPLASM OF LEFT BREAST IN FEMALE, ESTROGEN RECEPTOR POSITIVE, UNSPECIFIED SITE OF BREAST (HCC): ICD-10-CM

## 2018-07-27 DIAGNOSIS — R10.84 GENERALIZED ABDOMINAL PAIN: ICD-10-CM

## 2018-07-27 DIAGNOSIS — C50.912 MALIGNANT NEOPLASM OF LEFT BREAST IN FEMALE, ESTROGEN RECEPTOR POSITIVE, UNSPECIFIED SITE OF BREAST (HCC): ICD-10-CM

## 2018-07-27 DIAGNOSIS — Z17.0 MALIGNANT NEOPLASM OF LEFT BREAST IN FEMALE, ESTROGEN RECEPTOR POSITIVE, UNSPECIFIED SITE OF BREAST (HCC): Primary | ICD-10-CM

## 2018-07-27 DIAGNOSIS — A60.04 HERPES SIMPLEX VULVOVAGINITIS: ICD-10-CM

## 2018-07-27 LAB
ALBUMIN SERPL-MCNC: 3.91 G/DL (ref 3.2–4.8)
ALBUMIN/GLOB SERPL: 1.2 G/DL (ref 1.5–2.5)
ALP SERPL-CCNC: 141 U/L (ref 25–100)
ALT SERPL W P-5'-P-CCNC: 218 U/L (ref 7–40)
ANION GAP SERPL CALCULATED.3IONS-SCNC: 7 MMOL/L (ref 3–11)
AST SERPL-CCNC: 127 U/L (ref 0–33)
BILIRUB SERPL-MCNC: 0.5 MG/DL (ref 0.3–1.2)
BUN BLD-MCNC: 12 MG/DL (ref 9–23)
BUN/CREAT SERPL: 17.1 (ref 7–25)
CALCIUM SPEC-SCNC: 8.7 MG/DL (ref 8.7–10.4)
CHLORIDE SERPL-SCNC: 109 MMOL/L (ref 99–109)
CO2 SERPL-SCNC: 25 MMOL/L (ref 20–31)
CREAT BLD-MCNC: 0.7 MG/DL (ref 0.6–1.3)
ERYTHROCYTE [DISTWIDTH] IN BLOOD BY AUTOMATED COUNT: 14.3 % (ref 11.3–14.5)
GFR SERPL CREATININE-BSD FRML MDRD: 107 ML/MIN/1.73
GFR SERPL CREATININE-BSD FRML MDRD: 89 ML/MIN/1.73
GLOBULIN UR ELPH-MCNC: 3.3 GM/DL
GLUCOSE BLD-MCNC: 110 MG/DL (ref 70–100)
HCT VFR BLD AUTO: 36 % (ref 34.5–44)
HGB BLD-MCNC: 11.1 G/DL (ref 11.5–15.5)
LYMPHOCYTES # BLD AUTO: 1.5 10*3/MM3 (ref 0.6–4.8)
LYMPHOCYTES NFR BLD AUTO: 17.3 % (ref 24–44)
MCH RBC QN AUTO: 26.6 PG (ref 27–31)
MCHC RBC AUTO-ENTMCNC: 31 G/DL (ref 32–36)
MCV RBC AUTO: 85.9 FL (ref 80–99)
MONOCYTES # BLD AUTO: 0.2 10*3/MM3 (ref 0–1)
MONOCYTES NFR BLD AUTO: 2.4 % (ref 0–12)
NEUTROPHILS # BLD AUTO: 6.7 10*3/MM3 (ref 1.5–8.3)
NEUTROPHILS NFR BLD AUTO: 80.3 % (ref 41–71)
PLATELET # BLD AUTO: 360 10*3/MM3 (ref 150–450)
PMV BLD AUTO: 7.9 FL (ref 6–12)
POTASSIUM BLD-SCNC: 4.1 MMOL/L (ref 3.5–5.5)
PROT SERPL-MCNC: 7.2 G/DL (ref 5.7–8.2)
RBC # BLD AUTO: 4.19 10*6/MM3 (ref 3.89–5.14)
SODIUM BLD-SCNC: 141 MMOL/L (ref 132–146)
WBC NRBC COR # BLD: 8.4 10*3/MM3 (ref 3.5–10.8)

## 2018-07-27 PROCEDURE — 96375 TX/PRO/DX INJ NEW DRUG ADDON: CPT

## 2018-07-27 PROCEDURE — 96417 CHEMO IV INFUS EACH ADDL SEQ: CPT

## 2018-07-27 PROCEDURE — 96413 CHEMO IV INFUSION 1 HR: CPT

## 2018-07-27 PROCEDURE — 80053 COMPREHEN METABOLIC PANEL: CPT

## 2018-07-27 PROCEDURE — 25010000002 CYCLOPHOSPHAMIDE PER 100 MG: Performed by: NURSE PRACTITIONER

## 2018-07-27 PROCEDURE — 25010000002 DOCETAXEL 20 MG/ML SOLUTION 1 ML VIAL: Performed by: NURSE PRACTITIONER

## 2018-07-27 PROCEDURE — 85025 COMPLETE CBC W/AUTO DIFF WBC: CPT

## 2018-07-27 PROCEDURE — 25010000002 PALONOSETRON PER 25 MCG: Performed by: NURSE PRACTITIONER

## 2018-07-27 PROCEDURE — 99214 OFFICE O/P EST MOD 30 MIN: CPT | Performed by: NURSE PRACTITIONER

## 2018-07-27 PROCEDURE — 25010000002 DOCETAXEL 20 MG/ML SOLUTION 4 ML VIAL: Performed by: NURSE PRACTITIONER

## 2018-07-27 PROCEDURE — 25010000002 HEPARIN FLUSH (PORCINE) 100 UNIT/ML SOLUTION: Performed by: INTERNAL MEDICINE

## 2018-07-27 RX ORDER — PALONOSETRON 0.05 MG/ML
0.25 INJECTION, SOLUTION INTRAVENOUS ONCE
Status: CANCELLED | OUTPATIENT
Start: 2018-07-27

## 2018-07-27 RX ORDER — SODIUM CHLORIDE 9 MG/ML
250 INJECTION, SOLUTION INTRAVENOUS ONCE
Status: CANCELLED | OUTPATIENT
Start: 2018-07-27

## 2018-07-27 RX ORDER — SODIUM CHLORIDE 9 MG/ML
250 INJECTION, SOLUTION INTRAVENOUS ONCE
Status: COMPLETED | OUTPATIENT
Start: 2018-07-27 | End: 2018-07-27

## 2018-07-27 RX ORDER — PALONOSETRON 0.05 MG/ML
0.25 INJECTION, SOLUTION INTRAVENOUS ONCE
Status: COMPLETED | OUTPATIENT
Start: 2018-07-27 | End: 2018-07-27

## 2018-07-27 RX ORDER — SODIUM CHLORIDE 0.9 % (FLUSH) 0.9 %
10 SYRINGE (ML) INJECTION AS NEEDED
Status: CANCELLED | OUTPATIENT
Start: 2018-07-27

## 2018-07-27 RX ORDER — FAMCICLOVIR 250 MG/1
250 TABLET ORAL 2 TIMES DAILY
Qty: 60 TABLET | Refills: 2 | Status: SHIPPED | OUTPATIENT
Start: 2018-07-27 | End: 2018-09-14

## 2018-07-27 RX ADMIN — HEPARIN 500 UNITS: 100 SYRINGE at 15:02

## 2018-07-27 RX ADMIN — DOCETAXEL 140 MG: 20 INJECTION, SOLUTION, CONCENTRATE INTRAVENOUS at 13:16

## 2018-07-27 RX ADMIN — SODIUM CHLORIDE 250 ML: 9 INJECTION, SOLUTION INTRAVENOUS at 13:09

## 2018-07-27 RX ADMIN — PALONOSETRON HYDROCHLORIDE 0.25 MG: 0.25 INJECTION INTRAVENOUS at 13:09

## 2018-07-27 RX ADMIN — CYCLOPHOSPHAMIDE 1000 MG: 1 INJECTION, POWDER, FOR SOLUTION INTRAVENOUS; ORAL at 14:21

## 2018-07-27 NOTE — PROGRESS NOTES
CHIEF COMPLAINT: 1.  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  Abdominal pain                                       3.  Follow-up for left breast cancer    Problem List:     Breast cancer, left (CMS/HCC)    5/16/2018 Initial Diagnosis     1.) Stage IIa [id number] Breast cancer, left: Had bilateral mastectomy on 5/16/18 with benign disease on the right.  The left breast had infiltrating lobular carcinoma, Bloom Gutierrez 6 out of 9, 2 foci largest being 4 cm, 4 total nodes and 2 sentinel nodes taken all negative.  There was a positive superficial margin.  Biopsy on 3/28/18 of abnormality found on screening mammogram was 95% 3+ positive ER and 95% 3+ positive LA and HER-2/taiwo 0+.  Baseline liver enzymes abnormal ALT 79 AST 55.    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  CHEK2 also causes an increased risk for colon cancer (up to 9.5% lifetime risk).           6/18/2018 -  Other Event     Oncotype score 28 with 18% 10 year risk of distant recurrence on tamoxifen alone.  There is about a 6% lower risk of distant recurrence when chemotherapy is added to the hormonal blockade per this prediction.  The Oncotype showed her to be ER positive and LA negative and HER-2/taiwo negative.         7/6/2018 -  Chemotherapy     OP BREAST TC DOCEtaxel / Cyclophosphamide            2.  Genital Herpes Simplex    HISTORY OF PRESENT ILLNESS:  The patient is a 48 y.o. female, here for follow up on management of left breast cancer.  After the patient's first course of chemotherapy with Taxotere and Cytoxan she presented to the emergency room on 7/11/2018 with complaints of abdominal pain and vaginal lesions.  She was found to have had an outbreak of  genital herpes simplex.  She completed 10 days of Valtrex and those have resolved.  Continues to have diffuse abdominal pain, states that she has been having this abdominal pain since her mastectomy.  Does have some constipation.  Has had 2 prior CT scans that were both negative, the first was on June 7, 2018 and more recently on July 11, 2018.  She's had no fevers or chills, she denies any mouth sores.      Past Medical History:   Diagnosis Date   • Arthritis     minna knees and hands   • Breast cancer (CMS/HCC) 2000    dx in 1998;  2018   • Drug therapy 2000   • Hx of radiation therapy 2000   • Lupus 2014   • Lymphedema     right arm     Past Surgical History:   Procedure Laterality Date   • BONE BIOPSY      back   • BREAST BIOPSY Right    • BREAST LUMPECTOMY Right 2000   • COLONOSCOPY      > 10 years   • DIAGNOSTIC LAPAROSCOPY EXPLORATORY LAPAROTOMY     • ENDOSCOPY     • MASTECTOMY W/ SENTINEL NODE BIOPSY Bilateral 5/16/2018    Procedure: BREAST MASTECTOMY BILATERAL WITH LEFT SENTINEL NODE BIOPSY;  Surgeon: Keven Dye MD;  Location: Pending sale to [facility];  Service: General   • SPLENECTOMY     • VENOUS ACCESS DEVICE (PORT) INSERTION     • VENOUS ACCESS DEVICE (PORT) REMOVAL  2003       Allergies   Allergen Reactions   • Iodine Anaphylaxis   • Penicillins Anaphylaxis   • Latex Rash       Family History and Social History reviewed and changed as necessary      REVIEW OF SYSTEM:   Review of Systems   Constitutional: Negative for appetite change, chills, diaphoresis, fatigue, fever and unexpected weight change.   HENT:   Negative for mouth sores, sore throat and trouble swallowing.    Eyes: Negative for icterus.   Respiratory: Negative for cough, hemoptysis and shortness of breath.    Cardiovascular: Negative for chest pain, leg swelling and palpitations.   Gastrointestinal: Positive for abdominal distention and abdominal pain; negative for blood in stool, constipation, diarrhea, nausea and vomiting.   Endocrine: Negative for  "hot flashes.   Genitourinary: Negative for bladder incontinence, difficulty urinating, dysuria, frequency and hematuria.    Musculoskeletal: Negative for gait problem, neck pain and neck stiffness.   Skin: Negative for rash.   Neurological: Negative for dizziness, gait problem, headaches, light-headedness and numbness.   Hematological: Negative for adenopathy. Does not bruise/bleed easily.   Psychiatric/Behavioral: Negative for depression. The patient is not nervous/anxious.    All other systems reviewed and are negative.       PHYSICAL EXAM    Vitals:    07/27/18 1127   BP: (!) 183/99   Pulse: 105   Resp: 16   Temp: 98.1 °F (36.7 °C)   Weight: 74.8 kg (165 lb)   Height: 167.6 cm (66\")     Constitutional: Appears well-developed and well-nourished. No distress.   ECOG: (0) Fully active, able to carry on all predisease performance without restriction  HENT:   Head: Normocephalic.   Mouth/Throat: Oropharynx is clear and moist.   Eyes: Conjunctivae are normal. Pupils are equal, round, and reactive to light. No scleral icterus.   Neck: Neck supple. No JVD present. No thyromegaly present.   Cardiovascular: Normal rate, regular rhythm and normal heart sounds.    Pulmonary/Chest: Breath sounds normal. No respiratory distress.   Abdominal: Soft. Exhibits no distension and no mass. There is no hepatosplenomegaly. There is no tenderness. There is no rebound and no guarding.   Musculoskeletal:Exhibits no edema, tenderness or deformity.   Neurological: Alert and oriented to person, place, and time. Exhibits normal muscle tone.   Skin: No ecchymosis, no petechiae and no rash noted. Not diaphoretic. No cyanosis. Nails show no clubbing.   Psychiatric: Normal mood and affect.   Vitals reviewed.  Diagnostic data: All previous office notes, radiology reports and outside physician notes including those related to July 11, 2018 emergency room visit were reviewed at time of visit.    No radiology results for the last 7 days      "     ASSESSMENT & PLAN:    1. Stage IIa ER positive IA negative HER-2/taiwo negative intermediate risk Oncotype breast cancer  2. Elevation of liver enzymes with CT negative for metastasis.   3.   Chek2 mutation conferring higher risk of colon as well as breast cancer.  4.   Abdominal pain  5.   Genital Herpes Simplex  6.   Lymphedema    Discussion: We will continue today with cycle #2 of a planned 4 cycles of Taxotere and Cytoxan.  We will check CBC and CMP today.  She presented to the emergency room on July 11, 2018 with complaints of abdominal pain and vaginal lesions, CT of the abdomen and pelvis was negative and she was diagnosed with genital herpes simplex.  She completed 10 day course of bowel corrects in the genital lesions have resolved.  This was a new problem, she states she has never had herpes or vaginal lesions before.  I have sent in a prescription for Famvir 250 mg twice daily that she will continue taking during chemotherapy to hopefully prevent recurrence.  She is having continued abdominal pain with no acute findings on exam.  She does have checked to mutation on genetic testing which puts her at higher risk for colon cancer, we will get her set up for an EGD and colonoscopy once she completes chemotherapy.  She will also need radiation and has seen Dr. Fowler and we will get her back to him for radiation when she completes chemotherapy.  She has lymphedema and is working with physical therapy.  I will see her back in 3 weeks for cycle #3.      Nuvia Andrews, APRN    07/27/2018

## 2018-07-30 ENCOUNTER — HOSPITAL ENCOUNTER (OUTPATIENT)
Dept: PHYSICAL THERAPY | Facility: HOSPITAL | Age: 48
Setting detail: THERAPIES SERIES
Discharge: HOME | End: 2018-07-30
Attending: SURGERY

## 2018-07-30 DIAGNOSIS — R68.89 IMPAIRED FUNCTION OF UPPER EXTREMITY: ICD-10-CM

## 2018-07-30 DIAGNOSIS — N64.4 MASTODYNIA OF LEFT BREAST: ICD-10-CM

## 2018-07-30 DIAGNOSIS — I89.0 LYMPHEDEMA OF RIGHT UPPER EXTREMITY: ICD-10-CM

## 2018-07-30 DIAGNOSIS — I97.2 POST-MASTECTOMY LYMPHEDEMA SYNDROME: Primary | ICD-10-CM

## 2018-07-30 DIAGNOSIS — L90.5 SCAR CONDITIONS AND FIBROSIS OF SKIN: ICD-10-CM

## 2018-07-30 PROCEDURE — 97110 THERAPEUTIC EXERCISES: CPT | Performed by: PHYSICAL THERAPIST

## 2018-07-30 PROCEDURE — 97140 MANUAL THERAPY 1/> REGIONS: CPT | Performed by: PHYSICAL THERAPIST

## 2018-07-30 NOTE — THERAPY PROGRESS REPORT/RE-CERT
"    Outpatient Physical Therapy Lymphedema Progress Note  [facility]     Patient Name: Tia Sommers  : 1970  MRN: [id number]  Today's Date: 2018        Visit Date: 2018    Visit Dx:    ICD-10-CM ICD-9-CM   1. 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 Lymphedema of right upper extremity I89.0 457.1   3. Scar conditions and fibrosis of skin L90.5 709.2   4. Mastodynia of left breast N64.4 611.71   5. Impaired function of upper extremity R68.89 V49.5       Patient Active Problem List   Diagnosis   • Breast cancer, left (CMS/HCC)              Lymphedema     Row Name 18 0900          Able to rate subjective pain? yes  -MW    Pre-Treatment Pain Level 4  -MW    Post-Treatment Pain Level 4  -MW    Subjective Pain Comment \"tender in the pockets\" (tight areas superior and lateral to pectoralis tightness)   -MW          Subjective Comments Trip was good, but still really tired. Got home Thursday, had chemo on Friday. Sat and Sun she spent in bed; quesy and tired; today is better as \"granola bar stayed down\"   -MW          Ptting Edema Category By severity  -MW    Pitting Edema Moderate;Mild  -MW    Edema Assessment Comment RUE mild upper arm down 1 cm; Rt lateral trunk mild to mod; Lt lateral trunk mild; LUE mild proximal upper arm only   -MW          Location/Assessment Upper Extremity;Upper Quadrant  -MW    Upper Extremity Conditions bilateral:;intact;clean;left:;other (comment)   LT multiple tatoos   -MW    Upper Extremity Color/Pigment bilateral:   WNL for ethnicity   -MW    Upper Quadrant Conditions bilateral:;intact  -MW    Upper Quadrant Color/Pigment bilateral:;other (comment)   WNL   -MW    Skin Observations Comment band aid over port site   -MW          Lymphedema Sensation Comments Tender superior to lateral aspect of incions/ areas of tissue puckering; overall much less hypersensitve than at eval.   -MW          Upper Extremity Capillary Refill right:;left:;less than 3 " seconds  -MW          Measurement Type(s) Quick Girth  -MW    Quick Girth Areas Upper extremities  -MW          Axilla 32 cm  -MW    Mid upper arm 30.5 cm  -MW    Elbow 26 cm  -MW    Mid forearm 22 cm  -MW    Wrist crease 15.9 cm  -MW    LUE Quick Girth Total 126.4  -MW          Axilla 30.5 cm  -MW    Mid upper arm 31.7 cm  -MW    Elbow 27 cm  -MW    Mid forearm 25.8 cm  -MW    Wrist crease 15.7 cm  -MW    Web space 19.2 cm  -MW    Met-heads 18 cm  -MW    RUE Quick Girth Total 167.9  -MW          Manual Lymphatic Drainage initial sequence;opened regional lymph nodes;opened anastamoses;extremity treatment;astym  -MW    Initial Sequence supraclavicular;shoulder collectors  -MW    Supraclavicular right;left  -MW    Shoulder Collectors right;left  -MW    Abdomen --   deferred due to fullness/ bloating   -MW    Opened Regional Lymph Nodes inguinal  -MW    Inguinal right;left  -MW    Opened Anastamoses axillo-inguinal  -MW    Axillo-Inguinal right;left  -MW    Extremity Treatment MLD to full limb;simple/brief MLD  -MW    Simple/Brief MLD Rt and Lt lateral trunk  -MW    MLD to Full Limb RUE with focus on forearm and hand   -MW    Astym mastectomy protocol  -MW    Mastectomy Protocol supine;sitting  -    Mastectomy Protocol Comment Seated on stool with face craddle to focus on Rt & Lt upper back/ scapular area with evaluator and localizer; extra strokes to leveator scap, mid traps and teres mm groups. In supine: Evaluator and localizer to Rt and Lt chest, working around port on Lt. Mild to moderate soft tissue disruptions Rt chest, very mild Lt chest. Continued ASTYM into lateral trunk / ribs; mild soft tissue disruptions bilaterally. Repositioned in shoulder flex/ ABD to open axilla (partially able to achieve position) and work on pectoralis tightness, soft tissue restrictions with extra strokes with isolator Bilat chest. Isolator star burst around incision lines bilaterally.   -MW    Manual Lymphatic Drainage Comments  MLD post ASTYM working around trunk with position changes.   -MW          Compression/Skin Care skin care;wrapping location;bandaging;compression garment  -MW    Skin Care moisturizing lotion applied   residual cocoa butter on trunk   -MW    Wrapping Location upper extremity  -MW    Wrapping Location UE right:;hand to axilla  -MW    Compression Garment Comments Assisted pt to don RUE arm sleeve  -MW    Compression/Skin Care Comments Pt with [employer] rep for RUE and shoulder trial pump run.   -MW      User Key  (r) = Recorded By, (t) = Taken By, (c) = Cosigned By    Initials Name Provider Type    ALICIA Noriega PT Physical Therapist                  PT Ortho     Row Name 07/30/18 0900       Posture/Observations    Alignment Options Rounded shoulders  -MW    Rounded Shoulders Bilateral:;Mild;Moderate  -MW       Right Upper Ext    Rt Shoulder Abduction AROM 110 c/o pulling pain   -MW    Rt Shoulder Flexion AROM 150 c/o pulling pain   -MW    Rt Shoulder External Rotation AROM 45 c/o pulling   -MW    Rt Shoulder Internal Rotation AROM hand to mid-low back (hand width lower than Lt)   -MW       Left Upper Ext    Lt Shoulder Abduction AROM 95 c/o pain and jose martin g  -MW    Lt Shoulder Flexion AROM 125 c/o pulling and pain   -MW    Lt Shoulder External Rotation AROM 35 c/o pulling and pain   -MW    Lt Shoulder Internal Rotation AROM hand to mid upper back; WFL   -MW        Strength Right    # Reps 3  -MW    Right Rung 2  -MW    Right  Test 1 50  -MW    Right  Test 2 47  -MW    Right  Test 3 47  -MW     Strength Average Right 48  -MW        Strength Left    # Reps 3  -MW    Left Rung 2  -MW    Left  Test 1 25  -MW    Left  Test 2 35  -MW    Left  Test 3 35  -MW     Strength Average Left 31.67  -MW      User Key  (r) = Recorded By, (t) = Taken By, (c) = Cosigned By    Initials Name Provider Type    ALICIA Noriega PT Physical Therapist                        PT Assessment/Plan      Row Name 07/30/18 0845          Functional Limitations Performance in self-care ADL;Limitation in home management;Performance in work activities  -    Impairments Edema;Impaired lymphatic circulation;Impaired postural alignment;Muscle strength;Pain;Range of motion;Sensation  -    Assessment Comments Pt has met all STG's and is progressing well toward LTG's. Her DASH score improved from 61% impaired to 45% impaired; part of this remaining impairment is due to a wrist injury on her Lt side / dominant hand. RUE lymphedema is fairly stable with her new arm sleeve; she has done well to maintain herself during her vacation which involved several airplane flights. She is in process of being fit for a home compression pump system to provide her more options for self management of her RUE lymphedema and to preventively treat the LUE. Her ROM is improving to allow for positioning for XRT, but is not yet WFL bilaterally. Expect to continue to progress toward remaining goals as well as to maximize her function and QOL.   -MW    Rehab Potential Good  -MW    Patient/caregiver participated in establishment of treatment plan and goals Yes  -MW    Patient would benefit from skilled therapy intervention Yes  -MW          PT Frequency 2x/week   Consider decreasing freq to conserve visits for post XRT   -MW    Predicted Duration of Therapy Intervention (Therapy Eval) 8 weeks   -MW    Planned CPT's? PT MANUAL THERAPY EA 15 MIN: 02619;PT THER PROC EA 15 MIN: 37358  -MW    Physical Therapy Interventions (Optional Details) manual therapy techniques;manual lymphatic drainage;postural re-education;ROM (Range of Motion);stretching;strengthening;bandaging;home exercise program  -MW    PT Plan Comments Cont ASTYM/STM, MLD; compression wrapping; progress HEP   -MW      User Key  (r) = Recorded By, (t) = Taken By, (c) = Cosigned By    Initials Name Provider Type    ALICIA Noriega, PT Physical Therapist                     Exercises   "   Row Name 07/30/18           Existing Precautions/Restrictions other (see comments)   Lt subclavian port; current chemotherapy   -MW          Subjective Comments Trip was good, but still really tired. Got home Thursday, had chemo on Friday. Sat and Sun she spent in bed; quesy and tired; today is better as \"granola bar stayed down\"   -MW          Able to rate subjective pain? yes  -MW    Pre-Treatment Pain Level 4  -MW    Post-Treatment Pain Level 4  -MW    Subjective Pain Comment \"tender in the pockets\" (tight areas superior and lateral to pectoralis tightness)   -MW          18677 - PT Therapeutic Exercise Minutes 8  -MW    91209 - PT Manual Therapy Minutes  55          Exercise Name 1 RT HBH wings   -MW    Reps 1 10  -MW          Exercise Name 2 LT HBH wings   -MW    Reps 2 8  -MW          Exercise Name 3 Hands & knees forward shoulder flexion   -MW    Cueing 3 Verbal;Tactile  -MW    Reps 3 5  -MW    Additional Comments Rt and Lt alternating; vc to breath, and focus on core as well as reaching forward.   -MW      User Key  (r) = Recorded By, (t) = Taken By, (c) = Cosigned By    Initials Name Provider Type    MW Jennifer Noriega, PT Physical Therapist                       Manual Rx (last 36 hours)      Manual Treatments     Row Name 07/30/18 0845             Total Minutes    13957 - PT Manual Therapy Minutes 55  -MW         Manual Rx 1    Manual Rx 1 Location Bilat chest / mastectomy sites   -MW      Manual Rx 1 Type STM with tissue bending at lateral pectoralis border and at incision lines   -MW      Manual Rx 1 Grade gentle to moderate; Rt >> Lt   -MW      Manual Rx 1 Duration 18  -MW         Manual Rx 2    Manual Rx 2 Location LT & RT upper trap, mid trap, leveator scap and teres mm groups   -MW      Manual Rx 2 Type STM with tissue bending and lifting techniques   -MW      Manual Rx 2 Grade gentle to moderate   -MW      Manual Rx 2 Duration 18  -MW        User Key  (r) = Recorded By, (t) = Taken By, (c) = " Cosigned By    Initials Name Provider Type    ALICIA Noriega, PT Physical Therapist                PT OP Goals     Row Name 07/30/18 0845          STG Date to Achieve 07/26/18  -MW    STG 1 Pt independent with basic HEP for shoulder ROM and posture.   -MW    STG 1 Progress Met  -MW    STG 2 RUE lymphedema to decrease at least 10%.   -MW    STG 2 Progress Partially Met  -MW    STG 3 LT shoulder flexibility to improve at least 15 degrees to improve self care and prepare for positioning for XRT.   -MW    STG 3 Progress Met  -MW    STG 4 Pt to report decreased pain ratings on DASH to 3/5 or less.   -MW    STG 4 Progress Met  -MW          LTG 1 Pt independent with advanced HEP for posture, flexibility and strengthening to promote safe self care.   -MW    LTG 1 Progress Ongoing  -MW    LTG 2 Pt to demonstrate improved function with at least 20% improvement on DASH score.   -MW    LTG 2 Progress Met  -MW    LTG 2 Progress Comments Progressed from 61% impaired to 45%   -MW    LTG 3 Bilat shoulder flexion to be greater than 160 deg, for pt to be able to achieve positioning for XRT.   -MW    LTG 3 Progress Ongoing;Progressing  -MW    LTG 4 Pt measured and fit with RUE compression sleeve/ glove and Lt prn.   -MW    LTG 4 Progress Met  -MW    LTG 5 Pt to be independent with self lymphedema care; including use of compression pump, compression sleeves/ gloves and skin care.   -MW    LTG 5 Progress Ongoing  -MW    LTG 5 Progress Comments Initial [employer] pump trial 7/30/18.   -MW          PT Goal Re-Cert Due Date 09/28/18  -      User Key  (r) = Recorded By, (t) = Taken By, (c) = Cosigned By    Initials Name Provider Type    ALICIA Noriega, PT Physical Therapist          Therapy Education  Education Details: HEP progression  Given: HEP, Symptoms/condition management  Program: Progressed  How Provided: Verbal, Demonstration, Written  Provided to: Patient  Level of Understanding: Verbalized, Demonstrated, Teach back  education performed    Outcome Measure Options: Disabilities of the Arm, Shoulder, and Hand (DASH)  DASH  Open a tight or new jar.: Moderate Difficulty  Write: Mild Difficulty  Turn a key: Mild Difficulty  Prepare a meal: Mild Difficulty  Push open a heavy door: Moderate Difficulty  Place an object on a shelf above your head: Moderate Difficulty  Do heavy household chores (e.g., wash walls, wash floors): Severe Difficulty  Garden or do yard work: Severe Difficulty  Make a bed: Mild Difficulty  Carry a shopping bag or briefcase: Moderate Difficulty  Carry a heavy object (over 10 lbs): Moderate Difficulty  Change a lightbulb overhead: Severe Difficulty  Wash or blow dry your hair: Moderate Difficulty  Wash your back: Severe Difficulty  Put on a pullover sweater: Moderate Difficulty  Use a knife to cut food: Mild Difficulty  Recreational activities in which require little effort (e.g., cardplaying, knitting, etc.): Moderate Difficulty  Recreational activities in which you take some force or impact through your arm, should or hand (e.g. golf, hammering, tennis, etc.): Severe Difficulty  Recreational Activities in which you move your arm freely (e.g., frisbee, badminton, etc.): Moderate Difficulty  Manage transportation needs (getting from one place to another): Mild Difficulty  Sexual Activities: No Difficulty  During the past week, to what extent has your arm, shoulder, or hand problem interfered with your normal social activites with family, friends, neighbors or groups?: Moderately  During the past week, were you limited in your work or other regular daily activities as a result of your arm, shoulder or hand problem?: Very limited  Arm, Shoulder, or hand pain: Mild  Arm, shoulder or hand pain when you performed any specific activity: Moderate  Tingling (pins and needles) in your arm, shoulder, or hand: Mild  Weakness in your arm, shoulder or hand: Moderate  Stiffness in your arm, shoulder or hand: None  During the  past week, how much difficulty have you had sleeping because of the pain in your arm, shoulder or hand?: Mild Difficulty  I feel less capable, less confident or less useful because of my arm, shoulder or hand problem: Agree  DASH Sum : 84  Number of Questions Answered: 30  DASH Score: 45         Time Calculation:   Start Time: 0845  Total Timed Code Minutes- PT: 55 minute(s)   Therapy Suggested Charges     Code   Minutes Charges    32085 (CPT®) Hc Pt Neuromusc Re Education Ea 15 Min      10600 (CPT®) Hc Pt Ther Proc Ea 15 Min 16 1    44546 (CPT®) Hc Gait Training Ea 15 Min      86565 (CPT®) Hc Pt Therapeutic Act Ea 15 Min      54089 (CPT®) Hc Pt Manual Therapy Ea 15 Min 55 4    15315 (CPT®) Hc Pt Ther Massage- Per 15 Min      97910 (CPT®) Hc Pt Iontophoresis Ea 15 Min      23421 (CPT®) Hc Pt Elec Stim Ea-Per 15 Min      77751 (CPT®) Hc Pt Ultrasound Ea 15 Min      92880 (CPT®) Hc Pt Self Care/Mgmt/Train Ea 15 Min      Total  71 5        Therapy Charges for Today     Code Description Service Date Service Provider Modifiers Qty    42812890447 HC PT THER PROC EA 15 MIN 7/30/2018 Jennifer Noriega, PT GP 1    17095222412 HC PT MANUAL THERAPY EA 15 MIN 7/30/2018 Jennifer Noriega, PT GP 4          PT G-Codes  Outcome Measure Options: Disabilities of the Arm, Shoulder, and Hand (DASH)         Jennifer Noriega, PT  7/30/2018

## 2018-07-31 ENCOUNTER — TELEPHONE (OUTPATIENT)
Dept: ONCOLOGY | Facility: CLINIC | Age: 48
End: 2018-07-31

## 2018-07-31 DIAGNOSIS — Z17.0 MALIGNANT NEOPLASM OF LEFT BREAST IN FEMALE, ESTROGEN RECEPTOR POSITIVE, UNSPECIFIED SITE OF BREAST (HCC): Primary | ICD-10-CM

## 2018-07-31 DIAGNOSIS — R10.10 PAIN OF UPPER ABDOMEN: ICD-10-CM

## 2018-07-31 DIAGNOSIS — C50.912 MALIGNANT NEOPLASM OF LEFT BREAST IN FEMALE, ESTROGEN RECEPTOR POSITIVE, UNSPECIFIED SITE OF BREAST (HCC): Primary | ICD-10-CM

## 2018-07-31 DIAGNOSIS — R74.8 ELEVATED ALKALINE PHOSPHATASE LEVEL: ICD-10-CM

## 2018-07-31 DIAGNOSIS — R79.89 ELEVATED LIVER FUNCTION TESTS: ICD-10-CM

## 2018-07-31 NOTE — TELEPHONE ENCOUNTER
Called patient to discuss labs/elevated LFT's and alk phos.  Unable to do CT's with contrast due to severe allergy to iodine.  Will try to get PET.

## 2018-08-02 ENCOUNTER — HOSPITAL ENCOUNTER (OUTPATIENT)
Dept: PHYSICAL THERAPY | Facility: HOSPITAL | Age: 48
Setting detail: THERAPIES SERIES
Discharge: HOME | End: 2018-08-02
Attending: SURGERY

## 2018-08-02 DIAGNOSIS — L90.5 SCAR CONDITIONS AND FIBROSIS OF SKIN: ICD-10-CM

## 2018-08-02 DIAGNOSIS — N64.4 MASTODYNIA OF LEFT BREAST: ICD-10-CM

## 2018-08-02 DIAGNOSIS — R68.89 IMPAIRED FUNCTION OF UPPER EXTREMITY: ICD-10-CM

## 2018-08-02 DIAGNOSIS — I89.0 LYMPHEDEMA OF RIGHT UPPER EXTREMITY: ICD-10-CM

## 2018-08-02 DIAGNOSIS — I97.2 POST-MASTECTOMY LYMPHEDEMA SYNDROME: Primary | ICD-10-CM

## 2018-08-02 PROCEDURE — 97140 MANUAL THERAPY 1/> REGIONS: CPT | Performed by: PHYSICAL THERAPIST

## 2018-08-02 NOTE — THERAPY TREATMENT NOTE
"    Outpatient Physical Therapy Lymphedema Treatment Note  [facility]     Patient Name: Tia Sommers  : 1970  MRN: [id number]  Today's Date: 2018        Visit Date: 2018    Visit Dx:    ICD-10-CM ICD-9-CM   1. 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 Lymphedema of right upper extremity I89.0 457.1   3. Scar conditions and fibrosis of skin L90.5 709.2   4. Mastodynia of left breast N64.4 611.71   5. Impaired function of upper extremity R68.89 V49.5       Patient Active Problem List   Diagnosis   • Breast cancer, left (CMS/HCC)              Lymphedema     Row Name 18 1300          Able to rate subjective pain? yes  -MW    Pre-Treatment Pain Level 0  -MW    Post-Treatment Pain Level 1  -MW    Subjective Pain Comment no pain at rest; tender in \"pockets\"   -MW          Subjective Comments reports aunt who also had recurrent BrCA passed away yesterday; having a hard time right now. Feeling better than Monday (less quesy) but hasn't eaten yet today. Has scan of abdomen next week Tuesday. Unsure of  arrangements; aunt lived about 6 [place] out [place] state.   -MW          Ptting Edema Category By severity  -MW    Pitting Edema Moderate;Mild  -MW    Edema Assessment Comment Mild to mod in RUE, and bilat lateral trunk; LUE mild proximally   -MW          Location/Assessment Upper Extremity;Upper Quadrant  -MW    Upper Extremity Conditions bilateral:;intact;clean;left:;other (comment)   LT multiple tatoos   -MW    Upper Extremity Color/Pigment bilateral:   WNL for ethnicity   -MW    Upper Quadrant Conditions bilateral:;intact  -MW    Upper Quadrant Color/Pigment bilateral:;other (comment)   WNL   -MW    Skin Observations Comment band aid over port site   -MW          Manual Lymphatic Drainage initial sequence;opened regional lymph nodes;opened anastamoses;extremity treatment;astym  -MW    Initial Sequence supraclavicular;shoulder collectors  -MW    Supraclavicular right;left  -MW "    Shoulder Collectors right;left  -MW    Abdomen --   deferred due to fullness/ bloating   -MW    Opened Regional Lymph Nodes inguinal  -MW    Inguinal right;left  -MW    Opened Anastamoses axillo-inguinal  -MW    Axillo-Inguinal right;left  -MW    Extremity Treatment MLD to full limb;simple/brief MLD  -MW    Simple/Brief MLD RUE with focus on upper arm; Rt & Lt lateral trunk fullness   -MW    Astym mastectomy protocol  -    Mastectomy Protocol supine;sitting  -MW    Mastectomy Protocol Comment Seated on stool with face craddle to focus on Rt & Lt upper back/ scapular area with evaluator and localizer; extra strokes to leveator scap, mid traps and teres mm groups. In supine: Evaluator and localizer to Rt and Lt chest, working around port on Lt. Mild to moderate soft tissue disruptions Rt chest, very mild Lt chest. Continued ASTYM into lateral trunk / ribs; mild soft tissue disruptions bilaterally. Repositioned in shoulder flex/ ABD to open axilla (partially able to achieve position) and work on pectoralis tightness, soft tissue restrictions with extra strokes with isolator Bilat chest. Isolator star burst around incision lines bilaterally.   -MW    Manual Lymphatic Drainage Comments MLD post ASTYM working around trunk with position changes.   -MW          Compression/Skin Care skin care;wrapping location;bandaging;compression garment  -    Skin Care moisturizing lotion applied   residual cocoa butter on trunk   -MW    Wrapping Location upper extremity  -MW    Wrapping Location UE right:;hand to axilla  -MW    Compression Garment Comments Assisted pt to don RUE arm sleeve  -      User Key  (r) = Recorded By, (t) = Taken By, (c) = Cosigned By    Initials Name Provider Type    Jennifer Solorio, PT Physical Therapist                              PT Assessment/Plan     Row Name 08/02/18 1300          PT Assessment    Functional Limitations Performance in self-care ADL;Limitation in home management;Performance  "in work activities  -MW     Impairments Edema;Impaired lymphatic circulation;Impaired postural alignment;Muscle strength;Pain;Range of motion;Sensation  -MW     Assessment Comments RUE lymphedema softer and smaller in forearm and upper arm. LUE also less full around shoulder. Lateral pectoralis area remains tight and tender bilaterally. Incision line mobility improving.   -MW     Rehab Potential Good  -MW     Patient/caregiver participated in establishment of treatment plan and goals Yes  -MW     Patient would benefit from skilled therapy intervention Yes  -MW        PT Plan    PT Frequency 2x/week   consider decreasing to 1 x wk   -MW     Physical Therapy Interventions (Optional Details) manual therapy techniques;manual lymphatic drainage;postural re-education;ROM (Range of Motion);stretching;strengthening;bandaging;home exercise program  -MW     PT Plan Comments Cont ASTYM/STM, MLD; compression garment; progress HEP   -MW       User Key  (r) = Recorded By, (t) = Taken By, (c) = Cosigned By    Initials Name Provider Type    MW Jennifer Noriega, PT Physical Therapist                     Exercises     Row Name 18 1300             Precautions    Existing Precautions/Restrictions other (see comments)   Lt subclavian port; current chemotherapy   -MW         Subjective Comments    Subjective Comments reports aunt who also had recurrent BrCA passed away yesterday; having a hard time right now. Feeling better than Monday (less quesy) but hasn't eaten yet today. Has scan of abdomen next week Tuesday. Unsure of  arrangements; aunt lived about 6 hours away out of state.   -MW         Subjective Pain    Able to rate subjective pain? yes  -MW      Pre-Treatment Pain Level 0  -MW      Post-Treatment Pain Level 1  -MW      Subjective Pain Comment no pain at rest; tender in \"pockets\"   -MW         Total Minutes    51515 - PT Manual Therapy Minutes 55  -MW         Exercise 1    Exercise Name 1 RT HBH wings   -MW      " Reps 1 10  -MW         Exercise 2    Exercise Name 2 LT HBH wings   -MW      Reps 2 8  -MW        User Key  (r) = Recorded By, (t) = Taken By, (c) = Cosigned By    Initials Name Provider Type    Jennifer Solorio PT Physical Therapist                       Manual Rx (last 36 hours)      Manual Treatments     Row Name 18 1300             Total Minutes    97389 - PT Manual Therapy Minutes 55  -MW         Manual Rx 1    Manual Rx 1 Location Bilat chest / mastectomy sites   -MW      Manual Rx 1 Type STM with tissue bending at lateral pectoralis border and at incision lines   -MW      Manual Rx 1 Grade gentle to moderate; Rt >> Lt   -MW      Manual Rx 1 Duration 20  -MW         Manual Rx 2    Manual Rx 2 Location LT & RT upper trap, mid trap, leveator scap and teres mm groups   -MW      Manual Rx 2 Type STM with tissue bending and lifting techniques   -MW      Manual Rx 2 Grade gentle to moderate   -MW      Manual Rx 2 Duration 15  -MW        User Key  (r) = Recorded By, (t) = Taken By, (c) = Cosigned By    Initials Name Provider Type    Jennifer Solorio PT Physical Therapist              Therapy Education  Education Details: Cont HEP as able; encouraged pt to consider all options for 5-6 hour drive for aunts  (any option to fly, or to stop over night part way, etc. to pace herself well).   Given: Symptoms/condition management  Program: Modified  How Provided: Verbal  Provided to: Patient  Level of Understanding: Verbalized              Time Calculation:   Start Time: 1300  Total Timed Code Minutes- PT: 55 minute(s)   Therapy Suggested Charges     Code   Minutes Charges    11237 (CPT®) Hc Pt Neuromusc Re Education Ea 15 Min      66469 (CPT®) Hc Pt Ther Proc Ea 15 Min      86115 (CPT®) Hc Gait Training Ea 15 Min      87391 (CPT®) Hc Pt Therapeutic Act Ea 15 Min      48427 (CPT®) Hc Pt Manual Therapy Ea 15 Min 55 4    66119 (CPT®) Hc Pt Ther Massage- Per 15 Min      21333 (CPT®) Hc Pt Iontophoresis Ea  15 Min      93617 (CPT®) Hc Pt Elec Stim Ea-Per 15 Min      95862 (CPT®) Hc Pt Ultrasound Ea 15 Min      64648 (CPT®) Hc Pt Self Care/Mgmt/Train Ea 15 Min      Total  55 4        Therapy Charges for Today     Code Description Service Date Service Provider Modifiers Qty    78451674322 HC PT MANUAL THERAPY EA 15 MIN 8/2/2018 Jennifer Noriega, PT GP 4                    Jennifer Noriega, PT  8/2/2018

## 2018-08-03 ENCOUNTER — TELEPHONE (OUTPATIENT)
Dept: ONCOLOGY | Facility: CLINIC | Age: 48
End: 2018-08-03

## 2018-08-03 NOTE — TELEPHONE ENCOUNTER
----- Message from Benjy Salcedo sent at 8/3/2018 10:55 AM EDT -----  Regarding: cancelled PET  Pt left me a voice mail., she has had a death in the family and will be going out of town.  She won't be here for her PET on Tuesday.  I called scheduling and cancelled for now.  Hopefully she will let us know when she gets back so I can r/s before she sees Paz MANCUSO

## 2018-08-07 ENCOUNTER — APPOINTMENT (OUTPATIENT)
Dept: PET IMAGING | Facility: HOSPITAL | Age: 48
End: 2018-08-07

## 2018-08-13 ENCOUNTER — HOSPITAL ENCOUNTER (OUTPATIENT)
Dept: PHYSICAL THERAPY | Facility: HOSPITAL | Age: 48
Setting detail: THERAPIES SERIES
Discharge: HOME | End: 2018-08-13
Attending: SURGERY

## 2018-08-13 ENCOUNTER — OFFICE VISIT (OUTPATIENT)
Dept: ONCOLOGY | Facility: CLINIC | Age: 48
End: 2018-08-13

## 2018-08-13 VITALS
HEIGHT: 66 IN | TEMPERATURE: 98.7 F | RESPIRATION RATE: 16 BRPM | SYSTOLIC BLOOD PRESSURE: 186 MMHG | HEART RATE: 83 BPM | DIASTOLIC BLOOD PRESSURE: 108 MMHG | WEIGHT: 170 LBS | BODY MASS INDEX: 27.32 KG/M2

## 2018-08-13 DIAGNOSIS — Z17.0 MALIGNANT NEOPLASM OF LEFT BREAST IN FEMALE, ESTROGEN RECEPTOR POSITIVE, UNSPECIFIED SITE OF BREAST (HCC): Primary | ICD-10-CM

## 2018-08-13 DIAGNOSIS — L90.5 SCAR CONDITIONS AND FIBROSIS OF SKIN: ICD-10-CM

## 2018-08-13 DIAGNOSIS — C50.912 MALIGNANT NEOPLASM OF LEFT BREAST IN FEMALE, ESTROGEN RECEPTOR POSITIVE, UNSPECIFIED SITE OF BREAST (HCC): Primary | ICD-10-CM

## 2018-08-13 DIAGNOSIS — I89.0 LYMPHEDEMA OF RIGHT UPPER EXTREMITY: ICD-10-CM

## 2018-08-13 DIAGNOSIS — N64.4 MASTODYNIA OF LEFT BREAST: ICD-10-CM

## 2018-08-13 DIAGNOSIS — I97.2 POST-MASTECTOMY LYMPHEDEMA SYNDROME: Primary | ICD-10-CM

## 2018-08-13 DIAGNOSIS — R68.89 IMPAIRED FUNCTION OF UPPER EXTREMITY: ICD-10-CM

## 2018-08-13 PROCEDURE — 97140 MANUAL THERAPY 1/> REGIONS: CPT | Performed by: PHYSICAL THERAPIST

## 2018-08-13 PROCEDURE — 99213 OFFICE O/P EST LOW 20 MIN: CPT | Performed by: INTERNAL MEDICINE

## 2018-08-13 NOTE — PROGRESS NOTES
CHIEF COMPLAINT: Showed a better  complaining of bilateral leg and ankle pains and swelling    Problem List:     Breast cancer, left (CMS/HCC)    5/16/2018 Initial Diagnosis     1.) Stage IIa [id number] Breast cancer, left: Had bilateral mastectomy on 5/16/18 with benign disease on the right.  The left breast had infiltrating lobular carcinoma, Bloom Gutierrez 6 out of 9, 2 foci largest being 4 cm, 4 total nodes and 2 sentinel nodes taken all negative.  There was a positive superficial margin.  Biopsy on 3/28/18 of abnormality found on screening mammogram was 95% 3+ positive ER and 95% 3+ positive VA and HER-2/taiwo 0+.  Baseline liver enzymes abnormal ALT 79 AST 55.    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  CHEK2 also causes an increased risk for colon cancer (up to 9.5% lifetime risk).           6/18/2018 -  Other Event     Oncotype score 28 with 18% 10 year risk of distant recurrence on tamoxifen alone.  There is about a 6% lower risk of distant recurrence when chemotherapy is added to the hormonal blockade per this prediction.  The Oncotype showed her to be ER positive and VA negative and HER-2/taiwo negative.         7/6/2018 -  Chemotherapy     OP BREAST TC DOCEtaxel / Cyclophosphamide              HISTORY OF PRESENT ILLNESS:  The patient is a 48 y.o. female, here for follow up on management of bilateral knee and ankle swelling with pain.  No cellulitis      Past Medical History:   Diagnosis Date   • Arthritis     minna knees and hands   • Breast cancer (CMS/HCC) 2000    dx in 1998;  2018   • Drug therapy 2000   • Hx of radiation therapy 2000   • Lupus 2014   • Lymphedema     right arm     Past Surgical History:   Procedure Laterality Date   • BONE BIOPSY      back   •  "BREAST BIOPSY Right    • BREAST LUMPECTOMY Right 2000   • COLONOSCOPY      > 10 years   • DIAGNOSTIC LAPAROSCOPY EXPLORATORY LAPAROTOMY     • ENDOSCOPY     • MASTECTOMY W/ SENTINEL NODE BIOPSY Bilateral 5/16/2018    Procedure: BREAST MASTECTOMY BILATERAL WITH LEFT SENTINEL NODE BIOPSY;  Surgeon: Keven Dye MD;  Location: [facility];  Service: General   • SPLENECTOMY     • VENOUS ACCESS DEVICE (PORT) INSERTION     • VENOUS ACCESS DEVICE (PORT) REMOVAL  2003       Allergies   Allergen Reactions   • Iodine Anaphylaxis   • Penicillins Anaphylaxis   • Latex Rash       Family History and Social History reviewed and changed as necessary      REVIEW OF SYSTEM:   Review of Systems   Constitutional: Negative for appetite change, chills, diaphoresis, fatigue, fever and unexpected weight change.   HENT:   Negative for mouth sores, sore throat and trouble swallowing.    Eyes: Negative for icterus.   Respiratory: Negative for cough, hemoptysis and shortness of breath.    Cardiovascular: Negative for chest pain, leg swelling and palpitations.   Gastrointestinal: Negative for abdominal distention, abdominal pain, blood in stool, constipation, diarrhea, nausea and vomiting.   Endocrine: Negative for hot flashes.   Genitourinary: Negative for bladder incontinence, difficulty urinating, dysuria, frequency and hematuria.    Musculoskeletal: Negative for gait problem, neck pain and neck stiffness.   Skin: Negative for rash.   Neurological: Negative for dizziness, gait problem, headaches, light-headedness and numbness.   Hematological: Negative for adenopathy. Does not bruise/bleed easily.   Psychiatric/Behavioral: Negative for depression. The patient is not nervous/anxious.    All other systems reviewed and are negative.       PHYSICAL EXAM    Vitals:    08/13/18 1503   BP: (!) 186/108   Pulse: 83   Resp: 16   Temp: 98.7 °F (37.1 °C)   Weight: 77.1 kg (170 lb)   Height: 167.6 cm (66\")     Constitutional: Appears well-developed and " well-nourished. No distress.   ECOG: (2) Ambulatory and capable of self care, unable to carry out work activity, up and about > 50% or waking hours  HENT:   Head: Normocephalic.   Mouth/Throat: Oropharynx is clear and moist.   Eyes: Conjunctivae are normal. Pupils are equal, round, and reactive to light. No scleral icterus.   Neck: Neck supple. No JVD present. No thyromegaly present.   Cardiovascular: Normal rate, regular rhythm and normal heart sounds.    Pulmonary/Chest: Breath sounds normal. No respiratory distress.   Abdominal: Soft. Exhibits no distension and no mass. There is no hepatosplenomegaly. There is no tenderness. There is no rebound and no guarding.   Musculoskeletal:Exhibits no edema, tenderness or deformity.   Neurological: Alert and oriented to person, place, and time. Exhibits normal muscle tone.   Skin: No ecchymosis, no petechiae and no rash noted. Not diaphoretic. No cyanosis. Nails show no clubbing.   Psychiatric: Normal mood and affect.   Vitals reviewed.      No radiology results for the last 7 days          ASSESSMENT & PLAN:    1.  Bilateral mild ankle swelling and mild knee swelling with no pretibial edema: I doubt that this is deep vein thrombosis but we'll get an ultrasound.  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  2. Hypertension asked her to follow-up with her primary care regarding uncontrolled hypertension later today      Chad Valle MD    08/13/2018

## 2018-08-13 NOTE — THERAPY TREATMENT NOTE
"    Outpatient Physical Therapy Lymphedema Treatment Note  [facility]     Patient Name: Tia Sommers  : 1970  MRN: [id number]  Today's Date: 2018        Visit Date: 2018    Visit Dx:    ICD-10-CM ICD-9-CM   1. 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 Lymphedema of right upper extremity I89.0 457.1   3. Scar conditions and fibrosis of skin L90.5 709.2   4. Mastodynia of left breast N64.4 611.71   5. Impaired function of upper extremity R68.89 V49.5       Patient Active Problem List   Diagnosis   • Breast cancer, left (CMS/HCC)              Lymphedema     Row Name 18 1300          Able to rate subjective pain? yes  -MW    Pre-Treatment Pain Level 1  -MW    Post-Treatment Pain Level 1  -MW    Subjective Pain Comment tender Lt > Rt in \"pockets\" and Lt scapula (lower trap)   -MW          Subjective Comments reports drove to [place], then took \"small tour bus\" to [place] for aunts . Got back to [place] this morning, but was in [place] over the weekend with her dad. Notes legs swollen LT >>Rt; Lt more swollen and goes up past the knee. Also sister noticed \"bruises\" on Rt hip.   -MW          Ptting Edema Category By severity  -MW    Pitting Edema Moderate  -MW    Edema Assessment Comment Moderate RUE, Rt lateral trunk. Lt lateral trunk to mild in ant lower ribs. BLE with moderate pitting edema, Lt extends prox to knee. venous congestion in superficial vessels.   -MW          Location/Assessment Upper Extremity;Upper Quadrant  -MW    Upper Extremity Conditions bilateral:;intact;clean;left:;other (comment)   LT multiple tatoos   -MW    Upper Extremity Color/Pigment bilateral:   WNL for ethnicity   -MW    Upper Quadrant Conditions bilateral:;intact  -MW    Upper Quadrant Color/Pigment bilateral:;other (comment)   WNL   -MW          Manual Lymphatic Drainage initial sequence;opened regional lymph nodes;opened anastamoses;extremity treatment;astym  -MW    Initial Sequence supraclavicular;shoulder " collectors  -MW    Supraclavicular right;left  -MW    Shoulder Collectors right;left  -MW    Abdomen --   deferred due to fullness/ bloating   -MW    Opened Regional Lymph Nodes inguinal  -MW    Inguinal right;left  -MW    Opened Anastamoses axillo-inguinal  -MW    Axillo-Inguinal right;left  -MW    Extremity Treatment MLD to full limb;simple/brief MLD  -MW    Simple/Brief MLD Lt upper arm / shoulder after clearing of lateral trunk and ribs.   -MW    MLD to Full Limb RUE; fullness in upper arm and forearm.   -MW    Astym mastectomy protocol  -    Mastectomy Protocol supine;sitting  -    Mastectomy Protocol Comment Seated on stool with upper body resting on face craddle to focus on Rt & Lt upper back/ scapular area with evaluator and localizer; extra strokes to leveator scap, mid traps and teres mm groups. Continued strokes into lateral trunk adjacent to breast area. In supine: Evaluator and localizer to Rt and Lt chest, working around port on Lt. Mild to moderate soft tissue disruptions Rt chest, very mild Lt chest. Continued ASTYM into lateral trunk / ribs; mild soft tissue disruptions bilaterally. Repositioned in shoulder flex/ ABD to open axilla (partially able to achieve position) and work on pectoralis tightness, soft tissue restrictions with extra strokes with isolator Bilat chest. Isolator star burst around incision lines bilaterally.   -MW    Manual Lymphatic Drainage Comments MLD post ASTYM working around trunk with position changes.   -MW          Compression/Skin Care skin care;wrapping location;bandaging;compression garment  -    Skin Care moisturizing lotion applied   residual cocoa butter on trunk   -MW    Wrapping Location upper extremity  -    Wrapping Location UE right:;hand to axilla  -    Compression Garment Comments Assisted pt to don RUE arm sleeve  -      User Key  (r) = Recorded By, (t) = Taken By, (c) = Cosigned By    Initials Name Provider Type    Jennifer Solorio, PT  "Physical Therapist                              PT Assessment/Plan     Row Name 18 1300          Functional Limitations Performance in self-care ADL;Limitation in home management;Performance in work activities  -    Impairments Edema;Impaired lymphatic circulation;Impaired postural alignment;Muscle strength;Pain;Range of motion;Sensation  -    Assessment Comments Pt returns after week out of town for . Mild to moderate increase in RUE lymphedema, and increased swelling in bilateral lateral trunk/ ribs areas. Also noted increased fullness Lt anterior ribs inferior to incision line; tender as well. Fullness decreased post CDT-MLD. Soft tissue continues to loosen and smooth with manual techniques. Standing shoulder flexion is considerably greater than supine; need to focus on mobility at glenohumeral joint as well as overall soft tissue mobility for function and positioning for XRT.   -MW    Rehab Potential Good  -MW    Patient/caregiver participated in establishment of treatment plan and goals Yes  -MW    Patient would benefit from skilled therapy intervention Yes  -MW          PT Frequency 2x/week  -MW    Physical Therapy Interventions (Optional Details) manual therapy techniques;manual lymphatic drainage;postural re-education;ROM (Range of Motion);stretching;strengthening;bandaging;home exercise program  -    PT Plan Comments Cont ASTYM/STM, MLD; compression garment; progress HEP   -      User Key  (r) = Recorded By, (t) = Taken By, (c) = Cosigned By    Initials Name Provider Type    Jennifer Solorio, PT Physical Therapist                     Exercises     Row Name 18 1300             Subjective Comments    Subjective Comments reports drove to [place], then took \"small tour bus\" to [place] for aunts . Got back to [facility] this morning, but was in [place] over the weekend with her dad. Notes legs swollen LT >>Rt; Lt more swollen and goes up past the knee. Also sister noticed \"bruises\" on Rt hip.   " "-MW         Subjective Pain    Able to rate subjective pain? yes  -MW      Pre-Treatment Pain Level 1  -MW      Post-Treatment Pain Level 1  -MW      Subjective Pain Comment tender Lt > Rt in \"pockets\" and Lt scapula (lower trap)   -MW         Total Minutes    03201 - PT Manual Therapy Minutes 85  -MW        User Key  (r) = Recorded By, (t) = Taken By, (c) = Cosigned By    Initials Name Provider Type    Jennifer Solorio, PT Physical Therapist                       Manual Rx (last 36 hours)      Manual Treatments     Row Name 08/13/18 1300             Total Minutes    72394 - PT Manual Therapy Minutes 85  -MW         Manual Rx 1    Manual Rx 1 Location Bilat chest / mastectomy sites   -MW      Manual Rx 1 Type STM with tissue bending at lateral pectoralis border and at incision lines   -MW      Manual Rx 1 Grade gentle to moderate; Rt >> Lt   -MW      Manual Rx 1 Duration 25  -MW         Manual Rx 2    Manual Rx 2 Location LT & RT upper trap, mid trap, leveator scap and teres mm groups   -MW      Manual Rx 2 Type STM with tissue bending and lifting techniques   -MW      Manual Rx 2 Grade gentle to moderate   -MW      Manual Rx 2 Duration 25  -MW        User Key  (r) = Recorded By, (t) = Taken By, (c) = Cosigned By    Initials Name Provider Type    Jennifer Solorio, PT Physical Therapist                             Time Calculation:   Start Time: 1305  Total Timed Code Minutes- PT: 85 minute(s)   Therapy Suggested Charges     Code   Minutes Charges    60503 (CPT®)  Pt Neuromusc Re Education Ea 15 Min      17141 (CPT®) Hc Pt Ther Proc Ea 15 Min      52719 (CPT®) Hc Gait Training Ea 15 Min      60189 (CPT®) Hc Pt Therapeutic Act Ea 15 Min      19501 (CPT®) Hc Pt Manual Therapy Ea 15 Min 85 6    88424 (CPT®) Hc Pt Ther Massage- Per 15 Min      69964 (CPT®) Hc Pt Iontophoresis Ea 15 Min      41606 (CPT®) Hc Pt Elec Stim Ea-Per 15 Min      77811 (CPT®) Hc Pt Ultrasound Ea 15 Min      88484 (CPT®) Hc Pt Self " Care/Mgmt/Train Ea 15 Min      Total  85 6        Therapy Charges for Today     Code Description Service Date Service Provider Modifiers Qty    03933756578 HC PT MANUAL THERAPY EA 15 MIN 8/13/2018 Jennifer Noriega, PT GP 6                    Jennifer Noriega, PT  8/13/2018

## 2018-08-14 ENCOUNTER — INFUSION (OUTPATIENT)
Dept: ONCOLOGY | Facility: HOSPITAL | Age: 48
End: 2018-08-14

## 2018-08-14 VITALS
HEART RATE: 96 BPM | SYSTOLIC BLOOD PRESSURE: 171 MMHG | RESPIRATION RATE: 16 BRPM | BODY MASS INDEX: 27.16 KG/M2 | WEIGHT: 169 LBS | DIASTOLIC BLOOD PRESSURE: 114 MMHG | HEIGHT: 66 IN | TEMPERATURE: 97.6 F

## 2018-08-14 DIAGNOSIS — C50.912 MALIGNANT NEOPLASM OF LEFT BREAST IN FEMALE, ESTROGEN RECEPTOR POSITIVE, UNSPECIFIED SITE OF BREAST (HCC): Primary | ICD-10-CM

## 2018-08-14 DIAGNOSIS — Z17.0 MALIGNANT NEOPLASM OF LEFT BREAST IN FEMALE, ESTROGEN RECEPTOR POSITIVE, UNSPECIFIED SITE OF BREAST (HCC): Primary | ICD-10-CM

## 2018-08-14 PROCEDURE — 25010000002 HEPARIN FLUSH (PORCINE) 100 UNIT/ML SOLUTION: Performed by: INTERNAL MEDICINE

## 2018-08-14 PROCEDURE — 96523 IRRIG DRUG DELIVERY DEVICE: CPT

## 2018-08-14 RX ORDER — SODIUM CHLORIDE 0.9 % (FLUSH) 0.9 %
10 SYRINGE (ML) INJECTION AS NEEDED
Status: CANCELLED | OUTPATIENT
Start: 2018-08-14

## 2018-08-14 RX ORDER — SODIUM CHLORIDE 0.9 % (FLUSH) 0.9 %
10 SYRINGE (ML) INJECTION AS NEEDED
Status: DISCONTINUED | OUTPATIENT
Start: 2018-08-14 | End: 2018-08-14 | Stop reason: HOSPADM

## 2018-08-14 RX ADMIN — HEPARIN 500 UNITS: 100 SYRINGE at 08:57

## 2018-08-14 RX ADMIN — HEPARIN 500 UNITS: 100 SYRINGE at 10:18

## 2018-08-14 RX ADMIN — Medication 10 ML: at 08:57

## 2018-08-15 ENCOUNTER — HOSPITAL ENCOUNTER (OUTPATIENT)
Dept: PET IMAGING | Facility: HOSPITAL | Age: 48
Discharge: HOME | End: 2018-08-15

## 2018-08-15 ENCOUNTER — HOSPITAL ENCOUNTER (OUTPATIENT)
Dept: PET IMAGING | Facility: HOSPITAL | Age: 48
Discharge: HOME | End: 2018-08-15
Admitting: NURSE PRACTITIONER

## 2018-08-15 ENCOUNTER — INFUSION (OUTPATIENT)
Dept: ONCOLOGY | Facility: HOSPITAL | Age: 48
End: 2018-08-15

## 2018-08-15 VITALS
BODY MASS INDEX: 27.21 KG/M2 | HEART RATE: 98 BPM | DIASTOLIC BLOOD PRESSURE: 108 MMHG | HEIGHT: 66 IN | RESPIRATION RATE: 18 BRPM | WEIGHT: 169.3 LBS | SYSTOLIC BLOOD PRESSURE: 170 MMHG | TEMPERATURE: 97.8 F

## 2018-08-15 DIAGNOSIS — R74.8 ELEVATED ALKALINE PHOSPHATASE LEVEL: ICD-10-CM

## 2018-08-15 DIAGNOSIS — Z17.0 MALIGNANT NEOPLASM OF LEFT BREAST IN FEMALE, ESTROGEN RECEPTOR POSITIVE, UNSPECIFIED SITE OF BREAST (HCC): ICD-10-CM

## 2018-08-15 DIAGNOSIS — Z17.0 MALIGNANT NEOPLASM OF LEFT BREAST IN FEMALE, ESTROGEN RECEPTOR POSITIVE, UNSPECIFIED SITE OF BREAST (HCC): Primary | ICD-10-CM

## 2018-08-15 DIAGNOSIS — R79.89 ELEVATED LIVER FUNCTION TESTS: ICD-10-CM

## 2018-08-15 DIAGNOSIS — C50.912 MALIGNANT NEOPLASM OF LEFT BREAST IN FEMALE, ESTROGEN RECEPTOR POSITIVE, UNSPECIFIED SITE OF BREAST (HCC): ICD-10-CM

## 2018-08-15 DIAGNOSIS — R10.10 PAIN OF UPPER ABDOMEN: ICD-10-CM

## 2018-08-15 DIAGNOSIS — C50.912 MALIGNANT NEOPLASM OF LEFT BREAST IN FEMALE, ESTROGEN RECEPTOR POSITIVE, UNSPECIFIED SITE OF BREAST (HCC): Primary | ICD-10-CM

## 2018-08-15 LAB — GLUCOSE BLDC GLUCOMTR-MCNC: 86 MG/DL (ref 70–130)

## 2018-08-15 PROCEDURE — 96523 IRRIG DRUG DELIVERY DEVICE: CPT

## 2018-08-15 PROCEDURE — 78815 PET IMAGE W/CT SKULL-THIGH: CPT

## 2018-08-15 PROCEDURE — 25010000002 HEPARIN FLUSH (PORCINE) 100 UNIT/ML SOLUTION: Performed by: INTERNAL MEDICINE

## 2018-08-15 PROCEDURE — 0 FLUDEOXYGLUCOSE F18 SOLUTION: Performed by: NURSE PRACTITIONER

## 2018-08-15 PROCEDURE — 82962 GLUCOSE BLOOD TEST: CPT

## 2018-08-15 PROCEDURE — A9552 F18 FDG: HCPCS | Performed by: NURSE PRACTITIONER

## 2018-08-15 RX ORDER — SODIUM CHLORIDE 0.9 % (FLUSH) 0.9 %
10 SYRINGE (ML) INJECTION AS NEEDED
Status: DISCONTINUED | OUTPATIENT
Start: 2018-08-15 | End: 2018-08-15 | Stop reason: HOSPADM

## 2018-08-15 RX ORDER — SODIUM CHLORIDE 0.9 % (FLUSH) 0.9 %
10 SYRINGE (ML) INJECTION AS NEEDED
Status: CANCELLED | OUTPATIENT
Start: 2018-08-15

## 2018-08-15 RX ADMIN — Medication 10 ML: at 10:00

## 2018-08-15 RX ADMIN — FLUDEOXYGLUCOSE F18 1 DOSE: 300 INJECTION INTRAVENOUS at 08:24

## 2018-08-15 RX ADMIN — HEPARIN 500 UNITS: 100 SYRINGE at 10:00

## 2018-08-16 ENCOUNTER — HOSPITAL ENCOUNTER (OUTPATIENT)
Dept: PHYSICAL THERAPY | Facility: HOSPITAL | Age: 48
Setting detail: THERAPIES SERIES
Discharge: HOME | End: 2018-08-16
Attending: SURGERY

## 2018-08-16 DIAGNOSIS — R68.89 IMPAIRED FUNCTION OF UPPER EXTREMITY: ICD-10-CM

## 2018-08-16 DIAGNOSIS — I89.0 LYMPHEDEMA OF RIGHT UPPER EXTREMITY: ICD-10-CM

## 2018-08-16 DIAGNOSIS — N64.4 MASTODYNIA OF LEFT BREAST: ICD-10-CM

## 2018-08-16 DIAGNOSIS — I97.2 POST-MASTECTOMY LYMPHEDEMA SYNDROME: Primary | ICD-10-CM

## 2018-08-16 DIAGNOSIS — L90.5 SCAR CONDITIONS AND FIBROSIS OF SKIN: ICD-10-CM

## 2018-08-16 PROCEDURE — 97140 MANUAL THERAPY 1/> REGIONS: CPT | Performed by: PHYSICAL THERAPIST

## 2018-08-16 NOTE — THERAPY TREATMENT NOTE
"    Outpatient Physical Therapy Lymphedema Treatment Note  [facility]     Patient Name: Tia Sommers  : 1970  MRN: [id number]  Today's Date: 2018        Visit Date: 2018    Visit Dx:    ICD-10-CM ICD-9-CM   1. 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 Lymphedema of right upper extremity I89.0 457.1   3. Scar conditions and fibrosis of skin L90.5 709.2   4. Mastodynia of left breast N64.4 611.71   5. Impaired function of upper extremity R68.89 V49.5       Patient Active Problem List   Diagnosis   • Breast cancer, left (CMS/HCC)              Lymphedema     Row Name 18 1300          Able to rate subjective pain? yes  -MW    Pre-Treatment Pain Level 1  -MW    Post-Treatment Pain Level 1  -MW    Subjective Pain Comment Tender in the \"pockets\"   -MW          Subjective Comments Reports had a scan from head to pelvis; hasn't heard any results. Nervous about scans and next chemo tx tomorrow.   -MW          Ptting Edema Category By severity  -MW    Pitting Edema Moderate;Mild  -MW    Edema Assessment Comment RUE mild to moderate; Lt lateral trunk / axilla mod   -MW          Location/Assessment Upper Extremity;Upper Quadrant  -MW    Upper Extremity Conditions bilateral:;intact;clean;left:;other (comment)   LT multiple tatoos   -MW    Upper Extremity Color/Pigment bilateral:   WNL for ethnicity   -MW    Upper Quadrant Conditions bilateral:;intact  -MW    Upper Quadrant Color/Pigment bilateral:;other (comment)   WNL   -MW    Skin Observations Comment band aid over port site   -MW          Manual Lymphatic Drainage initial sequence;opened regional lymph nodes;opened anastamoses;extremity treatment;astym  -MW    Initial Sequence supraclavicular;shoulder collectors  -MW    Supraclavicular right;left  -MW    Shoulder Collectors right;left  -MW    Abdomen --   deferred due to fullness/ bloating   -MW    Opened Regional Lymph Nodes inguinal  -MW    Inguinal right;left  -MW    Opened " Anastamoses axillo-inguinal  -MW    Axillo-Inguinal right;left  -MW    Extremity Treatment simple/brief MLD;MLD to proximal limb only  -MW    Simple/Brief MLD RUE, Rt and LT lateral trunk   -MW    MLD to Proximal Limb Only LUE  -MW    Astym mastectomy protocol  -    Mastectomy Protocol supine;sitting  -    Mastectomy Protocol Comment Seated on stool with upper body resting on face craddle to focus on Rt & Lt upper back/ scapular area with evaluator and localizer; extra strokes to leveator scap, mid traps and teres mm groups. Continued strokes into lateral trunk adjacent to breast area. In supine: Evaluator and localizer to Rt and Lt chest, working around port on Lt. Mild to moderate soft tissue disruptions Rt chest, very mild Lt chest. Continued ASTYM into lateral trunk / ribs; mild soft tissue disruptions bilaterally. Repositioned in shoulder flex/ ABD to open axilla (partially able to achieve position) and work on pectoralis tightness, soft tissue restrictions with extra strokes with isolator Bilat chest. Isolator star burst around incision lines bilaterally.   -MW    Manual Lymphatic Drainage Comments MLD mixed with STM post ASTYM working around trunk with position changes.   -MW          Compression/Skin Care skin care;wrapping location;bandaging;compression garment  -    Skin Care moisturizing lotion applied   residual cocoa butter on trunk   -MW    Wrapping Location upper extremity  -    Wrapping Location UE right:;hand to axilla  -MW    Compression Garment Comments Assisted pt to don RUE arm sleeve  -      User Key  (r) = Recorded By, (t) = Taken By, (c) = Cosigned By    Initials Name Provider Type    MW Jennifer Noriega, PT Physical Therapist                              PT Assessment/Plan     Row Name 08/16/18 1300          PT Assessment    Functional Limitations Performance in self-care ADL;Limitation in home management;Performance in work activities  -     Impairments Edema;Impaired lymphatic  "circulation;Impaired postural alignment;Muscle strength;Pain;Range of motion;Sensation  -     Assessment Comments Pt continues to make gradual improvements in soft tissue flexibility and mobility but continues to have ROM deficits. Mastectomy incision lines less adherent to chest wall, but remains tender in lateral puckered / more restricted area which limits STM to the area. Expect RUE lymphedema to be better managed with home lymph pump.   -MW     Rehab Potential Good  -MW     Patient/caregiver participated in establishment of treatment plan and goals Yes  -MW     Patient would benefit from skilled therapy intervention Yes  -MW        PT Plan    PT Frequency 2x/week  -MW     Physical Therapy Interventions (Optional Details) manual therapy techniques;manual lymphatic drainage;postural re-education;ROM (Range of Motion);stretching;strengthening;bandaging;home exercise program  -     PT Plan Comments Cont ASTYM/STM, MLD; compression garment; progress HEP; monitor Lt lateral trunk fullness   -MW       User Key  (r) = Recorded By, (t) = Taken By, (c) = Cosigned By    Initials Name Provider Type    MW Jennifer Noriega, PT Physical Therapist                     Exercises     Row Name 08/16/18 1300             Subjective Comments    Subjective Comments Reports had a scan from head to pelvis; hasn't heard any results. Nervous about scans and next chemo tx tomorrow.   -MW         Subjective Pain    Able to rate subjective pain? yes  -MW      Pre-Treatment Pain Level 1  -MW      Post-Treatment Pain Level 1  -MW      Subjective Pain Comment Tender in the \"pockets\"   -MW         Total Minutes    93392 - PT Therapeutic Exercise Minutes 5  -MW      23978 - PT Manual Therapy Minutes 55  -MW         Exercise 1    Exercise Name 1 RT [facility] wings   -MW      Reps 1 6  -MW         Exercise 2    Exercise Name 2 LT [facility] wings   -MW      Reps 2 6  -MW      Additional Comments very tender   -MW        User Key  (r) = Recorded By, (t) = " Taken By, (c) = Cosigned By    Initials Name Provider Type    Jennifer Solorio PT Physical Therapist                       Manual Rx (last 36 hours)      Manual Treatments     Row Name 08/16/18 1300             Total Minutes    07438 - PT Manual Therapy Minutes 55  -MW         Manual Rx 1    Manual Rx 1 Location Bilat chest / mastectomy sites   -MW      Manual Rx 1 Type STM with tissue bending at lateral pectoralis border and at incision lines   -MW      Manual Rx 1 Grade gentle to moderate  -MW      Manual Rx 1 Duration 20  -MW         Manual Rx 2    Manual Rx 2 Location LT & RT upper trap, mid trap, leveator scap and teres mm groups   -MW      Manual Rx 2 Type STM with tissue bending and lifting techniques   -MW      Manual Rx 2 Grade gentle to moderate   -MW      Manual Rx 2 Duration 20  -MW        User Key  (r) = Recorded By, (t) = Taken By, (c) = Cosigned By    Initials Name Provider Type    Jennifer Solorio PT Physical Therapist              Therapy Education  Education Details: Monitor LT lateral trunk fullness especially after lymph pump use on RUE; use pump on LUE if lateral trunk seems more full.   Given: Edema management, Symptoms/condition management  Program: Modified  How Provided: Verbal  Provided to: Patient  Level of Understanding: Verbalized              Time Calculation:   Start Time: 1300  Total Timed Code Minutes- PT: 60 minute(s)   Therapy Suggested Charges     Code   Minutes Charges    53455 (CPT®) Hc Pt Neuromusc Re Education Ea 15 Min      52423 (CPT®) Hc Pt Ther Proc Ea 15 Min 5     95420 (CPT®) Hc Gait Training Ea 15 Min      96507 (CPT®) Hc Pt Therapeutic Act Ea 15 Min      67621 (CPT®) Hc Pt Manual Therapy Ea 15 Min 55 4    36906 (CPT®) Hc Pt Ther Massage- Per 15 Min      50933 (CPT®) Hc Pt Iontophoresis Ea 15 Min      20722 (CPT®) Hc Pt Elec Stim Ea-Per 15 Min      80973 (CPT®) Hc Pt Ultrasound Ea 15 Min      99167 (CPT®) Hc Pt Self Care/Mgmt/Train Ea 15 Min      65543 (CPT®)  Hc Pt Prosthetic (S) Train Initial Encounter, Each 15 Min      87287 (CPT®) Hc Orthotic(S) Mgmt/Train Initial Encounter, Each 15min      30624 (CPT®) Hc Pt Aquatic Therapy Ea 15 Min      27401 (CPT®) Hc Pt Orthotic(S)/Prosthetic(S) Encounter, Each 15 Min      Total  60 4        Therapy Charges for Today     Code Description Service Date Service Provider Modifiers Qty    94380749535 HC PT MANUAL THERAPY EA 15 MIN 8/16/2018 Jennifer Noriega, PT GP 4                    Jennifer Noriega, PT  8/16/2018

## 2018-08-17 ENCOUNTER — INFUSION (OUTPATIENT)
Dept: ONCOLOGY | Facility: HOSPITAL | Age: 48
End: 2018-08-17

## 2018-08-17 ENCOUNTER — OFFICE VISIT (OUTPATIENT)
Dept: ONCOLOGY | Facility: CLINIC | Age: 48
End: 2018-08-17

## 2018-08-17 VITALS
DIASTOLIC BLOOD PRESSURE: 102 MMHG | BODY MASS INDEX: 27.16 KG/M2 | SYSTOLIC BLOOD PRESSURE: 179 MMHG | HEART RATE: 97 BPM | TEMPERATURE: 97.4 F | WEIGHT: 169 LBS | RESPIRATION RATE: 16 BRPM | HEIGHT: 66 IN

## 2018-08-17 DIAGNOSIS — R79.89 ELEVATED LFTS: ICD-10-CM

## 2018-08-17 DIAGNOSIS — C50.912 MALIGNANT NEOPLASM OF LEFT BREAST IN FEMALE, ESTROGEN RECEPTOR POSITIVE, UNSPECIFIED SITE OF BREAST (HCC): Primary | ICD-10-CM

## 2018-08-17 DIAGNOSIS — Z17.0 MALIGNANT NEOPLASM OF LEFT BREAST IN FEMALE, ESTROGEN RECEPTOR POSITIVE, UNSPECIFIED SITE OF BREAST (HCC): Primary | ICD-10-CM

## 2018-08-17 DIAGNOSIS — R10.13 EPIGASTRIC PAIN: ICD-10-CM

## 2018-08-17 LAB
ALBUMIN SERPL-MCNC: 3.88 G/DL (ref 3.2–4.8)
ALBUMIN/GLOB SERPL: 1.3 G/DL (ref 1.5–2.5)
ALP SERPL-CCNC: 117 U/L (ref 25–100)
ALT SERPL W P-5'-P-CCNC: 43 U/L (ref 7–40)
ANION GAP SERPL CALCULATED.3IONS-SCNC: 5 MMOL/L (ref 3–11)
AST SERPL-CCNC: 27 U/L (ref 0–33)
BILIRUB SERPL-MCNC: 0.7 MG/DL (ref 0.3–1.2)
BUN BLD-MCNC: 9 MG/DL (ref 9–23)
BUN/CREAT SERPL: 12.3 (ref 7–25)
CALCIUM SPEC-SCNC: 8.8 MG/DL (ref 8.7–10.4)
CHLORIDE SERPL-SCNC: 105 MMOL/L (ref 99–109)
CO2 SERPL-SCNC: 29 MMOL/L (ref 20–31)
CREAT BLD-MCNC: 0.73 MG/DL (ref 0.6–1.3)
ERYTHROCYTE [DISTWIDTH] IN BLOOD BY AUTOMATED COUNT: 16.9 % (ref 11.3–14.5)
GFR SERPL CREATININE-BSD FRML MDRD: 102 ML/MIN/1.73
GFR SERPL CREATININE-BSD FRML MDRD: 84 ML/MIN/1.73
GLOBULIN UR ELPH-MCNC: 3 GM/DL
GLUCOSE BLD-MCNC: 94 MG/DL (ref 70–100)
HCT VFR BLD AUTO: 36.3 % (ref 34.5–44)
HGB BLD-MCNC: 11 G/DL (ref 11.5–15.5)
LYMPHOCYTES # BLD AUTO: 2.4 10*3/MM3 (ref 0.6–4.8)
LYMPHOCYTES NFR BLD AUTO: 48 % (ref 24–44)
MCH RBC QN AUTO: 26.1 PG (ref 27–31)
MCHC RBC AUTO-ENTMCNC: 30.5 G/DL (ref 32–36)
MCV RBC AUTO: 85.6 FL (ref 80–99)
MONOCYTES # BLD AUTO: 0.5 10*3/MM3 (ref 0–1)
MONOCYTES NFR BLD AUTO: 10 % (ref 0–12)
NEUTROPHILS # BLD AUTO: 2.1 10*3/MM3 (ref 1.5–8.3)
NEUTROPHILS NFR BLD AUTO: 42 % (ref 41–71)
PLATELET # BLD AUTO: 407 10*3/MM3 (ref 150–450)
PMV BLD AUTO: 7 FL (ref 6–12)
POTASSIUM BLD-SCNC: 4.1 MMOL/L (ref 3.5–5.5)
PROT SERPL-MCNC: 6.9 G/DL (ref 5.7–8.2)
RBC # BLD AUTO: 4.24 10*6/MM3 (ref 3.89–5.14)
SODIUM BLD-SCNC: 139 MMOL/L (ref 132–146)
WBC NRBC COR # BLD: 5.1 10*3/MM3 (ref 3.5–10.8)

## 2018-08-17 PROCEDURE — 25010000002 HEPARIN FLUSH (PORCINE) 100 UNIT/ML SOLUTION: Performed by: INTERNAL MEDICINE

## 2018-08-17 PROCEDURE — 36591 DRAW BLOOD OFF VENOUS DEVICE: CPT

## 2018-08-17 PROCEDURE — 99214 OFFICE O/P EST MOD 30 MIN: CPT | Performed by: NURSE PRACTITIONER

## 2018-08-17 PROCEDURE — 85025 COMPLETE CBC W/AUTO DIFF WBC: CPT

## 2018-08-17 PROCEDURE — 80053 COMPREHEN METABOLIC PANEL: CPT

## 2018-08-17 RX ORDER — SODIUM CHLORIDE 0.9 % (FLUSH) 0.9 %
10 SYRINGE (ML) INJECTION AS NEEDED
Status: CANCELLED | OUTPATIENT
Start: 2018-08-17

## 2018-08-17 RX ADMIN — HEPARIN 500 UNITS: 100 SYRINGE at 11:07

## 2018-08-17 NOTE — PROGRESS NOTES
CHIEF COMPLAINT: 1.  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  Abdominal pain                                       3.  Follow-up for left breast cancer                                       4.  Lower extremity swelling    Problem List:     Breast cancer, left (CMS/HCC)    5/16/2018 Initial Diagnosis     1.) Stage IIa [id number] Breast cancer, left: Had bilateral mastectomy on 5/16/18 with benign disease on the right.  The left breast had infiltrating lobular carcinoma, Bloom Gutierrez 6 out of 9, 2 foci largest being 4 cm, 4 total nodes and 2 sentinel nodes taken all negative.  There was a positive superficial margin.  Biopsy on 3/28/18 of abnormality found on screening mammogram was 95% 3+ positive ER and 95% 3+ positive KY and HER-2/taiwo 0+.  Baseline liver enzymes abnormal ALT 79 AST 55.    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  CHEK2 also causes an increased risk for colon cancer (up to 9.5% lifetime risk).           6/18/2018 -  Other Event     Oncotype score 28 with 18% 10 year risk of distant recurrence on tamoxifen alone.  There is about a 6% lower risk of distant recurrence when chemotherapy is added to the hormonal blockade per this prediction.  The Oncotype showed her to be ER positive and KY negative and HER-2/taiwo negative.         7/6/2018 -  Chemotherapy     OP BREAST TC DOCEtaxel / Cyclophosphamide           8/14/2018 Imaging     Bilateral Lower Extremity Venous Doppler study negative for DVT.         8/15/2018 Imaging     PET/CT IMPRESSION:  1. Shotty nodes in the anterior mediastinal fat, weakly hypermetabolic,  and of only questionable significance. Imaging follow-up is suggested to  evaluate for  stability.  2. Expected activity at the patient's left-sided port injection site.  Otherwise negative PET scan.          2.  Genital Herpes Simplex    HISTORY OF PRESENT ILLNESS:  The patient is a 48 y.o. female, here for follow up on management of left breast cancer.  Continues to have upper abdominal pain, states that she has been having this abdominal pain since her mastectomy.  The pain is present at all times, and antacids do not improve it, sometimes after she eats she will have mild relief but this does not last long.  Does have some constipation, but this is mild.  Has had 2 prior CT scans that were both negative, the first was on June 7, 2018 and more recently on July 11, 2018.  She's had no fevers or chills, she denies any mouth sores.  Her examinations were elevated with her last treatment, ALT was 218 and her AST was up to 127 with alkaline phosphatase of 141.  She had PET/CT on 8/15/2018 and she is here today to go over those results and for further treatment planning.  She also stop by our office earlier this week with concerns over bilateral lower extremity swelling.  Venous Doppler study was done of both legs and these were negative for DVT.      Past Medical History:   Diagnosis Date   • Arthritis     minna knees and hands   • Breast cancer (CMS/HCC) 2000    dx in 1998;  2018   • Drug therapy 2000   • Hx of radiation therapy 2000   • Lupus 2014   • Lymphedema     right arm     Past Surgical History:   Procedure Laterality Date   • BONE BIOPSY      back   • BREAST BIOPSY Right    • BREAST LUMPECTOMY Right 2000   • COLONOSCOPY      > 10 years   • DIAGNOSTIC LAPAROSCOPY EXPLORATORY LAPAROTOMY     • ENDOSCOPY     • MASTECTOMY W/ SENTINEL NODE BIOPSY Bilateral 5/16/2018    Procedure: BREAST MASTECTOMY BILATERAL WITH LEFT SENTINEL NODE BIOPSY;  Surgeon: Keven Dye MD;  Location: [facility];  Service: General   • SPLENECTOMY     • VENOUS ACCESS DEVICE (PORT) INSERTION     • VENOUS ACCESS DEVICE (PORT)  "REMOVAL  2003       Allergies   Allergen Reactions   • Iodine Anaphylaxis   • Penicillins Anaphylaxis   • Latex Rash       Family History and Social History reviewed and changed as necessary      REVIEW OF SYSTEM:   Review of Systems   Constitutional: Negative for appetite change, chills, diaphoresis, fatigue, fever and unexpected weight change.   HENT:   Negative for mouth sores, sore throat and trouble swallowing.    Eyes: Negative for icterus.   Respiratory: Negative for cough, hemoptysis and shortness of breath.    Cardiovascular: Negative for chest pain, leg swelling and palpitations.   Gastrointestinal: Positive for abdominal distention and abdominal pain; negative for blood in stool, constipation, diarrhea, nausea and vomiting.   Endocrine: Negative for hot flashes.   Genitourinary: Negative for bladder incontinence, difficulty urinating, dysuria, frequency and hematuria.    Musculoskeletal: Negative for gait problem, neck pain and neck stiffness.   Skin: Negative for rash.   Neurological: Negative for dizziness, gait problem, headaches, light-headedness and numbness.   Hematological: Negative for adenopathy. Does not bruise/bleed easily.   Psychiatric/Behavioral: Negative for depression. The patient is not nervous/anxious.    All other systems reviewed and are negative.       PHYSICAL EXAM    Vitals:    08/17/18 1019   BP: (!) 179/102   Pulse: 97   Resp: 16   Temp: 97.4 °F (36.3 °C)   Weight: 76.7 kg (169 lb)   Height: 167.6 cm (66\")     Constitutional: Appears well-developed and well-nourished. No distress.   ECOG: (0) Fully active, able to carry on all predisease performance without restriction  HENT:   Head: Normocephalic.   Mouth/Throat: Oropharynx is clear and moist.   Eyes: Conjunctivae are normal. Pupils are equal, round, and reactive to light. No scleral icterus.   Neck: Neck supple. No JVD present. No thyromegaly present.   Cardiovascular: Normal rate, regular rhythm and normal heart sounds.  "   Pulmonary/Chest: Breath sounds normal. No respiratory distress.   Abdominal: Soft. Exhibits no distension and no mass. There is no hepatosplenomegaly. There is no tenderness. There is no rebound and no guarding.   Musculoskeletal:Exhibits no edema, tenderness or deformity.   Neurological: Alert and oriented to person, place, and time. Exhibits normal muscle tone.   Skin: No ecchymosis, no petechiae and no rash noted. Not diaphoretic. No cyanosis. Nails show no clubbing.   Psychiatric: Normal mood and affect.   Vitals reviewed.  Labs reviewed.    Nm Pet Skull Base To Mid Thigh    Result Date: 8/15/2018  1. Shotty nodes in the anterior mediastinal fat, weakly hypermetabolic, and of only questionable significance. Imaging follow-up is suggested to evaluate for stability. 2. Expected activity at the patient's left-sided port injection site. Otherwise negative PET scan.    D:  08/15/2018 E:  08/15/2018  This report was finalized on 8/15/2018 2:36 PM by DR. Pepito Gregory MD.              ASSESSMENT & PLAN:    1. Stage IIa ER positive TN negative HER-2/taiwo negative intermediate risk Oncotype breast cancer  2. Elevation of liver enzymes with CT negative for metastasis.   3.   Chek2 mutation conferring higher risk of colon as well as breast cancer.  4.   Abdominal pain  5.   Genital Herpes Simplex  6.   Lymphedema  7.  Lower extremity swelling    Discussion: Her CMP was drawn with her last treatment 3 weeks ago showed increased LFTs.  She is continuing to have upper abdominal/midepigastric pain that is constant.  I will hold treatment this week, we'll repeat her CBC and CMP.  We will get her to gastroenterology for further evaluation and EGD.  This pain has been constant since her mastectomy per her report.  She does have Chek 2 mutation on genetic testing which puts her at higher risk for colon cancer, she will need colonoscopy once she completes chemotherapy.   Pet/CT was negative other than for shotty nodes in the anterior  mediastinal fat that were weakly hypermetabolic, will follow-up with noncontrast CT down the road.  She will continue Famvir 250 mg twice daily that she is taking during chemotherapy to hopefully prevent recurrence. Once she completes radiation she will need radiation and has seen Dr. Fowler and we will get her back to him for radiation when she completes chemotherapy.  She has lymphedema and is working with physical therapy.  We will see her back next week for cycle #3 and will resume if stable and LFT's back down, if not will work up for viral causes.  She states she is unaware of any history of exposure to viral illness or hepatitis.   She had bilateral venous Doppler study earlier this week due to lower extremity swelling, these were negative DVT.  Swelling has almost resolved.      Nuvia Andrews, APRN    08/17/2018

## 2018-08-20 ENCOUNTER — HOSPITAL ENCOUNTER (OUTPATIENT)
Dept: PHYSICAL THERAPY | Facility: HOSPITAL | Age: 48
Setting detail: THERAPIES SERIES
Discharge: HOME | End: 2018-08-20
Attending: SURGERY

## 2018-08-20 DIAGNOSIS — R68.89 IMPAIRED FUNCTION OF UPPER EXTREMITY: ICD-10-CM

## 2018-08-20 DIAGNOSIS — N64.4 MASTODYNIA OF LEFT BREAST: ICD-10-CM

## 2018-08-20 DIAGNOSIS — I97.2 POST-MASTECTOMY LYMPHEDEMA SYNDROME: Primary | ICD-10-CM

## 2018-08-20 DIAGNOSIS — L90.5 SCAR CONDITIONS AND FIBROSIS OF SKIN: ICD-10-CM

## 2018-08-20 DIAGNOSIS — I89.0 LYMPHEDEMA OF RIGHT UPPER EXTREMITY: ICD-10-CM

## 2018-08-20 PROCEDURE — 97140 MANUAL THERAPY 1/> REGIONS: CPT | Performed by: PHYSICAL THERAPIST

## 2018-08-20 NOTE — THERAPY TREATMENT NOTE
"    Outpatient Physical Therapy Lymphedema Treatment Note  [facility]     Patient Name: Tia Sommers  : 1970  MRN: [id number]  Today's Date: 2018        Visit Date: 2018    Visit Dx:    ICD-10-CM ICD-9-CM   1. 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 Lymphedema of right upper extremity I89.0 457.1   3. Scar conditions and fibrosis of skin L90.5 709.2   4. Mastodynia of left breast N64.4 611.71   5. Impaired function of upper extremity R68.89 V49.5       Patient Active Problem List   Diagnosis   • Breast cancer, left (CMS/HCC)   • Epigastric pain   • Elevated LFTs              Lymphedema     Row Name 18 1300          Able to rate subjective pain? yes  -MW    Pre-Treatment Pain Level 1  -MW    Post-Treatment Pain Level 1  -MW    Subjective Pain Comment  in the \"pockets\" but getting better   -MW          Subjective Comments Reports chemo was postponed to next week due to lab values/ liver numbers too high. Also reports has been scheduled for a GI test on Wednesday.   -MW          Ptting Edema Category By severity  -MW    Pitting Edema Mild  -MW    Edema Assessment Comment RUE mild; bilat lateral trunk / ribs mild   -MW          Location/Assessment Upper Extremity;Upper Quadrant  -MW    Upper Extremity Conditions bilateral:;intact;clean;left:;other (comment)   LT multiple tatoos   -MW    Upper Extremity Color/Pigment bilateral:   WNL for ethnicity   -MW    Upper Quadrant Conditions bilateral:;intact  -MW    Upper Quadrant Color/Pigment bilateral:;other (comment)   WNL   -MW          Manual Lymphatic Drainage initial sequence;opened regional lymph nodes;opened anastamoses;extremity treatment;astym  -MW    Initial Sequence supraclavicular;shoulder collectors  -MW    Supraclavicular right;left  -MW    Shoulder Collectors right;left  -MW    Abdomen --   deferred due to fullness/ bloating   -MW    Opened Regional Lymph Nodes inguinal  -MW    Inguinal right;left  -MW    " "Opened Anastamoses axillo-inguinal  -    Axillo-Inguinal right;left  -    Extremity Treatment simple/brief MLD;MLD to proximal limb only  -    Simple/Brief MLD RUE, Rt and LT lateral trunk   -    MLD to Proximal Limb Only LUE  -    Astym mastectomy protocol  -    Mastectomy Protocol supine;sitting  -    Mastectomy Protocol Comment Seated on stool with upper body resting on face craddle to focus on Rt & Lt upper back/ scapular area with evaluator and localizer; extra strokes to leveator scap, mid traps and teres mm groups. Continued strokes into lateral trunk adjacent to breast area. In supine: Evaluator and localizer to Rt and Lt chest, working around port on Lt. Mild to moderate soft tissue disruptions Rt chest, very mild Lt chest. Continued ASTYM into lateral trunk / ribs; mild soft tissue disruptions bilaterally. Repositioned in shoulder flex/ ABD to open axilla (partially able to achieve position; Lt more restricted than Rt) and work on pectoralis tightness, soft tissue restrictions with extra strokes with isolator to bilat chest. Isolator star burst around incision lines bilaterally; soft tissue thick adjacent to incision lines.   -    Manual Lymphatic Drainage Comments MLD mixed with STM post ASTYM working around trunk with position changes.   -          Compression/Skin Care skin care;wrapping location;bandaging;compression garment  -    Skin Care moisturizing lotion applied   residual cocoa butter on trunk   -MW    Wrapping Location upper extremity  -    Wrapping Location UE right:;hand to axilla  -    Compression Garment Comments Assisted pt to don RUE arm sleeve  -    Compression/Skin Care Comments Pt using \"old tube top\" for chest compression.   -      User Key  (r) = Recorded By, (t) = Taken By, (c) = Cosigned By    Initials Name Provider Type    MW Jennifer Noriega, PT Physical Therapist                              PT Assessment/Plan     Row Name 08/20/18 1300          PT " "Assessment    Functional Limitations Performance in self-care ADL;Limitation in home management;Performance in work activities  -     Impairments Edema;Impaired lymphatic circulation;Impaired postural alignment;Muscle strength;Pain;Range of motion;Sensation  -     Assessment Comments Lateral trunk edema decreased with pt's use of compression \"wrap\" (old tube top). RUE lymphedema stable with arm sleeve and pump use. Soft tissue restrictions continue to loosen and pain at pectoralis tightness at incision line also continues to improve. Cont to focus on LT shoulder ROM/ flexibility for eventual XRT positioning needs.   -MW     Rehab Potential Good  -MW     Patient/caregiver participated in establishment of treatment plan and goals Yes  -MW     Patient would benefit from skilled therapy intervention Yes  -MW        PT Plan    PT Frequency 2x/week;1x/week  -MW     Physical Therapy Interventions (Optional Details) manual therapy techniques;manual lymphatic drainage;postural re-education;ROM (Range of Motion);stretching;strengthening;bandaging;home exercise program  -     PT Plan Comments Cont ASTYM/STM, MLD; progress Ther exer marina LT shoulder ROM/ flexibility   -       User Key  (r) = Recorded By, (t) = Taken By, (c) = Cosigned By    Initials Name Provider Type    Jennifer Solorio, PT Physical Therapist                     Exercises     Row Name 08/20/18 1300             Subjective Comments    Subjective Comments Reports chemo was postponed to next week due to lab values/ liver numbers too high. Also reports has been scheduled for a GI test on Wednesday.   -MW         Subjective Pain    Able to rate subjective pain? yes  -MW      Pre-Treatment Pain Level 1  -MW      Post-Treatment Pain Level 1  -MW      Subjective Pain Comment  in the \"pockets\" but getting better   -MW         Total Minutes    91358 - PT Manual Therapy Minutes 60  -MW         Exercise 1    Exercise Name 1 RT HBH wings   -MW      " Reps 1 6  -MW         Exercise 2    Exercise Name 2 LT HBH wings   -MW      Reps 2 6  -MW        User Key  (r) = Recorded By, (t) = Taken By, (c) = Cosigned By    Initials Name Provider Type    Jennifer Solorio, PT Physical Therapist                       Manual Rx (last 36 hours)      Manual Treatments     Row Name 08/20/18 1300             Total Minutes    68978 - PT Manual Therapy Minutes 60  -MW         Manual Rx 1    Manual Rx 1 Location Bilat chest / mastectomy sites   -MW      Manual Rx 1 Type STM with tissue bending at lateral pectoralis border and at incision lines   -MW      Manual Rx 1 Grade gentle to moderate  -MW      Manual Rx 1 Duration 20  -MW         Manual Rx 2    Manual Rx 2 Location LT & RT upper trap, mid trap, leveator scap and teres mm groups   -MW      Manual Rx 2 Type STM with tissue bending and lifting techniques   -MW      Manual Rx 2 Grade gentle to moderate   -MW      Manual Rx 2 Duration 20  -MW        User Key  (r) = Recorded By, (t) = Taken By, (c) = Cosigned By    Initials Name Provider Type    Jennifer Solorio, PT Physical Therapist                             Time Calculation:   Start Time: 1300  Total Timed Code Minutes- PT: 60 minute(s)   Therapy Suggested Charges     Code   Minutes Charges    84399 (CPT®) Hc Pt Neuromusc Re Education Ea 15 Min      11570 (CPT®) Hc Pt Ther Proc Ea 15 Min      59545 (CPT®) Hc Gait Training Ea 15 Min      46194 (CPT®) Hc Pt Therapeutic Act Ea 15 Min      92065 (CPT®) Hc Pt Manual Therapy Ea 15 Min 60 4    91160 (CPT®) Hc Pt Ther Massage- Per 15 Min      47066 (CPT®) Hc Pt Iontophoresis Ea 15 Min      22321 (CPT®) Hc Pt Elec Stim Ea-Per 15 Min      91812 (CPT®) Hc Pt Ultrasound Ea 15 Min      72372 (CPT®) Hc Pt Self Care/Mgmt/Train Ea 15 Min      48933 (CPT®) Hc Pt Prosthetic (S) Train Initial Encounter, Each 15 Min      36112 (CPT®) Hc Orthotic(S) Mgmt/Train Initial Encounter, Each 15min      95527 (CPT®) Hc Pt Aquatic Therapy Ea 15 Min       86166 (CPT®) Hc Pt Orthotic(S)/Prosthetic(S) Encounter, Each 15 Min      Total  60 4        Therapy Charges for Today     Code Description Service Date Service Provider Modifiers Qty    82672012021 HC PT MANUAL THERAPY EA 15 MIN 8/20/2018 Jennifer Noriega, PT GP 4                    Jennifer Noriega, PT  8/20/2018

## 2018-08-22 ENCOUNTER — OFFICE VISIT (OUTPATIENT)
Dept: GASTROENTEROLOGY | Facility: CLINIC | Age: 48
End: 2018-08-22

## 2018-08-22 ENCOUNTER — LAB (OUTPATIENT)
Dept: LAB | Facility: HOSPITAL | Age: 48
End: 2018-08-22

## 2018-08-22 VITALS
TEMPERATURE: 97.6 F | DIASTOLIC BLOOD PRESSURE: 92 MMHG | RESPIRATION RATE: 16 BRPM | SYSTOLIC BLOOD PRESSURE: 144 MMHG | HEART RATE: 82 BPM | OXYGEN SATURATION: 97 % | HEIGHT: 66 IN | BODY MASS INDEX: 26.74 KG/M2 | WEIGHT: 166.4 LBS

## 2018-08-22 DIAGNOSIS — K21.9 CHRONIC GERD: ICD-10-CM

## 2018-08-22 DIAGNOSIS — K21.9 CHRONIC GERD: Primary | ICD-10-CM

## 2018-08-22 DIAGNOSIS — Z79.1 ENCOUNTER FOR LONG-TERM (CURRENT) USE OF NSAIDS: ICD-10-CM

## 2018-08-22 DIAGNOSIS — Z12.11 SCREENING FOR COLON CANCER: ICD-10-CM

## 2018-08-22 PROCEDURE — 99214 OFFICE O/P EST MOD 30 MIN: CPT | Performed by: NURSE PRACTITIONER

## 2018-08-22 PROCEDURE — 83013 H PYLORI (C-13) BREATH: CPT

## 2018-08-23 ENCOUNTER — HOSPITAL ENCOUNTER (OUTPATIENT)
Dept: PHYSICAL THERAPY | Facility: HOSPITAL | Age: 48
Setting detail: THERAPIES SERIES
Discharge: HOME | End: 2018-08-23
Attending: SURGERY

## 2018-08-23 DIAGNOSIS — N64.4 MASTODYNIA OF LEFT BREAST: ICD-10-CM

## 2018-08-23 DIAGNOSIS — I89.0 LYMPHEDEMA OF RIGHT UPPER EXTREMITY: ICD-10-CM

## 2018-08-23 DIAGNOSIS — I97.2 POST-MASTECTOMY LYMPHEDEMA SYNDROME: Primary | ICD-10-CM

## 2018-08-23 DIAGNOSIS — R68.89 IMPAIRED FUNCTION OF UPPER EXTREMITY: ICD-10-CM

## 2018-08-23 DIAGNOSIS — L90.5 SCAR CONDITIONS AND FIBROSIS OF SKIN: ICD-10-CM

## 2018-08-23 LAB — UREA BREATH TEST QL: NEGATIVE

## 2018-08-23 PROCEDURE — 97140 MANUAL THERAPY 1/> REGIONS: CPT | Performed by: PHYSICAL THERAPIST

## 2018-08-23 NOTE — THERAPY TREATMENT NOTE
"    Outpatient Physical Therapy Lymphedema Treatment Note  [facility]     Patient Name: Tia Sommers  : 1970  MRN: [id number]  Today's Date: 2018        Visit Date: 2018    Visit Dx:    ICD-10-CM ICD-9-CM   1. 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 Lymphedema of right upper extremity I89.0 457.1   3. Scar conditions and fibrosis of skin L90.5 709.2   4. Mastodynia of left breast N64.4 611.71   5. Impaired function of upper extremity R68.89 V49.5       Patient Active Problem List   Diagnosis   • Breast cancer, left (CMS/HCC)   • Epigastric pain   • Elevated LFTs              Lymphedema     Row Name 18 1310          Able to rate subjective pain? yes  -MW    Pre-Treatment Pain Level 1  -MW    Post-Treatment Pain Level 1  -MW    Subjective Pain Comment Tender in \"pockets\" Lt > Rt   -MW          Subjective Comments Reports Wednesday's appointment was prep for upper and lower GI tests now scheduled for Sept 10. Goes for chemo tomorrow.   -MW          Ptting Edema Category By severity  -MW    Pitting Edema Mild  -MW    Edema Assessment Comment RUE mild; bilat lateral trunk / ribs mild to moderate   -MW          Location/Assessment Upper Extremity;Upper Quadrant  -MW    Upper Extremity Conditions bilateral:;intact;clean;left:;other (comment)   LT multiple tatoos   -MW    Upper Extremity Color/Pigment bilateral:   WNL for ethnicity   -MW    Upper Quadrant Conditions bilateral:;intact  -MW    Upper Quadrant Color/Pigment bilateral:;other (comment)   WNL   -MW          Manual Lymphatic Drainage initial sequence;opened regional lymph nodes;opened anastamoses;extremity treatment;astym  -MW    Initial Sequence supraclavicular;shoulder collectors  -MW    Supraclavicular right;left  -MW    Shoulder Collectors right;left  -MW    Abdomen --   deferred due to fullness/ bloating   -MW    Opened Regional Lymph Nodes inguinal  -MW    Inguinal right;left  -MW    Opened Anastamoses " axillo-inguinal  -MW    Axillo-Inguinal right;left  -MW    Extremity Treatment simple/brief MLD;MLD to proximal limb only  -MW    Simple/Brief MLD Rt and LT lateral trunk   -    Astym mastectomy protocol  -    Mastectomy Protocol supine;sitting  -    Mastectomy Protocol Comment Seated on stool with upper body resting on face craddle to focus on Rt & Lt upper back/ scapular area with evaluator and localizer; extra strokes to leveator scap, mid traps and teres mm groups. Continued strokes into lateral trunk adjacent to breast area. In supine: Evaluator and localizer to Rt and Lt chest, working around port on Lt. Mild to moderate soft tissue disruptions Rt chest, very mild Lt chest. Continued ASTYM into lateral trunk / ribs; mild soft tissue disruptions bilaterally. Repositioned in shoulder flex/ ABD to open axilla (partially able to achieve position Rt; Lt opening well) and work on pectoralis tightness, soft tissue restrictions with extra strokes with isolator to bilat chest. Isolator star burst around incision lines bilaterally; soft tissue thick adjacent to incision lines.   -MW    Manual Lymphatic Drainage Comments MLD mixed with STM post ASTYM working around trunk with position changes.   -          Compression/Skin Care skin care;wrapping location;bandaging;compression garment  -    Skin Care moisturizing lotion applied   residual cocoa butter on trunk   -MW    Wrapping Location upper extremity  -    Wrapping Location UE right:;hand to axilla  -    Compression Garment Comments Assisted pt to don RUE arm sleeve  -    Compression/Skin Care Comments Encouraged pt to continue to use chest compression as more fullness noted bilat without support   -      User Key  (r) = Recorded By, (t) = Taken By, (c) = Cosigned By    Initials Name Provider Type    Jennifer Solorio, PT Physical Therapist                              PT Assessment/Plan     Row Name 08/23/18 1310          PT Assessment     "Functional Limitations Performance in self-care ADL;Limitation in home management;Performance in work activities  -     Impairments Edema;Impaired lymphatic circulation;Impaired postural alignment;Muscle strength;Pain;Range of motion;Sensation  -     Assessment Comments Lt shoulder demonstrating improved ROM / flexibility with HBH and elbow down to pillow (improved approx 20 deg from last visit). Also less tenderness with STM at lateral pectoralis border. Mild increase in lateral trunk fullness without her trunk compression; expect this to return to baseline with consistent use of compression. Good Lt shoulder flexibility improvement to prepare of XRT starting soon.   -MW     Rehab Potential Good  -MW     Patient/caregiver participated in establishment of treatment plan and goals Yes  -MW     Patient would benefit from skilled therapy intervention Yes  -MW        PT Plan    PT Frequency 2x/week;1x/week  -     Physical Therapy Interventions (Optional Details) manual therapy techniques;manual lymphatic drainage;postural re-education;ROM (Range of Motion);stretching;strengthening;bandaging;home exercise program  -     PT Plan Comments Cont ASTYM/STM, MLD; progress Ther exer marina LT shoulder ROM/ flexibility; consider decrease to 1 x wk if progress continues   -       User Key  (r) = Recorded By, (t) = Taken By, (c) = Cosigned By    Initials Name Provider Type    Jennifer Solorio, PT Physical Therapist                     Exercises     Row Name 08/23/18 1310             Subjective Comments    Subjective Comments Reports Wednesday's appointment was prep for upper and lower GI tests now scheduled for Sept 10. Goes for chemo tomorrow.   -MW         Subjective Pain    Able to rate subjective pain? yes  -MW      Pre-Treatment Pain Level 1  -MW      Post-Treatment Pain Level 1  -MW      Subjective Pain Comment Tender in \"pockets\" Lt > Rt   -MW         Total Minutes    29737 - PT Therapeutic Exercise Minutes 5  " -MW      76328 - PT Manual Therapy Minutes 55  -MW         Exercise 1    Exercise Name 1 RT HBH wings   -MW      Reps 1 6  -MW         Exercise 2    Exercise Name 2 LT HBH wings   -MW      Reps 2 6  -MW      Time 2 45-60 second hold   -MW         Exercise 3    Exercise Name 3 RSL LUE reach (scap protraction, then retraction)   -MW      Cueing 3 Demo;Verbal;Tactile  -MW      Reps 3 5  -MW         Exercise 4    Exercise Name 4 RUE reach and retract in sitting   -MW      Cueing 4 Verbal  -MW      Reps 4 2  -MW      Additional Comments Unable to lay on LSL due to port pain   -MW        User Key  (r) = Recorded By, (t) = Taken By, (c) = Cosigned By    Initials Name Provider Type    Jennifer Solorio, PT Physical Therapist                       Manual Rx (last 36 hours)      Manual Treatments     Row Name 08/23/18 1310             Total Minutes    08583 - PT Manual Therapy Minutes 55  -MW         Manual Rx 1    Manual Rx 1 Location Bilat chest / mastectomy sites   -MW      Manual Rx 1 Type STM with tissue bending at lateral pectoralis border and at incision lines   -MW      Manual Rx 1 Grade gentle to moderate  -MW      Manual Rx 1 Duration 20  -MW         Manual Rx 2    Manual Rx 2 Location LT & RT upper trap, mid trap, leveator scap and teres mm groups   -MW      Manual Rx 2 Type STM with tissue bending and lifting techniques   -MW      Manual Rx 2 Grade gentle to moderate   -MW      Manual Rx 2 Duration 20  -MW        User Key  (r) = Recorded By, (t) = Taken By, (c) = Cosigned By    Initials Name Provider Type    Jennifer Solorio, PT Physical Therapist              Therapy Education  Education Details: Progressed HEP   Given: HEP, Symptoms/condition management, Edema management  Program: Progressed  How Provided: Verbal, Demonstration  Provided to: Patient  Level of Understanding: Verbalized, Teach back education performed, Demonstrated              Time Calculation:   Start Time: 1310  Total Timed Code Minutes-  PT: 60 minute(s)   Therapy Suggested Charges     Code   Minutes Charges    21017 (CPT®) Hc Pt Neuromusc Re Education Ea 15 Min      06809 (CPT®) Hc Pt Ther Proc Ea 15 Min 5     80779 (CPT®) Hc Gait Training Ea 15 Min      04926 (CPT®) Hc Pt Therapeutic Act Ea 15 Min      09725 (CPT®) Hc Pt Manual Therapy Ea 15 Min 55 4    00398 (CPT®) Hc Pt Ther Massage- Per 15 Min      93430 (CPT®) Hc Pt Iontophoresis Ea 15 Min      53075 (CPT®) Hc Pt Elec Stim Ea-Per 15 Min      84099 (CPT®) Hc Pt Ultrasound Ea 15 Min      64923 (CPT®) Hc Pt Self Care/Mgmt/Train Ea 15 Min      07901 (CPT®) Hc Pt Prosthetic (S) Train Initial Encounter, Each 15 Min      51700 (CPT®) Hc Orthotic(S) Mgmt/Train Initial Encounter, Each 15min      59860 (CPT®) Hc Pt Aquatic Therapy Ea 15 Min      53640 (CPT®) Hc Pt Orthotic(S)/Prosthetic(S) Encounter, Each 15 Min      Total  60 4        Therapy Charges for Today     Code Description Service Date Service Provider Modifiers Qty    57170876621 HC PT MANUAL THERAPY EA 15 MIN 8/23/2018 Jennifer Noriega, PT GP 4                    Jennifer Noriega, PT  8/23/2018

## 2018-08-24 ENCOUNTER — OFFICE VISIT (OUTPATIENT)
Dept: ONCOLOGY | Facility: CLINIC | Age: 48
End: 2018-08-24

## 2018-08-24 ENCOUNTER — LAB (OUTPATIENT)
Dept: LAB | Facility: HOSPITAL | Age: 48
End: 2018-08-24

## 2018-08-24 ENCOUNTER — INFUSION (OUTPATIENT)
Dept: ONCOLOGY | Facility: HOSPITAL | Age: 48
End: 2018-08-24

## 2018-08-24 VITALS
DIASTOLIC BLOOD PRESSURE: 129 MMHG | TEMPERATURE: 97.7 F | WEIGHT: 164 LBS | RESPIRATION RATE: 16 BRPM | HEART RATE: 92 BPM | BODY MASS INDEX: 26.36 KG/M2 | HEIGHT: 66 IN | SYSTOLIC BLOOD PRESSURE: 190 MMHG

## 2018-08-24 DIAGNOSIS — C50.912 MALIGNANT NEOPLASM OF LEFT BREAST IN FEMALE, ESTROGEN RECEPTOR POSITIVE, UNSPECIFIED SITE OF BREAST (HCC): Primary | ICD-10-CM

## 2018-08-24 DIAGNOSIS — Z17.0 MALIGNANT NEOPLASM OF LEFT BREAST IN FEMALE, ESTROGEN RECEPTOR POSITIVE, UNSPECIFIED SITE OF BREAST (HCC): Primary | ICD-10-CM

## 2018-08-24 LAB
ALBUMIN SERPL-MCNC: 4.21 G/DL (ref 3.2–4.8)
ALBUMIN/GLOB SERPL: 1.3 G/DL (ref 1.5–2.5)
ALP SERPL-CCNC: 104 U/L (ref 25–100)
ALT SERPL W P-5'-P-CCNC: 24 U/L (ref 7–40)
ANION GAP SERPL CALCULATED.3IONS-SCNC: 6 MMOL/L (ref 3–11)
AST SERPL-CCNC: 29 U/L (ref 0–33)
BILIRUB SERPL-MCNC: 0.8 MG/DL (ref 0.3–1.2)
BUN BLD-MCNC: 10 MG/DL (ref 9–23)
BUN/CREAT SERPL: 14.5 (ref 7–25)
CALCIUM SPEC-SCNC: 9.1 MG/DL (ref 8.7–10.4)
CHLORIDE SERPL-SCNC: 107 MMOL/L (ref 99–109)
CO2 SERPL-SCNC: 26 MMOL/L (ref 20–31)
CREAT BLD-MCNC: 0.69 MG/DL (ref 0.6–1.3)
ERYTHROCYTE [DISTWIDTH] IN BLOOD BY AUTOMATED COUNT: 15.9 % (ref 11.3–14.5)
GFR SERPL CREATININE-BSD FRML MDRD: 109 ML/MIN/1.73
GFR SERPL CREATININE-BSD FRML MDRD: 90 ML/MIN/1.73
GLOBULIN UR ELPH-MCNC: 3.2 GM/DL
GLUCOSE BLD-MCNC: 110 MG/DL (ref 70–100)
HCT VFR BLD AUTO: 36.5 % (ref 34.5–44)
HGB BLD-MCNC: 11.6 G/DL (ref 11.5–15.5)
LYMPHOCYTES # BLD AUTO: 1.4 10*3/MM3 (ref 0.6–4.8)
LYMPHOCYTES NFR BLD AUTO: 32 % (ref 24–44)
MCH RBC QN AUTO: 27.3 PG (ref 27–31)
MCHC RBC AUTO-ENTMCNC: 31.8 G/DL (ref 32–36)
MCV RBC AUTO: 85.8 FL (ref 80–99)
MONOCYTES # BLD AUTO: 0.2 10*3/MM3 (ref 0–1)
MONOCYTES NFR BLD AUTO: 4.4 % (ref 0–12)
NEUTROPHILS # BLD AUTO: 2.9 10*3/MM3 (ref 1.5–8.3)
NEUTROPHILS NFR BLD AUTO: 63.6 % (ref 41–71)
PLATELET # BLD AUTO: 324 10*3/MM3 (ref 150–450)
PMV BLD AUTO: 8 FL (ref 6–12)
POTASSIUM BLD-SCNC: 4.1 MMOL/L (ref 3.5–5.5)
PROT SERPL-MCNC: 7.4 G/DL (ref 5.7–8.2)
RBC # BLD AUTO: 4.26 10*6/MM3 (ref 3.89–5.14)
SODIUM BLD-SCNC: 139 MMOL/L (ref 132–146)
WBC NRBC COR # BLD: 4.5 10*3/MM3 (ref 3.5–10.8)

## 2018-08-24 PROCEDURE — 96417 CHEMO IV INFUS EACH ADDL SEQ: CPT

## 2018-08-24 PROCEDURE — 36591 DRAW BLOOD OFF VENOUS DEVICE: CPT

## 2018-08-24 PROCEDURE — 96375 TX/PRO/DX INJ NEW DRUG ADDON: CPT

## 2018-08-24 PROCEDURE — 80053 COMPREHEN METABOLIC PANEL: CPT

## 2018-08-24 PROCEDURE — 25010000002 DOCETAXEL 20 MG/ML SOLUTION 4 ML VIAL: Performed by: INTERNAL MEDICINE

## 2018-08-24 PROCEDURE — 25010000002 CYCLOPHOSPHAMIDE PER 100 MG: Performed by: INTERNAL MEDICINE

## 2018-08-24 PROCEDURE — 25010000002 PALONOSETRON PER 25 MCG: Performed by: INTERNAL MEDICINE

## 2018-08-24 PROCEDURE — 25010000002 DOCETAXEL 20 MG/ML SOLUTION 1 ML VIAL: Performed by: INTERNAL MEDICINE

## 2018-08-24 PROCEDURE — 96413 CHEMO IV INFUSION 1 HR: CPT

## 2018-08-24 PROCEDURE — 25010000002 HEPARIN FLUSH (PORCINE) 100 UNIT/ML SOLUTION: Performed by: INTERNAL MEDICINE

## 2018-08-24 PROCEDURE — 99215 OFFICE O/P EST HI 40 MIN: CPT | Performed by: INTERNAL MEDICINE

## 2018-08-24 PROCEDURE — 85025 COMPLETE CBC W/AUTO DIFF WBC: CPT

## 2018-08-24 RX ORDER — SODIUM CHLORIDE 0.9 % (FLUSH) 0.9 %
10 SYRINGE (ML) INJECTION AS NEEDED
Status: CANCELLED | OUTPATIENT
Start: 2018-08-24

## 2018-08-24 RX ORDER — SODIUM CHLORIDE 9 MG/ML
250 INJECTION, SOLUTION INTRAVENOUS ONCE
Status: CANCELLED | OUTPATIENT
Start: 2018-08-24

## 2018-08-24 RX ORDER — SODIUM CHLORIDE 0.9 % (FLUSH) 0.9 %
10 SYRINGE (ML) INJECTION AS NEEDED
Status: DISCONTINUED | OUTPATIENT
Start: 2018-08-24 | End: 2018-08-24 | Stop reason: HOSPADM

## 2018-08-24 RX ORDER — PALONOSETRON 0.05 MG/ML
0.25 INJECTION, SOLUTION INTRAVENOUS ONCE
Status: COMPLETED | OUTPATIENT
Start: 2018-08-24 | End: 2018-08-24

## 2018-08-24 RX ORDER — SODIUM CHLORIDE 9 MG/ML
250 INJECTION, SOLUTION INTRAVENOUS ONCE
Status: COMPLETED | OUTPATIENT
Start: 2018-08-24 | End: 2018-08-24

## 2018-08-24 RX ORDER — PALONOSETRON 0.05 MG/ML
0.25 INJECTION, SOLUTION INTRAVENOUS ONCE
Status: CANCELLED | OUTPATIENT
Start: 2018-08-24

## 2018-08-24 RX ADMIN — Medication 10 ML: at 14:32

## 2018-08-24 RX ADMIN — CYCLOPHOSPHAMIDE 1000 MG: 1 INJECTION, POWDER, FOR SOLUTION INTRAVENOUS; ORAL at 13:49

## 2018-08-24 RX ADMIN — DOCETAXEL 135 MG: 80 INJECTION, SOLUTION, CONCENTRATE INTRAVENOUS at 12:34

## 2018-08-24 RX ADMIN — HEPARIN 500 UNITS: 100 SYRINGE at 14:32

## 2018-08-24 RX ADMIN — PALONOSETRON HYDROCHLORIDE 0.25 MG: 0.25 INJECTION INTRAVENOUS at 12:06

## 2018-08-24 RX ADMIN — SODIUM CHLORIDE 250 ML: 9 INJECTION, SOLUTION INTRAVENOUS at 12:06

## 2018-08-24 NOTE — PROGRESS NOTES
CHIEF COMPLAINT: Elevated enzymes resolved as of last week    Problem List:     Breast cancer, left (CMS/HCC)    5/16/2018 Initial Diagnosis     1.) Stage IIa [id number] Breast cancer, left: Had bilateral mastectomy on 5/16/18 with benign disease on the right.  The left breast had infiltrating lobular carcinoma, Bloom Gutierrez 6 out of 9, 2 foci largest being 4 cm, 4 total nodes and 2 sentinel nodes taken all negative.  There was a positive superficial margin.  Biopsy on 3/28/18 of abnormality found on screening mammogram was 95% 3+ positive ER and 95% 3+ positive IL and HER-2/taiwo 0+.  Baseline liver enzymes abnormal ALT 79 AST 55.    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  CHEK2 also causes an increased risk for colon cancer (up to 9.5% lifetime risk).           6/18/2018 -  Other Event     Oncotype score 28 with 18% 10 year risk of distant recurrence on tamoxifen alone.  There is about a 6% lower risk of distant recurrence when chemotherapy is added to the hormonal blockade per this prediction.  The Oncotype showed her to be ER positive and IL negative and HER-2/taiwo negative.         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  Patient was started on Valtrex after presenting to the emergency room on 7/11/2018, subsequently placed her on Famvir prophylaxis           8/14/2018 Imaging     Bilateral Lower Extremity Venous Doppler study negative for DVT.         8/15/2018 Imaging     PET/CT IMPRESSION:  1. Shotty nodes in the anterior mediastinal fat, weakly hypermetabolic,  and of only questionable significance. Imaging follow-up is suggested to  evaluate  for stability.  2. Expected activity at the patient's left-sided port injection site.  Otherwise negative PET scan.            HISTORY OF PRESENT ILLNESS:  The patient is a 48 y.o. female, here for follow up on management of adjuvant therapy of breast cancer.  Developed HSV vaginal infection as well as liver enzyme elevation and joint aches.  The liver enzyme elevation as of last week has resolved and her vaginal outbreak is improved on suppressive doses of Famvir.  Her joint aches have also resolved.  The PET scan I reviewed the images and reports thereof from 8/15/18 and other than for faint weak anterior mediastinal fat, there is really nothing else and I think that's unlikely to be anything of import with shotty nodes in that area.      Past Medical History:   Diagnosis Date   • Arthritis     minna knees and hands   • Breast cancer (CMS/HCC) 2000    dx in 1998;  2018   • Drug therapy 2000   • Hx of radiation therapy 2000   • Lupus 2014   • Lymphedema     right arm     Past Surgical History:   Procedure Laterality Date   • BONE BIOPSY      back   • BREAST BIOPSY Right    • BREAST LUMPECTOMY Right 2000   • COLONOSCOPY      > 10 years   • DIAGNOSTIC LAPAROSCOPY EXPLORATORY LAPAROTOMY     • ENDOSCOPY     • MASTECTOMY W/ SENTINEL NODE BIOPSY Bilateral 5/16/2018    Procedure: BREAST MASTECTOMY BILATERAL WITH LEFT SENTINEL NODE BIOPSY;  Surgeon: Keven Dye MD;  Location: [facility];  Service: General   • SPLENECTOMY     • VENOUS ACCESS DEVICE (PORT) INSERTION     • VENOUS ACCESS DEVICE (PORT) REMOVAL  2003       Allergies   Allergen Reactions   • Iodine Anaphylaxis   • Penicillins Anaphylaxis   • Latex Rash       Family History and Social History reviewed and changed as necessary      REVIEW OF SYSTEM:   Review of Systems   Constitutional: Negative for appetite change, chills, diaphoresis, fatigue, fever and unexpected weight change.   HENT:   Negative for mouth sores, sore throat and trouble swallowing.    Eyes:  "Negative for icterus.   Respiratory: Negative for cough, hemoptysis and shortness of breath.    Cardiovascular: Negative for chest pain, leg swelling and palpitations.   Gastrointestinal: Negative for abdominal distention, abdominal pain, blood in stool, constipation, diarrhea, nausea and vomiting.   Endocrine: Negative for hot flashes.   Genitourinary: Negative for bladder incontinence, difficulty urinating, dysuria, frequency and hematuria.    Musculoskeletal: Negative for gait problem, neck pain and neck stiffness.   Skin: Negative for rash.   Neurological: Negative for dizziness, gait problem, headaches, light-headedness and numbness.   Hematological: Negative for adenopathy. Does not bruise/bleed easily.   Psychiatric/Behavioral: Negative for depression. The patient is not nervous/anxious.    All other systems reviewed and are negative.       PHYSICAL EXAM    Vitals:    08/24/18 1009   BP: (!) 190/129   Pulse: 92   Resp: 16   Temp: 97.7 °F (36.5 °C)   Weight: 74.4 kg (164 lb)   Height: 167.6 cm (66\")     Constitutional: Appears well-developed and well-nourished. No distress.   ECOG: (0) Fully active, able to carry on all predisease performance without restriction  HENT:   Head: Normocephalic.   Mouth/Throat: Oropharynx is clear and moist.   Eyes: Conjunctivae are normal. Pupils are equal, round, and reactive to light. No scleral icterus.   Neck: Neck supple. No JVD present. No thyromegaly present.   Cardiovascular: Normal rate, regular rhythm and normal heart sounds.    Pulmonary/Chest: Breath sounds normal. No respiratory distress.   Abdominal: Soft. Exhibits no distension and no mass. There is no hepatosplenomegaly. There is no tenderness. There is no rebound and no guarding.   Musculoskeletal:Exhibits no edema, tenderness or deformity.   Neurological: Alert and oriented to person, place, and time. Exhibits normal muscle tone.   Skin: No ecchymosis, no petechiae and no rash noted. Not diaphoretic. No " cyanosis. Nails show no clubbing.   Psychiatric: Normal mood and affect.   Vitals reviewed.      No radiology results for the last 7 days          ASSESSMENT & PLAN:    1.  Stage II hormone receptor positive breast cancer  2. Liver enzyme elevation  3. HSV vaginal infection    Discussion: her liver enzymes were down to normal last week.  I will resume the Taxotere Cytoxan though I actually had leaned her towards stopping after 2 courses of Taxotere Cytoxan giving the above complications.  She was not comfortable with that and wanted to press on.  If this were just a single episode of viral illness that she has no further issues despite course #3, then we will give course #4 when we see her back but if she has similar elevations of her liver enzymes and/or infectious disease complications then we will not give the fourth course and we will move on to her hormonal blockade and radiation.  I I will get her CBC and CMP in about 10 days as well to see where her level stand and what her liver enzymes look like.  She will can return to work probably 2-1/2 months from now.  Though I think the anterior mediastinal fat pad adenopathy is unlikely to be anything, I will check a CT when we are done with the chemotherapy in about 6 weeks to be sure that there is nothing progressing there.  Discussed with patient 45 minutes greater than 50% spent in counseling      Chad Valle MD    08/24/2018

## 2018-08-27 ENCOUNTER — HOSPITAL ENCOUNTER (OUTPATIENT)
Dept: PHYSICAL THERAPY | Facility: HOSPITAL | Age: 48
Setting detail: THERAPIES SERIES
Discharge: HOME | End: 2018-08-27
Attending: SURGERY

## 2018-08-27 DIAGNOSIS — I89.0 LYMPHEDEMA OF RIGHT UPPER EXTREMITY: ICD-10-CM

## 2018-08-27 DIAGNOSIS — N64.4 MASTODYNIA OF LEFT BREAST: ICD-10-CM

## 2018-08-27 DIAGNOSIS — L90.5 SCAR CONDITIONS AND FIBROSIS OF SKIN: ICD-10-CM

## 2018-08-27 DIAGNOSIS — R68.89 IMPAIRED FUNCTION OF UPPER EXTREMITY: ICD-10-CM

## 2018-08-27 DIAGNOSIS — I97.2 POST-MASTECTOMY LYMPHEDEMA SYNDROME: Primary | ICD-10-CM

## 2018-08-27 PROCEDURE — 97140 MANUAL THERAPY 1/> REGIONS: CPT | Performed by: PHYSICAL THERAPIST

## 2018-08-27 NOTE — THERAPY TREATMENT NOTE
"    Outpatient Physical Therapy Lymphedema Treatment Note  [facility]     Patient Name: Tia Sommers  : 1970  MRN: [id number]  Today's Date: 2018        Visit Date: 2018    Visit Dx:    ICD-10-CM ICD-9-CM   1. 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 Lymphedema of right upper extremity I89.0 457.1   3. Scar conditions and fibrosis of skin L90.5 709.2   4. Mastodynia of left breast N64.4 611.71   5. Impaired function of upper extremity R68.89 V49.5       Patient Active Problem List   Diagnosis   • Breast cancer, left (CMS/HCC)   • Epigastric pain   • Elevated LFTs              Lymphedema     Row Name 18 1200          Able to rate subjective pain? yes  -MW    Pre-Treatment Pain Level 1  -MW    Post-Treatment Pain Level 1  -MW    Subjective Pain Comment Tender in \"pockets\" Lt > Rt   -MW          Subjective Comments Had chemo on Friday, Saturday was \"ok\" but  was \"bad\" (didn't eat, mostly stayed in bed). \"better today\" but still rough.   -MW          Ptting Edema Category By severity  -MW    Pitting Edema Mild;Moderate  -MW    Edema Assessment Comment RUE mild; bilat lateral trunk / ribs mild to moderate   -MW          Location/Assessment Upper Extremity;Upper Quadrant  -MW    Upper Extremity Conditions bilateral:;intact;clean;left:;other (comment)   LT multiple tatoos   -MW    Upper Extremity Color/Pigment bilateral:   WNL for ethnicity   -MW    Upper Quadrant Conditions bilateral:;intact  -MW    Upper Quadrant Color/Pigment bilateral:;other (comment)   WNL   -MW          Manual Lymphatic Drainage initial sequence;opened regional lymph nodes;opened anastamoses;extremity treatment;astym  -MW    Initial Sequence supraclavicular;shoulder collectors  -MW    Supraclavicular right;left  -MW    Shoulder Collectors right;left  -MW    Abdomen --   deferred due to fullness/ bloating   -MW    Opened Regional Lymph Nodes inguinal  -MW    Inguinal right;left  -MW    Opened " Anastamoses axillo-inguinal  -MW    Axillo-Inguinal right;left  -MW    Extremity Treatment simple/brief MLD;MLD to proximal limb only  -MW    Simple/Brief MLD LT and Rt lateral trunk fullness   -MW    MLD to Proximal Limb Only LUE   -MW    MLD to Full Limb RUE   -MW    Astym mastectomy protocol  -    Mastectomy Protocol supine;sitting  -    Mastectomy Protocol Comment Seated on stool with upper body resting on face craddle to focus on Rt & Lt upper back/ scapular area with evaluator and localizer; extra strokes to leveator scap, mid traps and teres mm groups. Continued strokes into lateral trunk adjacent to breast area. In supine: Evaluator and localizer to Rt and Lt chest, working around port on Lt. Mild to moderate soft tissue disruptions Rt chest, very mild Lt chest. Continued ASTYM into lateral trunk / ribs; mild soft tissue disruptions bilaterally. Repositioned in shoulder flex/ ABD to open axilla (partially able to achieve position Rt; Lt opening well) and work on pectoralis tightness, soft tissue restrictions with extra strokes with isolator to bilat chest. Isolator star burst around incision lines bilaterally; soft tissue thick adjacent to incision lines.   -MW    Manual Lymphatic Drainage Comments MLD mixed with STM post ASTYM working around trunk with position changes.   -MW          Compression/Skin Care skin care;wrapping location;bandaging;compression garment  -    Skin Care moisturizing lotion applied   residual cocoa butter on trunk   -    Wrapping Location upper extremity  -    Wrapping Location UE right:;hand to axilla  -    Compression Garment Comments Assisted pt to don RUE arm sleeve  -    Compression/Skin Care Comments Reviewed self MLD to groin and down lateral trunk; also options for focused MLD with towel under arm or use of soft ball or foot ball to assist with axillary MLD and gentle compression at home by herself.   -      User Key  (r) = Recorded By, (t) = Taken By, (c) =  "Cosigned By    Initials Name Provider Type    Jennifer Solorio, PT Physical Therapist                              PT Assessment/Plan     Row Name 08/27/18 1200          PT Assessment    Functional Limitations Performance in self-care ADL;Limitation in home management;Performance in work activities  -     Impairments Edema;Impaired lymphatic circulation;Impaired postural alignment;Muscle strength;Pain;Range of motion;Sensation  -     Assessment Comments Pt returns post chemo #3; fatigued but continues to demonstrate soft tissue softening and improving flexibility, especially at LT shoulder. Mild to moderate fullness bilateral lateral trunk /ribs; decreased post MLD. Reassess progress towards goals next visit.   -     Rehab Potential Good  -MW     Patient/caregiver participated in establishment of treatment plan and goals Yes  -     Patient would benefit from skilled therapy intervention Yes  -MW        PT Plan    PT Frequency 2x/week;1x/week  -     Physical Therapy Interventions (Optional Details) manual therapy techniques;manual lymphatic drainage;postural re-education;ROM (Range of Motion);stretching;strengthening;bandaging;home exercise program  -     PT Plan Comments Cont ASTYM/STM, MLD; progress Ther exer marina LT shoulder ROM/ flexibility; consider decrease to 1 x wk if progress continues   -       User Key  (r) = Recorded By, (t) = Taken By, (c) = Cosigned By    Initials Name Provider Type    Jennifer Solorio, PT Physical Therapist                     Exercises     Row Name 08/27/18 1200             Subjective Comments    Subjective Comments Had chemo on Friday, Saturday was \"ok\" but Sunday was \"bad\" (didn't eat, mostly stayed in bed). \"better today\" but still rough.   -MW         Subjective Pain    Able to rate subjective pain? yes  -MW      Pre-Treatment Pain Level 1  -MW      Post-Treatment Pain Level 1  -MW      Subjective Pain Comment Tender in \"pockets\" Lt > Rt   -MW         Total " Minutes    61760 - PT Manual Therapy Minutes 60  -MW         Exercise 1    Exercise Name 1 RT HBH wings   -MW      Reps 1 8  -MW      Additional Comments concurrent with Manual therapy  -MW         Exercise 2    Exercise Name 2 LT HBH wings   -MW      Reps 2 8  -MW      Additional Comments concurrent with Manual therapy  -MW        User Key  (r) = Recorded By, (t) = Taken By, (c) = Cosigned By    Initials Name Provider Type    MW Jennifer Noriega, PT Physical Therapist                       Manual Rx (last 36 hours)      Manual Treatments     Row Name 08/27/18 1200             Total Minutes    31586 - PT Manual Therapy Minutes 60  -MW         Manual Rx 1    Manual Rx 1 Location Bilat chest / mastectomy sites   -MW      Manual Rx 1 Type STM with tissue bending, lifting and rolling at lateral pectoralis border and at incision lines   -MW      Manual Rx 1 Grade gentle to moderate  -MW      Manual Rx 1 Duration 20  -MW         Manual Rx 2    Manual Rx 2 Location LT & RT upper trap, mid trap, leveator scap and teres mm groups wrapping around into posterior axilla   -MW      Manual Rx 2 Type STM with tissue bending and lifting techniques   -MW      Manual Rx 2 Grade gentle to moderate   -MW      Manual Rx 2 Duration 20  -MW        User Key  (r) = Recorded By, (t) = Taken By, (c) = Cosigned By    Initials Name Provider Type    MW Jennifer Noriega, PT Physical Therapist              Therapy Education  Education Details: Self MLD options; cont self care for hydration and nourishment post chemo   Given: Edema management, Symptoms/condition management  Program: Reinforced  How Provided: Verbal, Demonstration  Provided to: Patient  Level of Understanding: Verbalized, Teach back education performed, Demonstrated              Time Calculation:   Start Time: 1200  Total Timed Code Minutes- PT: 60 minute(s)   Therapy Suggested Charges     Code   Minutes Charges    28291 (CPT®)  Pt Neuromusc Re Education Ea 15 Min      28010  (CPT®) Hc Pt Ther Proc Ea 15 Min      11380 (CPT®) Hc Gait Training Ea 15 Min      96884 (CPT®) Hc Pt Therapeutic Act Ea 15 Min      73930 (CPT®) Hc Pt Manual Therapy Ea 15 Min 60 4    53758 (CPT®) Hc Pt Ther Massage- Per 15 Min      21418 (CPT®) Hc Pt Iontophoresis Ea 15 Min      71527 (CPT®) Hc Pt Elec Stim Ea-Per 15 Min      66043 (CPT®) Hc Pt Ultrasound Ea 15 Min      36564 (CPT®) Hc Pt Self Care/Mgmt/Train Ea 15 Min      18779 (CPT®) Hc Pt Prosthetic (S) Train Initial Encounter, Each 15 Min      00186 (CPT®) Hc Orthotic(S) Mgmt/Train Initial Encounter, Each 15min      88879 (CPT®) Hc Pt Aquatic Therapy Ea 15 Min      12123 (CPT®) Hc Pt Orthotic(S)/Prosthetic(S) Encounter, Each 15 Min      Total  60 4        Therapy Charges for Today     Code Description Service Date Service Provider Modifiers Qty    04146081652 HC PT MANUAL THERAPY EA 15 MIN 8/27/2018 Jennifer Noriega, PT GP 4                    Jennifer Noriega, PT  8/27/2018

## 2018-08-30 ENCOUNTER — HOSPITAL ENCOUNTER (OUTPATIENT)
Dept: PHYSICAL THERAPY | Facility: HOSPITAL | Age: 48
Setting detail: THERAPIES SERIES
Discharge: HOME | End: 2018-08-30
Attending: SURGERY

## 2018-08-30 DIAGNOSIS — N64.4 MASTODYNIA OF LEFT BREAST: ICD-10-CM

## 2018-08-30 DIAGNOSIS — I97.2 POST-MASTECTOMY LYMPHEDEMA SYNDROME: Primary | ICD-10-CM

## 2018-08-30 DIAGNOSIS — R68.89 IMPAIRED FUNCTION OF UPPER EXTREMITY: ICD-10-CM

## 2018-08-30 DIAGNOSIS — L90.5 SCAR CONDITIONS AND FIBROSIS OF SKIN: ICD-10-CM

## 2018-08-30 DIAGNOSIS — I89.0 LYMPHEDEMA OF RIGHT UPPER EXTREMITY: ICD-10-CM

## 2018-08-30 PROCEDURE — 97140 MANUAL THERAPY 1/> REGIONS: CPT | Performed by: PHYSICAL THERAPIST

## 2018-09-04 ENCOUNTER — HOSPITAL ENCOUNTER (OUTPATIENT)
Dept: PHYSICAL THERAPY | Facility: HOSPITAL | Age: 48
Setting detail: THERAPIES SERIES
Discharge: HOME | End: 2018-09-04
Attending: SURGERY

## 2018-09-04 DIAGNOSIS — R68.89 IMPAIRED FUNCTION OF UPPER EXTREMITY: ICD-10-CM

## 2018-09-04 DIAGNOSIS — N64.4 MASTODYNIA OF LEFT BREAST: ICD-10-CM

## 2018-09-04 DIAGNOSIS — I97.2 POST-MASTECTOMY LYMPHEDEMA SYNDROME: Primary | ICD-10-CM

## 2018-09-04 DIAGNOSIS — I89.0 LYMPHEDEMA OF RIGHT UPPER EXTREMITY: ICD-10-CM

## 2018-09-04 DIAGNOSIS — L90.5 SCAR CONDITIONS AND FIBROSIS OF SKIN: ICD-10-CM

## 2018-09-04 PROCEDURE — 97140 MANUAL THERAPY 1/> REGIONS: CPT | Performed by: PHYSICAL THERAPIST

## 2018-09-04 NOTE — THERAPY TREATMENT NOTE
Outpatient Physical Therapy Lymphedema Treatment Note  [facility]     Patient Name: Tia Sommers  : 1970  MRN: [id number]  Today's Date: 2018        Visit Date: 2018    Visit Dx:    ICD-10-CM ICD-9-CM   1. 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 Lymphedema of right upper extremity I89.0 457.1   3. Scar conditions and fibrosis of skin L90.5 709.2   4. Mastodynia of left breast N64.4 611.71   5. Impaired function of upper extremity R68.89 V49.5       Patient Active Problem List   Diagnosis   • Breast cancer, left (CMS/HCC)   • Epigastric pain   • Elevated LFTs              Lymphedema     Row Name 18 1310          Able to rate subjective pain? yes  -MW    Pre-Treatment Pain Level 1  -MW    Post-Treatment Pain Level 1  -MW          Subjective Comments Better; has GI tests on Monday next week.  Then has MD appt next Friday to discuss next steps in treatment   -MW          Ptting Edema Category By severity  -MW    Pitting Edema Mild  -MW    Edema Assessment Comment RUE mild; bilateral lateral trunk mild to moderate   -MW          Location/Assessment Upper Extremity;Upper Quadrant  -MW    Upper Extremity Conditions bilateral:;intact;clean;left:;other (comment)   LT multiple tatoos   -MW    Upper Extremity Color/Pigment bilateral:   WNL for ethnicity   -MW    Upper Quadrant Conditions bilateral:;intact  -MW    Upper Quadrant Color/Pigment bilateral:   WNL   -MW          Quick Girth Areas --  -MW          Manual Lymphatic Drainage initial sequence;opened regional lymph nodes;opened anastamoses;extremity treatment;astym  -MW    Initial Sequence supraclavicular;shoulder collectors  -MW    Supraclavicular right;left  -MW    Shoulder Collectors right;left  -MW    Abdomen --   deferred due to fullness/ bloating   -MW    Opened Regional Lymph Nodes inguinal  -MW    Inguinal right;left  -MW    Opened Anastamoses axillo-inguinal  -MW    Axillo-Inguinal right;left  -MW    Extremity  Treatment simple/brief MLD;MLD to proximal limb only  -    Simple/Brief MLD LT and Rt lateral trunk fullness   -MW    MLD to Proximal Limb Only LUE   -MW    MLD to Full Limb RUE   -MW    Astym mastectomy protocol  -    Mastectomy Protocol supine;sitting  -    Mastectomy Protocol Comment Seated on stool with upper body resting on face craddle to focus on Rt & Lt upper back/ scapular area with evaluator and localizer; extra strokes to leveator scap, mid traps and teres mm groups. Increased tightness Rt UT/ leveator scap area. Continued strokes into lateral trunk adjacent to breast area. In supine: Evaluator and localizer to Rt and Lt chest, working around port on Lt. Mild to moderate soft tissue disruptions Rt chest, very mild Lt chest. Continued ASTYM into lateral trunk / ribs; mild soft tissue disruptions bilaterally. Repositioned in shoulder flex/ ABD to open axilla (partially able to achieve position Rt; Lt opening better) and work on pectoralis tightness, soft tissue restrictions with extra strokes with isolator to bilat chest. Isolator star burst around incision lines bilaterally; soft tissue thick adjacent to incision lines.   -MW    Manual Lymphatic Drainage Comments MLD mixed with STM post ASTYM working around trunk with position changes.   -MW          Compression/Skin Care skin care;wrapping location;bandaging;compression garment  -    Skin Care moisturizing lotion applied   residual cocoa butter on trunk   -    Wrapping Location upper extremity  -    Wrapping Location UE right:;hand to axilla  -    Compression Garment Comments Assisted pt to don RUE arm sleeve  -      User Key  (r) = Recorded By, (t) = Taken By, (c) = Cosigned By    Initials Name Provider Type    Jennifer Solorio, PT Physical Therapist                              PT Assessment/Plan     Row Name 09/04/18 1310          PT Assessment    Functional Limitations Performance in self-care ADL;Limitation in home  management;Performance in work activities  -MW     Impairments Edema;Impaired lymphatic circulation;Impaired postural alignment;Muscle strength;Pain;Range of motion;Sensation  -MW     Assessment Comments LUE ROM and flexibility continues to improve; looser post ASTYM and manual therapy. Pt notes Rt shoulder is the best it has been since after her lumpectomy and XRT back in 2000. Rt pectoralis muscle and scar tissue slowly improving though still much more restricted than the Lt. Pt compliant with self care between PT visits. Pt to follow up on insurance appeal process for additional visit to use post XRT.   -MW     Rehab Potential Good  -MW     Patient/caregiver participated in establishment of treatment plan and goals Yes  -MW     Patient would benefit from skilled therapy intervention Yes  -MW        PT Plan    PT Frequency 1x/week  -MW     Physical Therapy Interventions (Optional Details) manual therapy techniques;manual lymphatic drainage;postural re-education;ROM (Range of Motion);stretching;strengthening;home exercise program  -     PT Plan Comments Cont ASTYM/STM, MLD; progress Ther exer marina LT shoulder ROM/ flexibility; consider decrease to 1 x wk if progress continues   -       User Key  (r) = Recorded By, (t) = Taken By, (c) = Cosigned By    Initials Name Provider Type    Jennifer Solorio, PT Physical Therapist                     Exercises     Row Name 09/04/18 1310             Subjective Comments    Subjective Comments Better; has GI tests on Monday next week.  Then has MD appt next Friday to discuss next steps in treatment   -MW         Subjective Pain    Able to rate subjective pain? yes  -MW      Pre-Treatment Pain Level 1  -MW      Post-Treatment Pain Level 1  -MW         Total Minutes    21835 - PT Manual Therapy Minutes 55  -MW         Exercise 1    Exercise Name 1 RT HBH wings   -MW      Reps 1 8  -MW      Additional Comments concurrent with Manual therapy  -MW         Exercise 2     Exercise Name 2 LT HBH wings   -MW      Reps 2 8  -MW      Additional Comments concurrent with Manual therapy  -MW        User Key  (r) = Recorded By, (t) = Taken By, (c) = Cosigned By    Initials Name Provider Type    Jennifer Solorio, PT Physical Therapist                       Manual Rx (last 36 hours)      Manual Treatments     Row Name 09/04/18 1310             Total Minutes    37945 - PT Manual Therapy Minutes 55  -MW         Manual Rx 1    Manual Rx 1 Location Bilat chest / mastectomy sites   -MW      Manual Rx 1 Type STM with tissue bending, lifting and rolling at lateral pectoralis border and at incision lines   -MW      Manual Rx 1 Grade gentle to moderate  -MW      Manual Rx 1 Duration 20  -MW         Manual Rx 2    Manual Rx 2 Location LT < RT upper trap, mid trap, leveator scap and teres mm groups wrapping around into posterior axilla   -MW      Manual Rx 2 Type STM with tissue bending and lifting techniques   -MW      Manual Rx 2 Grade gentle to moderate   -MW      Manual Rx 2 Duration 15  -MW        User Key  (r) = Recorded By, (t) = Taken By, (c) = Cosigned By    Initials Name Provider Type    Jennifer Solorio, PT Physical Therapist              Therapy Education  Given: Edema management, Symptoms/condition management (decrease PT to 1 x wk )  Program: Reinforced  How Provided: Verbal  Provided to: Patient  Level of Understanding: Verbalized              Time Calculation:   Start Time: 1310  Total Timed Code Minutes- PT: 55 minute(s)   Therapy Suggested Charges     Code   Minutes Charges    10353 (CPT®) Hc Pt Neuromusc Re Education Ea 15 Min      24688 (CPT®) Hc Pt Ther Proc Ea 15 Min      34401 (CPT®) Hc Gait Training Ea 15 Min      21044 (CPT®) Hc Pt Therapeutic Act Ea 15 Min      15344 (CPT®) Hc Pt Manual Therapy Ea 15 Min 55 4    84398 (CPT®) Hc Pt Ther Massage- Per 15 Min      93755 (CPT®) Hc Pt Iontophoresis Ea 15 Min      07821 (CPT®) Hc Pt Elec Stim Ea-Per 15 Min      22832 (CPT®) Hc  Pt Ultrasound Ea 15 Min      09206 (CPT®) Hc Pt Self Care/Mgmt/Train Ea 15 Min      20117 (CPT®) Hc Pt Prosthetic (S) Train Initial Encounter, Each 15 Min      58729 (CPT®) Hc Orthotic(S) Mgmt/Train Initial Encounter, Each 15min      52079 (CPT®) Hc Pt Aquatic Therapy Ea 15 Min      99897 (CPT®) Hc Pt Orthotic(S)/Prosthetic(S) Encounter, Each 15 Min      Total  55 4        Therapy Charges for Today     Code Description Service Date Service Provider Modifiers Qty    88961322540 HC PT MANUAL THERAPY EA 15 MIN 9/4/2018 Jennifer Noriega, PT GP 4                    Jennifer Noriega, PT  9/4/2018

## 2018-09-06 RX ORDER — SODIUM, POTASSIUM,MAG SULFATES 17.5-3.13G
1 SOLUTION, RECONSTITUTED, ORAL ORAL TAKE AS DIRECTED
Qty: 2 BOTTLE | Refills: 0 | Status: SHIPPED | OUTPATIENT
Start: 2018-09-06 | End: 2018-09-26

## 2018-09-10 ENCOUNTER — LAB REQUISITION (OUTPATIENT)
Dept: LAB | Facility: HOSPITAL | Age: 48
End: 2018-09-10

## 2018-09-10 ENCOUNTER — OUTSIDE FACILITY SERVICE (OUTPATIENT)
Dept: GASTROENTEROLOGY | Facility: CLINIC | Age: 48
End: 2018-09-10

## 2018-09-10 DIAGNOSIS — R10.10 UPPER ABDOMINAL PAIN: ICD-10-CM

## 2018-09-10 PROCEDURE — 88305 TISSUE EXAM BY PATHOLOGIST: CPT | Performed by: INTERNAL MEDICINE

## 2018-09-10 PROCEDURE — 45385 COLONOSCOPY W/LESION REMOVAL: CPT | Performed by: INTERNAL MEDICINE

## 2018-09-10 PROCEDURE — 43239 EGD BIOPSY SINGLE/MULTIPLE: CPT | Performed by: INTERNAL MEDICINE

## 2018-09-11 LAB
CYTO UR: NORMAL
LAB AP CASE REPORT: NORMAL
LAB AP CLINICAL INFORMATION: NORMAL
PATH REPORT.FINAL DX SPEC: NORMAL
PATH REPORT.GROSS SPEC: NORMAL

## 2018-09-12 ENCOUNTER — DOCUMENTATION (OUTPATIENT)
Dept: ONCOLOGY | Facility: CLINIC | Age: 48
End: 2018-09-12

## 2018-09-12 ENCOUNTER — DOCUMENTATION (OUTPATIENT)
Dept: NEUROLOGY | Facility: CLINIC | Age: 48
End: 2018-09-12

## 2018-09-14 ENCOUNTER — OFFICE VISIT (OUTPATIENT)
Dept: ONCOLOGY | Facility: CLINIC | Age: 48
End: 2018-09-14

## 2018-09-14 ENCOUNTER — INFUSION (OUTPATIENT)
Dept: ONCOLOGY | Facility: HOSPITAL | Age: 48
End: 2018-09-14

## 2018-09-14 VITALS
DIASTOLIC BLOOD PRESSURE: 109 MMHG | WEIGHT: 167 LBS | BODY MASS INDEX: 26.84 KG/M2 | SYSTOLIC BLOOD PRESSURE: 179 MMHG | HEART RATE: 90 BPM | HEIGHT: 66 IN | TEMPERATURE: 97.9 F | OXYGEN SATURATION: 100 % | RESPIRATION RATE: 16 BRPM

## 2018-09-14 DIAGNOSIS — Z17.0 MALIGNANT NEOPLASM OF LEFT BREAST IN FEMALE, ESTROGEN RECEPTOR POSITIVE, UNSPECIFIED SITE OF BREAST (HCC): Primary | ICD-10-CM

## 2018-09-14 DIAGNOSIS — C50.912 MALIGNANT NEOPLASM OF LEFT BREAST IN FEMALE, ESTROGEN RECEPTOR POSITIVE, UNSPECIFIED SITE OF BREAST (HCC): Primary | ICD-10-CM

## 2018-09-14 DIAGNOSIS — R93.89 ABNORMAL CT OF THE CHEST: ICD-10-CM

## 2018-09-14 DIAGNOSIS — C50.212 MALIGNANT NEOPLASM OF UPPER-INNER QUADRANT OF LEFT BREAST IN FEMALE, ESTROGEN RECEPTOR POSITIVE (HCC): ICD-10-CM

## 2018-09-14 DIAGNOSIS — Z17.0 MALIGNANT NEOPLASM OF UPPER-INNER QUADRANT OF LEFT BREAST IN FEMALE, ESTROGEN RECEPTOR POSITIVE (HCC): ICD-10-CM

## 2018-09-14 DIAGNOSIS — L93.0 LUPUS ERYTHEMATOSUS, UNSPECIFIED FORM: ICD-10-CM

## 2018-09-14 PROBLEM — Z86.2 HISTORY OF ITP: Status: ACTIVE | Noted: 2018-09-14

## 2018-09-14 LAB
ALBUMIN SERPL-MCNC: 3.87 G/DL (ref 3.2–4.8)
ALBUMIN/GLOB SERPL: 1.4 G/DL (ref 1.5–2.5)
ALP SERPL-CCNC: 74 U/L (ref 25–100)
ALT SERPL W P-5'-P-CCNC: 25 U/L (ref 7–40)
ANION GAP SERPL CALCULATED.3IONS-SCNC: 4 MMOL/L (ref 3–11)
AST SERPL-CCNC: 37 U/L (ref 0–33)
BILIRUB SERPL-MCNC: 0.5 MG/DL (ref 0.3–1.2)
BUN BLD-MCNC: 7 MG/DL (ref 9–23)
BUN/CREAT SERPL: 9.7 (ref 7–25)
CALCIUM SPEC-SCNC: 8.6 MG/DL (ref 8.7–10.4)
CHLORIDE SERPL-SCNC: 105 MMOL/L (ref 99–109)
CO2 SERPL-SCNC: 29 MMOL/L (ref 20–31)
CREAT BLD-MCNC: 0.72 MG/DL (ref 0.6–1.3)
ERYTHROCYTE [DISTWIDTH] IN BLOOD BY AUTOMATED COUNT: 16.1 % (ref 11.3–14.5)
GFR SERPL CREATININE-BSD FRML MDRD: 104 ML/MIN/1.73
GFR SERPL CREATININE-BSD FRML MDRD: 86 ML/MIN/1.73
GLOBULIN UR ELPH-MCNC: 2.7 GM/DL
GLUCOSE BLD-MCNC: 85 MG/DL (ref 70–100)
HCT VFR BLD AUTO: 34.5 % (ref 34.5–44)
HGB BLD-MCNC: 10.8 G/DL (ref 11.5–15.5)
LYMPHOCYTES # BLD AUTO: 2.1 10*3/MM3 (ref 0.6–4.8)
LYMPHOCYTES NFR BLD AUTO: 42.7 % (ref 24–44)
MCH RBC QN AUTO: 27.1 PG (ref 27–31)
MCHC RBC AUTO-ENTMCNC: 31.4 G/DL (ref 32–36)
MCV RBC AUTO: 86.1 FL (ref 80–99)
MONOCYTES # BLD AUTO: 0.4 10*3/MM3 (ref 0–1)
MONOCYTES NFR BLD AUTO: 9.1 % (ref 0–12)
NEUTROPHILS # BLD AUTO: 2.4 10*3/MM3 (ref 1.5–8.3)
NEUTROPHILS NFR BLD AUTO: 48.2 % (ref 41–71)
PLATELET # BLD AUTO: 413 10*3/MM3 (ref 150–450)
PMV BLD AUTO: 6.9 FL (ref 6–12)
POTASSIUM BLD-SCNC: 4.1 MMOL/L (ref 3.5–5.5)
PROT SERPL-MCNC: 6.6 G/DL (ref 5.7–8.2)
RBC # BLD AUTO: 4 10*6/MM3 (ref 3.89–5.14)
SODIUM BLD-SCNC: 138 MMOL/L (ref 132–146)
WBC NRBC COR # BLD: 4.9 10*3/MM3 (ref 3.5–10.8)

## 2018-09-14 PROCEDURE — 25010000002 CYCLOPHOSPHAMIDE PER 100 MG: Performed by: NURSE PRACTITIONER

## 2018-09-14 PROCEDURE — 80053 COMPREHEN METABOLIC PANEL: CPT

## 2018-09-14 PROCEDURE — 96413 CHEMO IV INFUSION 1 HR: CPT

## 2018-09-14 PROCEDURE — 25010000002 PALONOSETRON PER 25 MCG: Performed by: NURSE PRACTITIONER

## 2018-09-14 PROCEDURE — 85025 COMPLETE CBC W/AUTO DIFF WBC: CPT

## 2018-09-14 PROCEDURE — 25010000002 DOCETAXEL 20 MG/ML SOLUTION 4 ML VIAL: Performed by: NURSE PRACTITIONER

## 2018-09-14 PROCEDURE — 25010000002 HEPARIN FLUSH (PORCINE) 100 UNIT/ML SOLUTION: Performed by: INTERNAL MEDICINE

## 2018-09-14 PROCEDURE — 99214 OFFICE O/P EST MOD 30 MIN: CPT | Performed by: NURSE PRACTITIONER

## 2018-09-14 PROCEDURE — 25010000002 DOCETAXEL 20 MG/ML SOLUTION 1 ML VIAL: Performed by: NURSE PRACTITIONER

## 2018-09-14 PROCEDURE — 96415 CHEMO IV INFUSION ADDL HR: CPT

## 2018-09-14 PROCEDURE — 96375 TX/PRO/DX INJ NEW DRUG ADDON: CPT

## 2018-09-14 PROCEDURE — 96417 CHEMO IV INFUS EACH ADDL SEQ: CPT

## 2018-09-14 RX ORDER — PALONOSETRON 0.05 MG/ML
0.25 INJECTION, SOLUTION INTRAVENOUS ONCE
Status: CANCELLED | OUTPATIENT
Start: 2018-09-14

## 2018-09-14 RX ORDER — PALONOSETRON 0.05 MG/ML
0.25 INJECTION, SOLUTION INTRAVENOUS ONCE
Status: COMPLETED | OUTPATIENT
Start: 2018-09-14 | End: 2018-09-14

## 2018-09-14 RX ORDER — SODIUM CHLORIDE 0.9 % (FLUSH) 0.9 %
10 SYRINGE (ML) INJECTION AS NEEDED
Status: DISCONTINUED | OUTPATIENT
Start: 2018-09-14 | End: 2018-09-14 | Stop reason: HOSPADM

## 2018-09-14 RX ORDER — SODIUM CHLORIDE 9 MG/ML
250 INJECTION, SOLUTION INTRAVENOUS ONCE
Status: CANCELLED | OUTPATIENT
Start: 2018-09-14

## 2018-09-14 RX ORDER — ANASTROZOLE 1 MG/1
1 TABLET ORAL DAILY
Qty: 30 TABLET | Refills: 11 | Status: SHIPPED | OUTPATIENT
Start: 2018-09-14 | End: 2018-12-18

## 2018-09-14 RX ORDER — SODIUM CHLORIDE 0.9 % (FLUSH) 0.9 %
10 SYRINGE (ML) INJECTION AS NEEDED
Status: CANCELLED | OUTPATIENT
Start: 2018-09-14

## 2018-09-14 RX ADMIN — HEPARIN 500 UNITS: 100 SYRINGE at 13:59

## 2018-09-14 RX ADMIN — CYCLOPHOSPHAMIDE 1000 MG: 1 INJECTION, POWDER, FOR SOLUTION INTRAVENOUS; ORAL at 13:21

## 2018-09-14 RX ADMIN — PALONOSETRON HYDROCHLORIDE 0.25 MG: 0.25 INJECTION INTRAVENOUS at 11:56

## 2018-09-14 RX ADMIN — Medication 10 ML: at 13:59

## 2018-09-14 RX ADMIN — SODIUM CHLORIDE 140 MG: 9 INJECTION, SOLUTION INTRAVENOUS at 12:12

## 2018-09-14 NOTE — PROGRESS NOTES
CHIEF COMPLAINT:   1.  Follow-up for breast cancer during adjuvant therapy                                             Problem List:  Oncology/Hematology History    1.  Stage IIA left breast cancer  2. History of right breast cancer 1998, data deficient  3. Lupus  4.  ITP: History of refractory ITP, failed splenectomy, steroids, IVIG and Decadron.  Last received Rituxan August 2013        Malignant neoplasm of upper-inner quadrant of left breast in female, estrogen receptor positive (CMS/HCC)    5/16/2018 Initial Diagnosis     1.) Stage IIa [id number] Breast cancer, left: Had bilateral mastectomy on 5/16/18 with benign disease on the right.  The left breast had infiltrating lobular carcinoma, Bloom Gutierrez 6 out of 9, 2 foci largest being 4 cm, 4 total nodes and 2 sentinel nodes taken all negative.  There was a positive superficial margin.  Biopsy on 3/28/18 of abnormality found on screening mammogram was 95% 3+ positive ER and 95% 3+ positive AK and HER-2/taiwo 0+.  Baseline liver enzymes abnormal ALT 79 AST 55.    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  CHEK2 also causes an increased risk for colon cancer (up to 9.5% lifetime risk).           6/18/2018 -  Other Event     Oncotype score 28 with 18% 10 year risk of distant recurrence on tamoxifen alone.  There is about a 6% lower risk of distant recurrence when chemotherapy is added to the hormonal blockade per this prediction.  The Oncotype showed her to be ER positive and AK negative and HER-2/taiwo negative.         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  Patient was started on Valtrex after  presenting to the emergency room on 7/11/2018, subsequently placed her on Famvir prophylaxis           8/14/2018 Imaging     Bilateral Lower Extremity Venous Doppler study negative for DVT.         8/15/2018 Imaging     PET/CT IMPRESSION:  1. Shotty nodes in the anterior mediastinal fat, weakly hypermetabolic,  and of only questionable significance. Imaging follow-up is suggested to  evaluate for stability.  2. Expected activity at the patient's left-sided port injection site.  Otherwise negative PET scan.            HISTORY OF PRESENT ILLNESS:  The patient is a 48 y.o. female, here for follow up on management of adjuvant therapy of breast cancer.  Liver enzymes continue to be normal.  She is here today for her last cycle of chemotherapy.  States that her knees have been bothering her.  She does have a history of lupus and has not followed up with anyone were quite some time.  She saw Dr. Elton Billy in the past.  Had colonoscopy and EGD since we saw her last.      Past Medical History:   Diagnosis Date   • Arthritis     minna knees and hands   • Breast cancer (CMS/HCC) 2000    dx in 1998;  2018   • Drug therapy 2000   • Hx of radiation therapy 2000   • Lupus 2014   • Lymphedema     right arm     Past Surgical History:   Procedure Laterality Date   • BONE BIOPSY      back   • BREAST BIOPSY Right    • BREAST LUMPECTOMY Right 2000   • COLONOSCOPY      > 10 years   • DIAGNOSTIC LAPAROSCOPY EXPLORATORY LAPAROTOMY     • ENDOSCOPY     • MASTECTOMY W/ SENTINEL NODE BIOPSY Bilateral 5/16/2018    Procedure: BREAST MASTECTOMY BILATERAL WITH LEFT SENTINEL NODE BIOPSY;  Surgeon: Keven Dye MD;  Location: [facility];  Service: General   • SPLENECTOMY     • VENOUS ACCESS DEVICE (PORT) INSERTION     • VENOUS ACCESS DEVICE (PORT) REMOVAL  2003       Allergies   Allergen Reactions   • Iodine Anaphylaxis   • Penicillins Anaphylaxis   • Latex Rash       Family History and Social History reviewed and changed as  "necessary      REVIEW OF SYSTEM:   Review of Systems   Constitutional: Negative for appetite change, chills, diaphoresis, fatigue, fever and unexpected weight change.   HENT:   Negative for mouth sores, sore throat and trouble swallowing.    Eyes: Negative for icterus.   Respiratory: Negative for cough, hemoptysis and shortness of breath.    Cardiovascular: Negative for chest pain, leg swelling and palpitations.   Gastrointestinal: Negative for abdominal distention, blood in stool, constipation, diarrhea, nausea and vomiting.  positive for intermittent abdominal/midepigastric pain.  Endocrine: Negative for hot flashes.   Genitourinary: Negative for bladder incontinence, difficulty urinating, dysuria, frequency and hematuria.    Musculoskeletal: Negative for gait problem, neck pain and neck stiffness.  positive for joint pain and stiffness particularly in her knees.  Skin: Negative for rash.   Neurological: Negative for dizziness, gait problem, headaches, light-headedness and numbness.   Hematological: Negative for adenopathy. Does not bruise/bleed easily.   Psychiatric/Behavioral: Negative for depression. The patient is not nervous/anxious.    All other systems reviewed and are negative.       PHYSICAL EXAM    Vitals:    09/14/18 0959   BP: (!) 179/109   Pulse: 90   Resp: 16   Temp: 97.9 °F (36.6 °C)   SpO2: 100%   Weight: 75.8 kg (167 lb)   Height: 167.6 cm (66\")     Constitutional: Appears well-developed and well-nourished. No distress.   ECOG: (0) Fully active, able to carry on all predisease performance without restriction  HENT:   Head: Normocephalic.   Mouth/Throat: Oropharynx is clear and moist.   Eyes: Conjunctivae are normal. Pupils are equal, round, and reactive to light. No scleral icterus.   Neck: Neck supple. No JVD present. No thyromegaly present.   Cardiovascular: Normal rate, regular rhythm and normal heart sounds.    Pulmonary/Chest: Breath sounds normal. No respiratory distress.   Abdominal: Soft. " Exhibits no distension and no mass. There is no hepatosplenomegaly. There is no tenderness. There is no rebound and no guarding.   Musculoskeletal:Exhibits no edema, tenderness or deformity.   Neurological: Alert and oriented to person, place, and time. Exhibits normal muscle tone.   Skin: No ecchymosis, no petechiae and no rash noted. Not diaphoretic. No cyanosis. Nails show no clubbing.   Psychiatric: Normal mood and affect.   Vitals reviewed.  Laboratory data reviewed.      ASSESSMENT & PLAN:    1. Stage II hormone receptor positive breast cancer  2. Liver enzyme elevation  3. HSV vaginal infection  4. History of lupus    Discussion: Had an EGD and colonoscopy on 9/10/2018, showed esophagitis and 2 benign polyps removed on colonoscopy.  She continues to have intermittent abdominal pain but not worsening.  Her liver enzymes have been normal on the last 2 lab draws. We will continue with her 4th and final course ofTaxotere Cytoxan today.  We will get her back to radiation oncology for radiation.  I will start her on hormonal blockade with Arimidex and Lupron.  She had been having periods up until chemotherapy started, she has been spotting but has not had menses in the last few months.  We discussed side effects including but not limited to mood swings, hot flashes, decreased bone density.  Prescription was sent for Arimidex to her pharmacy, she will return to the infusion center next month to start Lupron.  Though I think the anterior mediastinal fat pad adenopathy is unlikely to be anything, I will check a CT in about 6 weeks to be sure that there is nothing progressing there.  She has not seen anyone regarding her lupus as she had insurance issues previously.  I will get her back to Dr. Billy for follow up.  We will see her back in 2 months for follow-up.      I spent 25 minutes with the patient. I spent > 50% percent of this time counseling and discussing prognosis, diagnostic testing, evaluation, current  status and management.    Nuvia nAdrews, APRN    09/14/2018

## 2018-09-18 ENCOUNTER — HOSPITAL ENCOUNTER (OUTPATIENT)
Dept: PHYSICAL THERAPY | Facility: HOSPITAL | Age: 48
Setting detail: THERAPIES SERIES
Discharge: HOME | End: 2018-09-18
Attending: SURGERY

## 2018-09-18 DIAGNOSIS — L90.5 SCAR CONDITIONS AND FIBROSIS OF SKIN: ICD-10-CM

## 2018-09-18 DIAGNOSIS — I89.0 LYMPHEDEMA OF RIGHT UPPER EXTREMITY: ICD-10-CM

## 2018-09-18 DIAGNOSIS — R68.89 IMPAIRED FUNCTION OF UPPER EXTREMITY: ICD-10-CM

## 2018-09-18 DIAGNOSIS — I97.2 POST-MASTECTOMY LYMPHEDEMA SYNDROME: Primary | ICD-10-CM

## 2018-09-18 DIAGNOSIS — N64.4 MASTODYNIA OF LEFT BREAST: ICD-10-CM

## 2018-09-18 PROCEDURE — 97140 MANUAL THERAPY 1/> REGIONS: CPT | Performed by: PHYSICAL THERAPIST

## 2018-09-18 NOTE — THERAPY TREATMENT NOTE
"    Outpatient Physical Therapy Lymphedema Treatment Note  [facility]     Patient Name: Tia Sommers  : 1970  MRN: [id number]  Today's Date: 2018        Visit Date: 2018    Visit Dx:    ICD-10-CM ICD-9-CM   1. 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 Lymphedema of right upper extremity I89.0 457.1   3. Scar conditions and fibrosis of skin L90.5 709.2   4. Mastodynia of left breast N64.4 611.71   5. Impaired function of upper extremity R68.89 V49.5       Patient Active Problem List   Diagnosis   • Malignant neoplasm of upper-inner quadrant of left breast in female, estrogen receptor positive (CMS/HCC)   • Epigastric pain   • Elevated LFTs   • Abnormal CT of the chest   • Malignant neoplasm of left breast in female, estrogen receptor positive (CMS/HCC)   • History of ITP   • Lupus              Lymphedema     Row Name 18 1300          Able to rate subjective pain? yes  -MW    Pre-Treatment Pain Level 1  -MW    Post-Treatment Pain Level 1  -MW    Subjective Pain Comment more tender in the \"pockets\" still   -MW          Subjective Comments Traveled over the weekend, didn't have pump so RUE still a little puffy even though used the sleeve every day. Lt wrist also seems more puffy   -MW          Ptting Edema Category By severity  -MW    Pitting Edema Mild;Moderate  -MW    Edema Assessment Comment RUE mild to moderate; Lt wrist / distal forearm mild to moderate soft puffy, non pitting edema. Also notes tenderness around LT wrist area (previous injury too).   -MW          Location/Assessment Upper Extremity;Upper Quadrant  -MW    Upper Extremity Conditions bilateral:;intact;clean;left:;other (comment)   LT multiple tatoos   -MW    Upper Extremity Color/Pigment bilateral:;normal  -MW    Upper Quadrant Conditions bilateral:;intact  -MW    Upper Quadrant Color/Pigment left:;normal;right:;hyperpigmented   area of XRT \"tan\"   -MW    Skin Observations Comment bilat mastectomy incision " lines with mild soft tissue restrictions, Rt more restricted than Lt   -MW          Mid forearm 21 cm  -MW    Wrist crease 15.5 cm  -MW          Manual Lymphatic Drainage initial sequence;opened regional lymph nodes;opened anastamoses;extremity treatment;astym  -MW    Initial Sequence supraclavicular;shoulder collectors  -MW    Supraclavicular right;left  -MW    Shoulder Collectors right;left  -MW    Abdomen --   deferred due to fullness/ bloating   -MW    Opened Regional Lymph Nodes inguinal  -MW    Inguinal right;left  -MW    Opened Anastamoses axillo-inguinal  -MW    Axillo-Inguinal right;left  -MW    Extremity Treatment simple/brief MLD;MLD to full limb  -MW    Simple/Brief MLD LUE   -MW    MLD to Full Limb RUE   -MW    Astym mastectomy protocol  -    Mastectomy Protocol supine;sitting  -    Mastectomy Protocol Comment Seated on stool with upper body resting on face craddle to focus on Rt & Lt upper back/ scapular area with evaluator and localizer; extra strokes to leveator scap, mid traps and teres mm groups. Increased tightness Rt UT/ leveator scap area. Continued strokes into lateral trunk adjacent to breast area. In supine: Evaluator and localizer to Rt and Lt chest, working around port on Lt. Mild to moderate soft tissue disruptions Rt chest, very mild Lt chest. Continued ASTYM into lateral trunk / ribs; mild soft tissue disruptions on Rt, Lt smooth. Repositioned in shoulder flex/ ABD to open axilla (partially able to achieve position Rt; Lt opening better) and work on pectoralis tightness, soft tissue restrictions with extra strokes with isolator to bilat chest. Isolator star burst around incision lines bilaterally; soft tissue thick adjacent to incision lines.   -MW    Manual Lymphatic Drainage Comments MLD mixed with STM post ASTYM working around trunk with position changes.   -MW          Compression/Skin Care skin care;wrapping location;bandaging;compression garment  -    Skin Care moisturizing  lotion applied   residual cocoa butter on trunk   -    Wrapping Location upper extremity  -    Wrapping Location UE right:;hand to axilla  -    Compression Garment Comments Assisted pt to don RUE arm sleeve  -    Compression/Skin Care Comments Issued size 14  compressogrip as trial / temporary trunk compression   -      User Key  (r) = Recorded By, (t) = Taken By, (c) = Cosigned By    Initials Name Provider Type     Jennifer Noriega, PT Physical Therapist                              PT Assessment/Plan     Row Name 09/18/18 1300          PT Assessment    Functional Limitations Performance in self-care ADL;Limitation in home management;Performance in work activities  -     Impairments Edema;Impaired lymphatic circulation;Impaired postural alignment;Muscle strength;Pain;Range of motion;Sensation  -     Assessment Comments Pt returns after 2 weeks between visits; soft tissue mobility continues to be restricted but pt has done well to maintain herself in between visits. RUE visibly larger but pt was OOT without her pump; expect arm to reduce once she is back on daily pumping routine. Mild soft, non-pitting swelling of LT wrist / distal forearm, improved post MLD. Pt continues to benefit from PT for manual techniques to decrease soft tissue restriction and disruptions as well as to improve flexibility and ROM for continued function. Will monitor skin as she prepares to begin XRT soon.   -     Rehab Potential Good  -     Patient/caregiver participated in establishment of treatment plan and goals Yes  -     Patient would benefit from skilled therapy intervention Yes  -MW        PT Plan    PT Frequency 1x/week  -     Physical Therapy Interventions (Optional Details) manual therapy techniques;manual lymphatic drainage;postural re-education;ROM (Range of Motion);stretching;strengthening;home exercise program  -     PT Plan Comments Cont ASTYM/STM, MLD; progress Ther exer marina LT shoulder ROM/  "flexibility  -MW       User Key  (r) = Recorded By, (t) = Taken By, (c) = Cosigned By    Initials Name Provider Type    MW Jennifer Noriega, PT Physical Therapist                     Exercises     Row Name 09/18/18 1300             Subjective Comments    Subjective Comments Traveled over the weekend, didn't have pump so RUE still a little puffy even though used the sleeve every day. Lt wrist also seems more puffy   -MW         Subjective Pain    Able to rate subjective pain? yes  -MW      Pre-Treatment Pain Level 1  -MW      Post-Treatment Pain Level 1  -MW      Subjective Pain Comment more tender in the \"pockets\" still   -MW         Total Minutes    86524 - PT Manual Therapy Minutes 80  -MW         Exercise 1    Exercise Name 1 RT HBH wings   -MW      Reps 1 6  -MW      Additional Comments concurrent with Manual therapy  -MW         Exercise 2    Exercise Name 2 LT HBH wings   -MW      Reps 2 9  -MW      Additional Comments concurrent with Manual therapy  -MW        User Key  (r) = Recorded By, (t) = Taken By, (c) = Cosigned By    Initials Name Provider Type    MW Jennifer Noriega, PT Physical Therapist                       Manual Rx (last 36 hours)      Manual Treatments     Row Name 09/18/18 1300             Total Minutes    51718 - PT Manual Therapy Minutes 80  -MW         Manual Rx 1    Manual Rx 1 Location Bilat chest / mastectomy sites   -MW      Manual Rx 1 Type STM with tissue bending, lifting, rolling and space correction techniques at lateral pectoralis border and at incision lines   -MW      Manual Rx 1 Grade gentle to moderate  -MW      Manual Rx 1 Duration 30  -MW         Manual Rx 2    Manual Rx 2 Location LT < RT upper trap, mid trap, leveator scap and teres mm groups wrapping around into posterior axilla   -MW      Manual Rx 2 Type STM with tissue bending and lifting techniques   -MW      Manual Rx 2 Grade gentle to moderate   -MW      Manual Rx 2 Duration 15  -MW        User Key  (r) = Recorded " By, (t) = Taken By, (c) = Cosigned By    Initials Name Provider Type    MW Jennifer Noriega, PT Physical Therapist              Therapy Education  Given: Edema management, Symptoms/condition management, HEP (decrease PT to 1 x wk )  Program: Reinforced  How Provided: Verbal  Provided to: Patient  Level of Understanding: Verbalized              Time Calculation:   Start Time: 1300  Total Timed Code Minutes- PT: 75 minute(s)   Therapy Suggested Charges     Code   Minutes Charges    89821 (CPT®) Hc Pt Neuromusc Re Education Ea 15 Min      04278 (CPT®) Hc Pt Ther Proc Ea 15 Min      86158 (CPT®) Hc Gait Training Ea 15 Min      75736 (CPT®) Hc Pt Therapeutic Act Ea 15 Min      68542 (CPT®) Hc Pt Manual Therapy Ea 15 Min 80 5    17524 (CPT®) Hc Pt Ther Massage- Per 15 Min      36271 (CPT®) Hc Pt Iontophoresis Ea 15 Min      77965 (CPT®) Hc Pt Elec Stim Ea-Per 15 Min      93884 (CPT®) Hc Pt Ultrasound Ea 15 Min      71686 (CPT®) Hc Pt Self Care/Mgmt/Train Ea 15 Min      39643 (CPT®) Hc Pt Prosthetic (S) Train Initial Encounter, Each 15 Min      82924 (CPT®) Hc Orthotic(S) Mgmt/Train Initial Encounter, Each 15min      69751 (CPT®) Hc Pt Aquatic Therapy Ea 15 Min      54611 (CPT®) Hc Pt Orthotic(S)/Prosthetic(S) Encounter, Each 15 Min      Total  80 5        Therapy Charges for Today     Code Description Service Date Service Provider Modifiers Qty    99502408689 HC PT MANUAL THERAPY EA 15 MIN 9/18/2018 Jennifer Noriega, PT GP 5                    Jennifer Noriega, PT  9/18/2018

## 2018-09-20 ENCOUNTER — HOSPITAL ENCOUNTER (OUTPATIENT)
Dept: PHYSICAL THERAPY | Facility: HOSPITAL | Age: 48
Setting detail: THERAPIES SERIES
Discharge: HOME | End: 2018-09-20
Attending: SURGERY

## 2018-09-20 DIAGNOSIS — I89.0 LYMPHEDEMA OF LEFT UPPER EXTREMITY: ICD-10-CM

## 2018-09-20 DIAGNOSIS — L90.5 SCAR CONDITIONS AND FIBROSIS OF SKIN: ICD-10-CM

## 2018-09-20 DIAGNOSIS — R68.89 IMPAIRED FUNCTION OF UPPER EXTREMITY: ICD-10-CM

## 2018-09-20 DIAGNOSIS — N64.4 MASTODYNIA OF LEFT BREAST: ICD-10-CM

## 2018-09-20 DIAGNOSIS — I89.0 LYMPHEDEMA OF RIGHT UPPER EXTREMITY: ICD-10-CM

## 2018-09-20 DIAGNOSIS — I97.2 POST-MASTECTOMY LYMPHEDEMA SYNDROME: Primary | ICD-10-CM

## 2018-09-20 PROCEDURE — 97140 MANUAL THERAPY 1/> REGIONS: CPT | Performed by: PHYSICAL THERAPIST

## 2018-09-20 NOTE — THERAPY PROGRESS REPORT/RE-CERT
"    Outpatient Physical Therapy Lymphedema Progress Note  [facility]     Patient Name: Tia Sommers  : 1970  MRN: [id number]  Today's Date: 2018        Visit Date: 2018    Visit Dx:    ICD-10-CM ICD-9-CM   1. 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 Lymphedema of right upper extremity I89.0 457.1   3. Scar conditions and fibrosis of skin L90.5 709.2   4. Mastodynia of left breast N64.4 611.71   5. Impaired function of upper extremity R68.89 V49.5   6. Lymphedema of left upper extremity I89.0 457.1       Patient Active Problem List   Diagnosis   • Malignant neoplasm of upper-inner quadrant of left breast in female, estrogen receptor positive (CMS/HCC)   • Epigastric pain   • Elevated LFTs   • Abnormal CT of the chest   • Malignant neoplasm of left breast in female, estrogen receptor positive (CMS/HCC)   • History of ITP   • Lupus              Lymphedema     Row Name 18 1500          Ptting Edema Category By severity  -MW    Pitting Edema Mild;Moderate  -MW    Edema Assessment Comment RUE mild to moderate; Lt wrist / distal forearm mild to moderate soft puffy, non pitting edema.   -MW          Location/Assessment Upper Extremity;Upper Quadrant  -MW    Upper Extremity Conditions bilateral:;intact;clean;left:;other (comment)   LT multiple tatoos   -MW    Upper Extremity Color/Pigment bilateral:;normal  -MW    Upper Quadrant Conditions bilateral:;intact  -MW    Upper Quadrant Color/Pigment left:;normal;right:;hyperpigmented   area of XRT \"tan\"   -MW    Skin Observations Comment Bilat mastectomy incision lines with lateral and medial soft tissue puffiness; LT > RT. Rt chest soft tissue restrictions >>Lt   -MW          Lymphedema Sensation Comments Remains tender/ more sensitive to pressure at lateral apect of both incision lines \"Pockets\" areas   -MW          Quick Girth Areas Upper extremities  -MW    Volumetric Areas Upper extremities  -MW          Axilla 30.8 cm  -MW    Mid " upper arm 31.2 cm  -MW    Elbow 25.7 cm  -MW    Mid forearm 22.3 cm  -MW    Wrist crease 15.8 cm  -MW    Web space 18.6 cm  -MW    Met-heads 18.3 cm  -MW    LUE Quick Girth Total 162.7  -MW          Axilla 30 cm  -MW    Mid upper arm 31.3 cm  -MW    Elbow 26.6 cm  -MW    Mid forearm 24.4 cm  -MW    Wrist crease 15.4 cm  -MW    Web space 19.3 cm  -MW    Met-heads 18.3 cm  -MW    RUE Quick Girth Total 165.3  -MW          Manual Lymphatic Drainage initial sequence;opened regional lymph nodes;opened anastamoses;extremity treatment;astym  -MW    Initial Sequence supraclavicular;shoulder collectors  -MW    Supraclavicular right;left  -MW    Shoulder Collectors right;left  -MW    Abdomen --   deferred due to fullness/ bloating   -MW    Opened Regional Lymph Nodes inguinal  -MW    Inguinal right;left  -MW    Opened Anastamoses axillo-inguinal  -MW    Axillo-Inguinal right;left  -MW    Extremity Treatment MLD to full limb  -MW    MLD to Full Limb LT and RT UE  -MW    Astym mastectomy protocol  -    Mastectomy Protocol supine;sitting  -    Mastectomy Protocol Comment Seated on stool with upper body resting on face craddle to focus on Rt & Lt upper back/ scapular area with evaluator and localizer; extra strokes to leveator scap, mid traps and teres mm groups. Increased tightness Rt and Lt UT/ leveator scap area. Continued strokes into lateral trunk adjacent to breast area. In supine: Evaluator and localizer to Rt and Lt chest, working around port on Lt. Mild to moderate soft tissue disruptions Rt chest, very mild Lt chest. Continued ASTYM into lateral trunk / ribs; mild soft tissue disruptions on Rt, Lt smooth. Repositioned in shoulder flex/ ABD to open axilla (partially able to achieve position bilat) and work on pectoralis tightness, soft tissue restrictions with extra strokes with isolator to bilat chest. Isolator star burst around incision lines bilaterally; soft tissue thick adjacent to incision lines.   -MW    Manual  "Lymphatic Drainage Comments MLD mixed with STM post ASTYM working around trunk with position changes.   -MW          Compression/Skin Care skin care;wrapping location;bandaging;compression garment  -MW    Skin Care moisturizing lotion applied   residual cocoa butter on trunk   -MW    Wrapping Location upper extremity  -MW    Wrapping Location UE right:;hand to axilla  -MW    Compression Garment Comments Assisted pt to don RUE arm sleeve  -MW      User Key  (r) = Recorded By, (t) = Taken By, (c) = Cosigned By    Initials Name Provider Type    MW Jennifer Noriega, PT Physical Therapist                  PT Ortho     Row Name 09/20/18 1500       Subjective Comments    Subjective Comments Feeling pretty good; nothing new since last visit. Going OOT Friday through next Tues then MD appts next week. Still waiting to follow up with Rheumatology about her lupus   -MW       Subjective Pain    Able to rate subjective pain? yes  -MW    Pre-Treatment Pain Level 1  -MW    Post-Treatment Pain Level 1  -MW    Subjective Pain Comment tender in \"pockets\"   -MW       Posture/Observations    Posture/Observations Comments 2+ to 3+ finger width from table   -MW       Right Upper Ext    Rt Shoulder Abduction AROM 0-132  -MW    Rt Shoulder Flexion AROM 0-138; approx 150 when AAROM bilat   -MW    Rt Shoulder Internal Rotation AROM hand to mid back   -MW    Rt Upper Extremity Comments  Neural tension for median N produces tension in forearm and shoulder; Ulnar position mild tightness; Radial negative   -MW       Left Upper Ext    Lt Shoulder Abduction AROM 0-110  -MW    Lt Shoulder Flexion AROM 0-145  -MW    Lt Shoulder Internal Rotation AROM Hand to mid T spine   -MW    Lt Upper Extremity Comments  Neural tension for median N produces \"pulling\" sensation on dorsum of wrist and distal forearm/ back of hand; Ulnar position mild tightness; radial negative   -MW      User Key  (r) = Recorded By, (t) = Taken By, (c) = Cosigned By    Initials " Name Provider Type    MW Jennifer Noriega, PT Physical Therapist                        PT Assessment/Plan     Row Name 09/20/18 1500          Functional Limitations Performance in self-care ADL;Limitation in home management;Performance in work activities  -    Impairments Edema;Impaired lymphatic circulation;Impaired postural alignment;Muscle strength;Pain;Range of motion;Sensation  -    Assessment Comments RUE lymphedema stable, back to baseline with pump and compression sleeve use daily. LUE with mild fullness noted in upper arm and wrist areas; pt has initiated pump use on LUE as well. Will need to monitor closely as she begins XRT soon. Lt shoulder flexion and ABD continue to demonstrate improved ROM; Rt shoulder with improved ABD ROM but flexion stable. Rt chest with more soft tissue disruptions and restrictions compared to LT as is expected for h/o XRT. Noted Rt lateral chest, inferior-lateral pectoralis border with area of thick, rough and tender tissue. Will have MD check at next visit. Overall pt continuing to benefit from skilled PT at 1 x wk; will monitor LT UE swelling, skin condition and ROM as she begins XRT and will attempt to minimize loss of function. Pt to continue with lymph pump, compression sleeve use and HEP.   -MW    Rehab Potential Good  -MW    Patient/caregiver participated in establishment of treatment plan and goals Yes  -MW    Patient would benefit from skilled therapy intervention Yes  -MW          PT Frequency 1x/week  -MW    Predicted Duration of Therapy Intervention (Therapy Eval) 12 weeks with modifications for XRT   -    Planned CPT's? PT MANUAL THERAPY EA 15 MIN: 84742;PT THER PROC EA 15 MIN: 57750  -    Physical Therapy Interventions (Optional Details) manual therapy techniques;manual lymphatic drainage;postural re-education;ROM (Range of Motion);stretching;strengthening;home exercise program  -    PT Plan Comments Cont ASTYM/STM, MLD; progress Ther exer marina LT  "shoulder ROM/ flexibility. Monitor LUE, may need to obtain compression sleeve if edema progresses.   -MW      User Key  (r) = Recorded By, (t) = Taken By, (c) = Cosigned By    Initials Name Provider Type    Jennifer Solorio PT Physical Therapist                     Exercises     Row Name 09/20/18 1500             Subjective Comments    Subjective Comments Feeling pretty good; nothing new since last visit. Going OOT Friday through next Tues then MD appts next week. Still waiting to follow up with Rheumatology about her lupus   -MW         Subjective Pain    Able to rate subjective pain? yes  -MW      Pre-Treatment Pain Level 1  -MW      Post-Treatment Pain Level 1  -MW      Subjective Pain Comment tender in \"pockets\"   -MW         Total Minutes    87300 - PT Therapeutic Exercise Minutes 5  -MW      67717 - PT Manual Therapy Minutes 80  -MW         Exercise 1    Exercise Name 1 RT HBH wings   -MW      Reps 1 9  -MW      Additional Comments concurrent with Manual therapy  -MW         Exercise 2    Exercise Name 2 LT HBH wings   -MW      Reps 2 9  -MW      Additional Comments concurrent with Manual therapy  -MW         Exercise 3    Exercise Name 3 Hands behind back pec and chest opener   -MW      Cueing 3 Demo;Verbal  -MW      Reps 3 2  -MW      Additional Comments Deep breath with hold to increase stretch   -MW        User Key  (r) = Recorded By, (t) = Taken By, (c) = Cosigned By    Initials Name Provider Type    Jennifer Solorio PT Physical Therapist                       Manual Rx (last 36 hours)      Manual Treatments     Row Name 09/20/18 1500             Total Minutes    98906 - PT Manual Therapy Minutes 80  -MW         Manual Rx 1    Manual Rx 1 Location Bilat chest / mastectomy sites   -MW      Manual Rx 1 Type STM with tissue bending, lifting, rolling and space correction techniques at lateral pectoralis border and at incision lines   -MW      Manual Rx 1 Grade gentle to moderate  -MW      Manual Rx " 1 Duration 30  -MW         Manual Rx 2    Manual Rx 2 Location LT < RT upper trap, mid trap, leveator scap and teres mm groups wrapping around into posterior axilla   -MW      Manual Rx 2 Type STM with tissue bending and lifting techniques   -MW      Manual Rx 2 Grade gentle to moderate   -MW      Manual Rx 2 Duration 20  -MW        User Key  (r) = Recorded By, (t) = Taken By, (c) = Cosigned By    Initials Name Provider Type    MW Jennifer Noriega, PT Physical Therapist                PT OP Goals     Row Name 09/20/18 1500          STG 1 Pt independent with basic HEP for shoulder ROM and posture.   -MW    STG 1 Progress Met  -MW    STG 2 RUE lymphedema to decrease at least 10%.   -MW    STG 2 Progress Partially Met  -MW    STG 3 LT shoulder flexibility to improve at least 15 degrees to improve self care and prepare for positioning for XRT.   -MW    STG 3 Progress Met  -MW    STG 4 Pt to report decreased pain ratings on DASH to 3/5 or less.   -MW    STG 4 Progress Met  -MW          LTG 1 Pt independent with advanced HEP for posture, flexibility and strengthening to promote safe self care.   -MW    LTG 1 Progress Ongoing;Progressing  -MW    LTG 2 Pt to demonstrate improved function with at least 20% improvement on DASH score.   -MW    LTG 2 Progress Met  -MW    LTG 3 Bilat shoulder flexion to be greater than 160 deg, for pt to be able to achieve positioning for XRT.   -MW    LTG 3 Progress Ongoing;Progressing  -MW    LTG 3 Progress Comments Bilat shoulder flexion improving on LT more than Rt; LT now greater than RT. Suspect Rt will remain more restricted due to h/o XRT; will need to focus on Lt shoulder as XRT begins on LT.   -MW    LTG 4 Pt measured and fit with RUE compression sleeve/ glove and Lt prn.   -MW    LTG 4 Progress Met  -MW    LTG 5 Pt to be independent with self lymphedema care; including use of compression pump, compression sleeves/ gloves and skin care.   -MW    LTG 5 Progress Progressing;Partially  Met  -MW          PT Goal Re-Cert Due Date 12/20/18  -MW      User Key  (r) = Recorded By, (t) = Taken By, (c) = Cosigned By    Initials Name Provider Type    Jennifer Solorio, PT Physical Therapist          Therapy Education  Education Details: Added hands behind back pec and chest stretch; cont pump and sleeve use while traveling   Given: HEP, Edema management  Program: Progressed, Reinforced  How Provided: Verbal, Demonstration  Provided to: Patient  Level of Understanding: Verbalized, Demonstrated, Teach back education performed              Time Calculation:   Start Time: 1500  Total Timed Code Minutes- PT: 85 minute(s)   Therapy Suggested Charges     Code   Minutes Charges    60293 (CPT®) Hc Pt Neuromusc Re Education Ea 15 Min      62633 (CPT®) Hc Pt Ther Proc Ea 15 Min 5 1    13107 (CPT®) Hc Gait Training Ea 15 Min      10656 (CPT®) Hc Pt Therapeutic Act Ea 15 Min      00626 (CPT®) Hc Pt Manual Therapy Ea 15 Min 80 5    61288 (CPT®) Hc Pt Ther Massage- Per 15 Min      54558 (CPT®) Hc Pt Iontophoresis Ea 15 Min      77169 (CPT®) Hc Pt Elec Stim Ea-Per 15 Min      22503 (CPT®) Hc Pt Ultrasound Ea 15 Min      52086 (CPT®) Hc Pt Self Care/Mgmt/Train Ea 15 Min      50108 (CPT®) Hc Pt Prosthetic (S) Train Initial Encounter, Each 15 Min      36369 (CPT®) Hc Orthotic(S) Mgmt/Train Initial Encounter, Each 15min      28248 (CPT®) Hc Pt Aquatic Therapy Ea 15 Min      45515 (CPT®) Hc Pt Orthotic(S)/Prosthetic(S) Encounter, Each 15 Min      Total  85 6        Therapy Charges for Today     Code Description Service Date Service Provider Modifiers Qty    06283086155 HC PT MANUAL THERAPY EA 15 MIN 9/20/2018 Jennifer Noriega, PT GP 5                    Jennifer Noriega, PT  9/20/2018

## 2018-09-25 ENCOUNTER — TELEPHONE (OUTPATIENT)
Dept: GASTROENTEROLOGY | Facility: CLINIC | Age: 48
End: 2018-09-25

## 2018-09-25 DIAGNOSIS — K21.00 GASTRO-ESOPHAGEAL REFLUX DISEASE WITH ESOPHAGITIS: Primary | ICD-10-CM

## 2018-09-25 RX ORDER — PANTOPRAZOLE SODIUM 40 MG/1
40 TABLET, DELAYED RELEASE ORAL EVERY MORNING
Qty: 30 TABLET | Refills: 2 | Status: SHIPPED | OUTPATIENT
Start: 2018-09-25 | End: 2018-09-26

## 2018-09-25 NOTE — TELEPHONE ENCOUNTER
Sylvain--I tried to call, but no answer.  Left VM  · Pls let pt know that EGD pathology shows chronic esophagitis/ gastritis (inflammation of esophagus/stomach).  No other abnormality.   · I'm sending in rx for Protonix  · Colonoscopy showed benign polyp.    · Repeat colonoscopy in 5 yrs.  · Keep f/u w/ me in 11/2018

## 2018-09-25 NOTE — TELEPHONE ENCOUNTER
----- Message from Andrea Osuna MD sent at 9/17/2018  9:39 PM EDT -----  Domitila:   There was 1 adenoma type polyp. She should have a repeat exam in 5 years. There was some changes of acid reflux on the biopsies.

## 2018-09-26 ENCOUNTER — OFFICE VISIT (OUTPATIENT)
Dept: RADIATION ONCOLOGY | Facility: HOSPITAL | Age: 48
End: 2018-09-26

## 2018-09-26 ENCOUNTER — HOSPITAL ENCOUNTER (OUTPATIENT)
Dept: RADIATION ONCOLOGY | Facility: HOSPITAL | Age: 48
Discharge: HOME | End: 2018-09-26

## 2018-09-26 ENCOUNTER — HOSPITAL ENCOUNTER (OUTPATIENT)
Dept: RADIATION ONCOLOGY | Facility: HOSPITAL | Age: 48
Setting detail: RADIATION/ONCOLOGY SERIES
Discharge: HOME | End: 2018-09-26

## 2018-09-26 VITALS
RESPIRATION RATE: 18 BRPM | DIASTOLIC BLOOD PRESSURE: 94 MMHG | SYSTOLIC BLOOD PRESSURE: 164 MMHG | HEART RATE: 97 BPM | BODY MASS INDEX: 27.37 KG/M2 | TEMPERATURE: 97 F | WEIGHT: 169.6 LBS | OXYGEN SATURATION: 96 %

## 2018-09-26 DIAGNOSIS — Z17.0 MALIGNANT NEOPLASM OF LEFT BREAST IN FEMALE, ESTROGEN RECEPTOR POSITIVE, UNSPECIFIED SITE OF BREAST (HCC): ICD-10-CM

## 2018-09-26 DIAGNOSIS — C50.912 MALIGNANT NEOPLASM OF LEFT BREAST IN FEMALE, ESTROGEN RECEPTOR POSITIVE, UNSPECIFIED SITE OF BREAST (HCC): ICD-10-CM

## 2018-09-26 PROCEDURE — 77290 THER RAD SIMULAJ FIELD CPLX: CPT | Performed by: RADIOLOGY

## 2018-09-26 PROCEDURE — 77332 RADIATION TREATMENT AID(S): CPT | Performed by: RADIOLOGY

## 2018-09-26 PROCEDURE — G0463 HOSPITAL OUTPT CLINIC VISIT: HCPCS | Performed by: RADIOLOGY

## 2018-09-26 RX ORDER — RABEPRAZOLE SODIUM 20 MG/1
20 TABLET, DELAYED RELEASE ORAL DAILY
Qty: 30 TABLET | Refills: 2 | Status: SHIPPED | OUTPATIENT
Start: 2018-09-26 | End: 2018-09-28

## 2018-09-26 NOTE — PROGRESS NOTES
RE-EVALUATION    PATIENT:                                                      Tia Sommers  :                                                          1970  DATE:                          2018   DIAGNOSIS:     Malignant neoplasm of left breast in female, estrogen receptor positive, unspecified site of breast (CMS/HCC)       BRIEF HISTORY:  The patient is a very pleasant 48 y.o. female  with a history of a right sided breast cancer treated in  with chemotherapy and radiation, who was found earlier this year to have a left-sided T2 N0 M0 hormone receptor positive and HER-2 negative invasive lobular carcinoma.  She had 2 separate masses in the left breast, the largest of which measuring 4 cm and the second measuring 0.6 cm.  There was concern for positive inferior superficial margin.  She was node negative.  She is considered high risk according to the Breast Next Gene panel and went on to receive adjuvant chemotherapy with Taxotere and Cytoxan.  She just recently completed this 3 weeks ago and she presents today for reevaluation as she is ready to undergo radiation therapy.  From symptomatic standpoint, she states she is doing well.  Her main complaint today is related to fatigue.  She states the lymphedema in her right upper extremity is improved, and her left arm has been stable.  She denies any new or other complaints.    Allergies   Allergen Reactions   • Iodine Anaphylaxis   • Penicillins Anaphylaxis   • Latex Rash       Review of Systems   Constitutional: Positive for fatigue.   Cardiovascular:        Right arm lymphadema   Endocrine: Positive for hot flashes.   Musculoskeletal:        Reports intermittent back and neck spasms   Neurological:        Left brace to wrist   Hematological: Bruises/bleeds easily.   All other systems reviewed and are negative.          Objective   VITAL SIGNS:   Vitals:    18 1125   BP: 164/94  Comment: left ankle   Pulse: 97   Resp: 18   Temp: 97 °F (36.1  °C)   TempSrc: Temporal Artery    SpO2: 96%   Weight: 76.9 kg (169 lb 9.6 oz)   PainSc: 0-No pain        KPS 80%    Physical Exam   Constitutional: She is oriented to person, place, and time. She appears well-developed and well-nourished. No distress.   HENT:   Head: Normocephalic and atraumatic.   Mouth/Throat: Oropharynx is clear and moist.   Eyes: Pupils are equal, round, and reactive to light. Conjunctivae and EOM are normal.   Neck: Normal range of motion. Neck supple.   Cardiovascular: Normal rate and regular rhythm.  Exam reveals no friction rub.    No murmur heard.  Pulmonary/Chest: Effort normal and breath sounds normal. She has no wheezes.   She is status post bilateral mastectomy.  There are no palpable abnormalities or lymphadenopathy bilaterally.   Abdominal: Soft. Bowel sounds are normal. She exhibits no distension and no mass. There is no tenderness.   Musculoskeletal: Normal range of motion. She exhibits edema.   2+ RUE edema, 1+ LUE edema; both improved compared to previous visit in June 2018   Lymphadenopathy:     She has no cervical adenopathy.   Neurological: She is alert and oriented to person, place, and time.   Skin: Skin is warm and dry.   Psychiatric: She has a normal mood and affect. Her behavior is normal. Judgment and thought content normal.   Nursing note and vitals reviewed.           The following portions of the patient's history were reviewed and updated as appropriate: allergies, current medications, past family history, past medical history, past social history, past surgical history and problem list.    Diagnoses and all orders for this visit:    Malignant neoplasm of left breast in female, estrogen receptor positive, unspecified site of breast (CMS/HCC)      IMPRESSION:  Mrs. Sommers is a 48-year-old female with a history of a right sided breast cancer treated in 1998 by mastectomy followed by adjuvant chemotherapy and radiation, who now has been diagnosed with a left sided  hormone receptor positive T2 N0 invasive lobular carcinoma.  She is artery completed chemotherapy and will require adjuvant chest wall radiation consisting of 45 gray in  25 fractions of 1.8 Gy each.  This will overlap likely with her previous right chest wall radiation treatments, and given her young age, I will use deep inspiratory breath hold to limit the radiation dose to her heart.  This will be followed by a scar boost which will be an additional 8 fractions ringing the total dose to 61 Chávez.    RECOMMENDATIONS:   Return to clinic in approximately 10 days to begin left chest wall radiation.       Chuck Fowler MD

## 2018-10-01 ENCOUNTER — HOSPITAL ENCOUNTER (OUTPATIENT)
Dept: PHYSICAL THERAPY | Facility: HOSPITAL | Age: 48
Setting detail: THERAPIES SERIES
Discharge: HOME | End: 2018-10-01
Attending: SURGERY

## 2018-10-01 ENCOUNTER — TELEPHONE (OUTPATIENT)
Dept: GASTROENTEROLOGY | Facility: CLINIC | Age: 48
End: 2018-10-01

## 2018-10-01 DIAGNOSIS — I89.0 LYMPHEDEMA OF RIGHT UPPER EXTREMITY: ICD-10-CM

## 2018-10-01 DIAGNOSIS — L90.5 SCAR CONDITIONS AND FIBROSIS OF SKIN: ICD-10-CM

## 2018-10-01 DIAGNOSIS — I97.2 POST-MASTECTOMY LYMPHEDEMA SYNDROME: Primary | ICD-10-CM

## 2018-10-01 DIAGNOSIS — N64.4 MASTODYNIA OF LEFT BREAST: ICD-10-CM

## 2018-10-01 DIAGNOSIS — I89.0 LYMPHEDEMA OF LEFT UPPER EXTREMITY: ICD-10-CM

## 2018-10-01 PROCEDURE — 97140 MANUAL THERAPY 1/> REGIONS: CPT | Performed by: PHYSICAL THERAPIST

## 2018-10-01 NOTE — THERAPY TREATMENT NOTE
"    Outpatient Physical Therapy Lymphedema Treatment Note  [facility]     Patient Name: Tia Sommers  : 1970  MRN: [id number]  Today's Date: 10/1/2018        Visit Date: 10/01/2018    Visit Dx:    ICD-10-CM ICD-9-CM   1. 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 Lymphedema of right upper extremity I89.0 457.1   3. Scar conditions and fibrosis of skin L90.5 709.2   4. Mastodynia of left breast N64.4 611.71   5. Lymphedema of left upper extremity I89.0 457.1       Patient Active Problem List   Diagnosis   • Malignant neoplasm of upper-inner quadrant of left breast in female, estrogen receptor positive (CMS/HCC)   • Epigastric pain   • Elevated LFTs   • Abnormal CT of the chest   • Malignant neoplasm of left breast in female, estrogen receptor positive (CMS/HCC)   • History of ITP   • Lupus              Lymphedema     Row Name 10/01/18 1100          Able to rate subjective pain? yes  -MW    Pre-Treatment Pain Level 2  -MW    Post-Treatment Pain Level 1  -MW    Subjective Pain Comment tender in pockets   -MW          Subjective Comments Reports feeling pretty good; didn't get to use pump much while OOT, so Rt arm is still more swollen but better than end of last week. Reports starting XRT soon; maybe next week.   -MW          Ptting Edema Category By severity  -MW    Pitting Edema Mild;Moderate  -MW    Edema Assessment Comment RUE moderate; LUE mild to mod in upper arm and mild at wrist; hands trace   -MW          Location/Assessment Upper Extremity;Upper Quadrant  -MW    Upper Extremity Conditions bilateral:;intact;clean;left:;other (comment)   LT multiple tatoos   -MW    Upper Extremity Color/Pigment bilateral:;normal  -MW    Upper Quadrant Conditions bilateral:;intact  -MW    Upper Quadrant Color/Pigment left:;normal;right:;hyperpigmented   area of XRT \"tan\"   -MW          Quick Girth Areas Upper extremities  -MW    Volumetric Areas --  -MW          Axilla 32.5 cm  -MW    Mid upper arm 32 cm  " -MW    Elbow 26.6 cm  -MW    Mid forearm 21.8 cm  -MW    Wrist crease 16 cm  -MW    LUE Quick Girth Total 128.9  -MW          Manual Lymphatic Drainage initial sequence;opened regional lymph nodes;opened anastamoses;extremity treatment;astym  -MW    Initial Sequence supraclavicular;shoulder collectors  -MW    Supraclavicular right;left  -MW    Shoulder Collectors right;left  -MW    Abdomen --   deferred due to fullness/ bloating   -MW    Opened Regional Lymph Nodes inguinal  -MW    Inguinal right;left  -MW    Opened Anastamoses axillo-inguinal  -MW    Axillo-Inguinal right;left  -MW    Extremity Treatment MLD to full limb  -MW    MLD to Proximal Limb Only LUE  -MW    MLD to Full Limb RUE   -MW    Astym mastectomy protocol  -    Mastectomy Protocol supine;sitting  -    Mastectomy Protocol Comment Seated on stool with upper body resting on face craddle to focus on Rt & Lt upper back/ scapular area with evaluator and localizer; extra strokes to leveator scap, mid traps and teres mm groups. Increased tightness Rt and Lt UT/ leveator scap area. Continued strokes into lateral trunk adjacent to breast area. In supine: Evaluator and localizer to Rt and Lt chest, working around port on Lt and gently over XRT stickers. Mild to moderate soft tissue disruptions Rt chest, very mild Lt chest. Continued ASTYM into lateral trunk / ribs; mild soft tissue disruptions on Rt, Lt smooth. Repositioned in shoulder flex/ ABD to open axilla (partially able to achieve position bilat) and work on pectoralis tightness, soft tissue restrictions with extra strokes with isolator to bilat chest.   -MW    Manual Lymphatic Drainage Comments MLD mixed with STM post ASTYM working around trunk with position changes.   -MW          Compression/Skin Care skin care;wrapping location;bandaging;compression garment  -MW    Skin Care moisturizing lotion applied   residual cocoa butter on trunk   -MW    Wrapping Location upper extremity  -MW    Wrapping  Location UE right:;hand to axilla  -    Compression Garment Comments Assisted pt to don RUE arm sleeve  -    Compression/Skin Care Comments measured LUE for compression sleeve as pt with mild proximal edema and preparing for 25 +8 XRT sessions to LT upper quarter starting next week.   -      User Key  (r) = Recorded By, (t) = Taken By, (c) = Cosigned By    Initials Name Provider Type     Jennifer Noriega, PT Physical Therapist                              PT Assessment/Plan     Row Name 10/01/18 1100          PT Assessment    Functional Limitations Performance in self-care ADL;Limitation in home management;Performance in work activities  -     Impairments Edema;Impaired lymphatic circulation;Impaired postural alignment;Muscle strength;Pain;Range of motion;Sensation  -     Assessment Comments RUE lymphedema visibly increased but soft and skin in good condition; expect RUE to return to baseline as pt continues to resume daily lymph pump use. LUE continues to demonstrate proximal edema and mild edema at wrist; will begin process to obtain LUE compression sleeve to assist with lymphedema management especially in light of 35 Gy XRT planned to begin next week. Overall shoulder ROM/ Flexibility has remained stable. Cont 1 x wk to cont to promote lymphedema control, and maximize ROM/ flexibility for function.   -MW     Rehab Potential Good  -MW     Patient/caregiver participated in establishment of treatment plan and goals Yes  -MW     Patient would benefit from skilled therapy intervention Yes  -MW        PT Plan    PT Frequency 1x/week  -     Physical Therapy Interventions (Optional Details) manual therapy techniques;manual lymphatic drainage;postural re-education;ROM (Range of Motion);stretching;strengthening;home exercise program  -     PT Plan Comments Cont ASTYM/STM, MLD; progress Ther exer marina LT shoulder ROM/ flexibility. Follow up with [place] [facility] for LUE sleeve prn.   -       User Key  (r) =  Recorded By, (t) = Taken By, (c) = Cosigned By    Initials Name Provider Type    Jennifer Solorio, PT Physical Therapist                     Exercises     Row Name 10/01/18 1100             Subjective Comments    Subjective Comments Reports feeling pretty good; didn't get to use pump much while OOT, so Rt arm is still more swollen but better than end of last week. Reports starting XRT soon; maybe next week.   -MW         Subjective Pain    Able to rate subjective pain? yes  -MW      Pre-Treatment Pain Level 2  -MW      Post-Treatment Pain Level 1  -MW      Subjective Pain Comment tender in pockets   -MW         Total Minutes    47953 - PT Manual Therapy Minutes 60  -MW         Exercise 1    Exercise Name 1 RT HBH wings   -MW      Reps 1 9  -MW      Additional Comments concurrent with Manual therapy  -MW         Exercise 2    Exercise Name 2 LT HBH wings   -MW      Reps 2 9  -MW      Additional Comments concurrent with Manual therapy  -MW         Exercise 3    Exercise Name 3 HBH / chicken wings with seated trunk rotation  -MW      Cueing 3 Demo;Verbal  -MW      Reps 3 3  -MW      Additional Comments Deep breath at end   -MW         Exercise 4    Exercise Name 4 HBH / chicken wings with seated trunk sidebending (elbow to ceiling)   -MW      Cueing 4 Demo;Verbal  -MW      Reps 4 2  -MW      Additional Comments as tolerated; no shoulder pain   -MW        User Key  (r) = Recorded By, (t) = Taken By, (c) = Cosigned By    Initials Name Provider Type    Jennifer Solorio, PT Physical Therapist                       Manual Rx (last 36 hours)      Manual Treatments     Row Name 10/01/18 1100             Total Minutes    83476 - PT Manual Therapy Minutes 60  -MW         Manual Rx 1    Manual Rx 1 Location Bilat chest / mastectomy sites   -MW      Manual Rx 1 Type STM with tissue bending, lifting, rolling and space correction techniques at lateral pectoralis border and at incision lines   -MW      Manual Rx 1 Grade  gentle to moderate  -MW      Manual Rx 1 Duration 20  -MW         Manual Rx 2    Manual Rx 2 Location LT < RT upper trap, mid trap, leveator scap and teres mm groups wrapping around into posterior axilla   -MW      Manual Rx 2 Type STM with tissue bending and lifting techniques   -MW      Manual Rx 2 Grade gentle to moderate   -MW      Manual Rx 2 Duration 10  -MW        User Key  (r) = Recorded By, (t) = Taken By, (c) = Cosigned By    Initials Name Provider Type    Jennifer Soloroi, PT Physical Therapist              Therapy Education  Education Details: Plan for LUE sleeve to keep arm in check especially with mild lymphedema now and XRT beginning next week. Added more chest and shoulder opening exer (chicken wings rotation and SBing)   Given: HEP, Edema management  Program: Progressed  How Provided: Verbal, Demonstration  Provided to: Patient  Level of Understanding: Verbalized, Demonstrated, Teach back education performed              Time Calculation:   Start Time: 1110  Total Timed Code Minutes- PT: 60 minute(s)   Therapy Suggested Charges     Code   Minutes Charges    93111 (CPT®) Hc Pt Neuromusc Re Education Ea 15 Min      31053 (CPT®) Hc Pt Ther Proc Ea 15 Min      77689 (CPT®) Hc Gait Training Ea 15 Min      65042 (CPT®) Hc Pt Therapeutic Act Ea 15 Min      77797 (CPT®) Hc Pt Manual Therapy Ea 15 Min 60 4    48292 (CPT®) Hc Pt Ther Massage- Per 15 Min      07626 (CPT®) Hc Pt Iontophoresis Ea 15 Min      43919 (CPT®) Hc Pt Elec Stim Ea-Per 15 Min      17991 (CPT®) Hc Pt Ultrasound Ea 15 Min      39868 (CPT®) Hc Pt Self Care/Mgmt/Train Ea 15 Min      41522 (CPT®) Hc Pt Prosthetic (S) Train Initial Encounter, Each 15 Min      84279 (CPT®) Hc Orthotic(S) Mgmt/Train Initial Encounter, Each 15min      28607 (CPT®) Hc Pt Aquatic Therapy Ea 15 Min      96136 (CPT®) Hc Pt Orthotic(S)/Prosthetic(S) Encounter, Each 15 Min      Total  60 4        Therapy Charges for Today     Code Description Service Date Service  Provider Modifiers Qty    57372842407 HC PT MANUAL THERAPY EA 15 MIN 10/1/2018 Jennifer Noriega, PT GP 4                    Jennifer Noriega, PT  10/1/2018

## 2018-10-01 NOTE — TELEPHONE ENCOUNTER
----- Message from Andrea Osuna MD sent at 9/17/2018  9:39 PM EDT -----  Domitila:   There was 1 adenoma type polyp. She should have a repeat exam in 5 years. There was some changes of acid reflux on the biopsies.         Domitila Young, APRN          12:48 PM   Note      Sylvain--I tried to call, but no answer.  Left VM  · Pls let pt know that EGD pathology shows chronic esophagitis/ gastritis (inflammation of esophagus/stomach).  No other abnormality.   · I'm sending in rx for Protonix  · Colonoscopy showed benign polyp.    · Repeat colonoscopy in 5 yrs.  · Keep f/u w/ me in 11/2018

## 2018-10-03 ENCOUNTER — HOSPITAL ENCOUNTER (OUTPATIENT)
Dept: RADIATION ONCOLOGY | Facility: HOSPITAL | Age: 48
Setting detail: RADIATION/ONCOLOGY SERIES
Discharge: HOME | End: 2018-10-03

## 2018-10-03 ENCOUNTER — TELEPHONE (OUTPATIENT)
Dept: SOCIAL WORK | Facility: HOSPITAL | Age: 48
End: 2018-10-03

## 2018-10-03 PROCEDURE — 77300 RADIATION THERAPY DOSE PLAN: CPT | Performed by: RADIOLOGY

## 2018-10-03 PROCEDURE — 77295 3-D RADIOTHERAPY PLAN: CPT | Performed by: RADIOLOGY

## 2018-10-03 PROCEDURE — 77334 RADIATION TREATMENT AID(S): CPT | Performed by: RADIOLOGY

## 2018-10-03 NOTE — TELEPHONE ENCOUNTER
SW received a phone call from pt regarding financial concerns.  Pt states that she has been terminated by her employer as she only had short-term disability benefits.  She states that she has no income and has lost her insurance as a result.  SW referred pt to Healthcare[employer] to apply for insurance coverage.  SW explained the Social Security disability application process.  Pt has already applied and is awaiting her decision.  SW referred pt to [facility] for a one-time financial jake, [place] [facility] [employer], [email or web address] for help with her utilities and the Medicaid office to apply for food stamps.  SW available for ongoing support and resource needs.

## 2018-10-08 ENCOUNTER — HOSPITAL ENCOUNTER (OUTPATIENT)
Dept: PHYSICAL THERAPY | Facility: HOSPITAL | Age: 48
Setting detail: THERAPIES SERIES
Discharge: HOME | End: 2018-10-08
Attending: SURGERY

## 2018-10-08 DIAGNOSIS — R68.89 IMPAIRED FUNCTION OF UPPER EXTREMITY: ICD-10-CM

## 2018-10-08 DIAGNOSIS — I97.2 POST-MASTECTOMY LYMPHEDEMA SYNDROME: Primary | ICD-10-CM

## 2018-10-08 DIAGNOSIS — N64.4 MASTODYNIA OF LEFT BREAST: ICD-10-CM

## 2018-10-08 DIAGNOSIS — I89.0 LYMPHEDEMA OF RIGHT UPPER EXTREMITY: ICD-10-CM

## 2018-10-08 DIAGNOSIS — I89.0 LYMPHEDEMA OF LEFT UPPER EXTREMITY: ICD-10-CM

## 2018-10-08 DIAGNOSIS — L90.5 SCAR CONDITIONS AND FIBROSIS OF SKIN: ICD-10-CM

## 2018-10-08 PROCEDURE — 97140 MANUAL THERAPY 1/> REGIONS: CPT | Performed by: PHYSICAL THERAPIST

## 2018-10-08 NOTE — THERAPY TREATMENT NOTE
Outpatient Physical Therapy Lymphedema Treatment Note  [facility]     Patient Name: Tia Sommers  : 1970  MRN: [id number]  Today's Date: 10/8/2018        Visit Date: 10/08/2018    Visit Dx:    ICD-10-CM ICD-9-CM   1. 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 Lymphedema of right upper extremity I89.0 457.1   3. Scar conditions and fibrosis of skin L90.5 709.2   4. Mastodynia of left breast N64.4 611.71   5. Lymphedema of left upper extremity I89.0 457.1   6. Impaired function of upper extremity R68.89 V49.5       Patient Active Problem List   Diagnosis   • Malignant neoplasm of upper-inner quadrant of left breast in female, estrogen receptor positive (CMS/HCC)   • Epigastric pain   • Elevated LFTs   • Abnormal CT of the chest   • Malignant neoplasm of left breast in female, estrogen receptor positive (CMS/HCC)   • History of ITP   • Lupus              Lymphedema     Row Name 10/08/18 1300          Able to rate subjective pain? yes  -MW    Pre-Treatment Pain Level 9  -MW    Post-Treatment Pain Level 8  -MW    Subjective Pain Comment more sore all over; knees, hips, calves. Left chest is more tender too.   -MW          Subjective Comments Has been using pump but feeling more sore all over; Lt knee swollen, hips to feet sore and achy, Not sure if it could be Lupus flair up as she hasn't really been treated or followed for her Lupus. Lots of stress with lossing FLMA and insurance at the beginning of Oct.   -MW          Ptting Edema Category By severity  -MW    Pitting Edema Moderate  -MW    Edema Assessment Comment RUE moderate; LUE mod in upper arm and at wrist; bilateral trunk also more full - moderate Rt > Lt.   -MW          Location/Assessment Upper Extremity;Upper Quadrant  -MW    Upper Extremity Conditions bilateral:;intact;clean;left:;other (comment)   LT multiple tatoos   -MW    Upper Extremity Color/Pigment bilateral:;normal  -MW    Upper Quadrant Conditions bilateral:;intact  -MW     "Upper Quadrant Color/Pigment left:;normal;right:;hyperpigmented   area of XRT \"tan\"   -MW          Lymphedema Sensation Comments More tender across Lt chest and very tender along lateral aspect / area of tightness   -MW          Quick Girth Areas Upper extremities  -MW          Manual Lymphatic Drainage initial sequence;opened regional lymph nodes;opened anastamoses;extremity treatment;astym  -    Initial Sequence supraclavicular;shoulder collectors  -MW    Supraclavicular right;left  -MW    Shoulder Collectors right;left  -MW    Abdomen --   deferred due to fullness/ bloating   -MW    Opened Regional Lymph Nodes inguinal  -MW    Inguinal right;left  -MW    Opened Anastamoses axillo-inguinal  -MW    Axillo-Inguinal right;left  -MW    Extremity Treatment MLD to full limb  -MW    Simple/Brief MLD LUE full limb, focused on upper arm and wrist   -MW    MLD to Full Limb RUE   -MW    Astym mastectomy protocol  -    Mastectomy Protocol supine;sitting  -    Mastectomy Protocol Comment Seated on stool with upper body resting on face craddle to focus on Rt & Lt upper back/ scapular area with evaluator and localizer; extra strokes to leveator scap, mid traps and teres mm groups. Continued strokes into lateral trunk adjacent to breast area. In supine: Evaluator and localizer to Rt and Lt chest, working around port on Lt and gently over XRT stickers. Mild to moderate soft tissue disruptions Rt chest, very mild Lt chest. Continued ASTYM into lateral trunk / ribs; mild soft tissue disruptions on Rt, Lt smooth. Repositioned in shoulder flex/ ABD to open axilla (partially able to achieve position bilat) and work on pectoralis tightness, soft tissue restrictions extra strokes bilat.   -    Manual Lymphatic Drainage Comments MLD mixed with STM post ASTYM working around trunk with position changes.   -MW    Manual Therapy Less pressure due to increased c/o tenderness Lt chest   -MW          Compression/Skin Care skin " care;wrapping location;bandaging;compression garment  -    Skin Care moisturizing lotion applied   residual cocoa butter on trunk   -    Wrapping Location upper extremity  -    Wrapping Location UE right:;hand to axilla  -      User Key  (r) = Recorded By, (t) = Taken By, (c) = Cosigned By    Initials Name Provider Type    MW Jennifer Noriega, PT Physical Therapist                              PT Assessment/Plan     Row Name 10/08/18 1300          Functional Limitations Performance in self-care ADL;Limitation in home management;Performance in work activities  -    Impairments Edema;Impaired lymphatic circulation;Impaired postural alignment;Muscle strength;Pain;Range of motion;Sensation  -    Assessment Comments Pt returns with c/o increased swelling and pain through out her body; especially her left knee but also more tender Lt chest; more fullness bilateral trunk, but visually UE stable. Still awaiting compression sleeve for LUE, and for schedule for radiation therapy sessions to begin. Pt reports increased stress in her life due to loss of [place] (income and health insurance); notified SW that pt has no food and requested meal voucher / food assistance. Skin in good condition but more tender on Lt chest. Follow up 1 x wk and progress as tolerated; adjust prn XRT.   -MW    Rehab Potential Fair  -MW    Patient/caregiver participated in establishment of treatment plan and goals Yes  -MW    Patient would benefit from skilled therapy intervention Yes  -MW          PT Frequency 1x/week  -    Physical Therapy Interventions (Optional Details) manual therapy techniques;manual lymphatic drainage;postural re-education;ROM (Range of Motion);stretching;strengthening;home exercise program  -    PT Plan Comments Cont ASTYM/STM, MLD; progress Ther exer [place] LT shoulder ROM/ flexibility. Follow up with [place] [facility] for LUE sleeve prn.   -      User Key  (r) = Recorded By, (t) = Taken By, (c) = Cosigned By     Initials Name Provider Type    Jennifer Solorio, PT Physical Therapist                     Exercises     Row Name 10/08/18 1300             Subjective Comments    Subjective Comments Has been using pump but feeling more sore all over; Lt knee swollen, hips to feet sore and achy, Not sure if it could be Lupus flair up as she hasn't really been treated or followed for her Lupus. Lots of stress with lossing FLMA and insurance at the beginning of Oct.   -MW         Subjective Pain    Able to rate subjective pain? yes  -MW      Pre-Treatment Pain Level 9  -MW      Post-Treatment Pain Level 8  -MW      Subjective Pain Comment more sore all over; knees, hips, calves. Left chest is more tender too.   -MW         Total Minutes    04531 - PT Manual Therapy Minutes 55  -MW         Exercise 1    Exercise Name 1 RT HB wings   -MW      Reps 1 6  -MW      Additional Comments concurrent with Manual therapy  -MW         Exercise 2    Exercise Name 2 LT HB wings   -MW      Reps 2 9  -MW      Additional Comments concurrent with Manual therapy  -MW        User Key  (r) = Recorded By, (t) = Taken By, (c) = Cosigned By    Initials Name Provider Type    Jennifer Solorio, PT Physical Therapist                       Manual Rx (last 36 hours)      Manual Treatments     Row Name 10/08/18 1300             Total Minutes    37286 - PT Manual Therapy Minutes 55  -MW         Manual Rx 1    Manual Rx 1 Location Bilat chest / mastectomy sites   -MW      Manual Rx 1 Type STM with tissue bending, lifting, rolling and space correction techniques at lateral pectoralis border and at incision lines   -MW      Manual Rx 1 Grade gentle to moderate  -MW      Manual Rx 1 Duration 20  -MW         Manual Rx 2    Manual Rx 2 Location LT < RT upper trap, mid trap, leveator scap and teres mm groups wrapping around into posterior axilla   -MW      Manual Rx 2 Type STM with tissue bending and lifting techniques   -MW      Manual Rx 2 Grade gentle to  moderate   -MW      Manual Rx 2 Duration 10  -MW        User Key  (r) = Recorded By, (t) = Taken By, (c) = Cosigned By    Initials Name Provider Type    Jennifer Solorio, PT Physical Therapist              Therapy Education  Education Details: Follow up with [place] [facility] link for LUE sleeve; ck with SW for assistance options   Given: Edema management, Symptoms/condition management  Program: Reinforced  How Provided: Verbal, Demonstration  Provided to: Patient  Level of Understanding: Verbalized              Time Calculation:   Start Time: 1300  Total Timed Code Minutes- PT: 55 minute(s)   Therapy Suggested Charges     Code   Minutes Charges    02966 (CPT®) Hc Pt Neuromusc Re Education Ea 15 Min      37652 (CPT®) Hc Pt Ther Proc Ea 15 Min      43521 (CPT®) Hc Gait Training Ea 15 Min      89668 (CPT®) Hc Pt Therapeutic Act Ea 15 Min      53318 (CPT®) Hc Pt Manual Therapy Ea 15 Min 55 4    03941 (CPT®) Hc Pt Ther Massage- Per 15 Min      39823 (CPT®) Hc Pt Iontophoresis Ea 15 Min      86953 (CPT®) Hc Pt Elec Stim Ea-Per 15 Min      32861 (CPT®) Hc Pt Ultrasound Ea 15 Min      83499 (CPT®) Hc Pt Self Care/Mgmt/Train Ea 15 Min      35017 (CPT®) Hc Pt Prosthetic (S) Train Initial Encounter, Each 15 Min      98342 (CPT®) Hc Orthotic(S) Mgmt/Train Initial Encounter, Each 15min      35321 (CPT®) Hc Pt Aquatic Therapy Ea 15 Min      43123 (CPT®) Hc Pt Orthotic(S)/Prosthetic(S) Encounter, Each 15 Min       (CPT®) Hc Pt Electrical Stim Unattended      Total  55 4        Therapy Charges for Today     Code Description Service Date Service Provider Modifiers Qty    52246902523 HC PT MANUAL THERAPY EA 15 MIN 10/8/2018 Jennifer Noriega, PT GP 4                    Jennifer Noriega, PT  10/8/2018

## 2018-10-09 ENCOUNTER — DOCUMENTATION (OUTPATIENT)
Dept: SOCIAL WORK | Facility: HOSPITAL | Age: 48
End: 2018-10-09

## 2018-10-09 NOTE — PROGRESS NOTES
SW met with pt on 10/8/18 to provide support and resources.  Pt is in the process of applying for the various sources of support that we discussed last week.  Pt has been approved for emergency Medicaid, which should be effective immediately.  SW provided [employer] cards to pt to help with grocery need while she is in the waiting period.  SW available for ongoing support and resource needs.

## 2018-10-12 ENCOUNTER — HOSPITAL ENCOUNTER (OUTPATIENT)
Dept: RADIATION ONCOLOGY | Facility: HOSPITAL | Age: 48
Discharge: HOME | End: 2018-10-12

## 2018-10-12 PROCEDURE — 77280 THER RAD SIMULAJ FIELD SMPL: CPT | Performed by: RADIOLOGY

## 2018-10-15 ENCOUNTER — INFUSION (OUTPATIENT)
Dept: ONCOLOGY | Facility: HOSPITAL | Age: 48
End: 2018-10-15

## 2018-10-15 VITALS
SYSTOLIC BLOOD PRESSURE: 164 MMHG | DIASTOLIC BLOOD PRESSURE: 108 MMHG | HEART RATE: 106 BPM | BODY MASS INDEX: 27.32 KG/M2 | WEIGHT: 170 LBS | RESPIRATION RATE: 18 BRPM | TEMPERATURE: 98 F | HEIGHT: 66 IN

## 2018-10-15 DIAGNOSIS — C50.212 MALIGNANT NEOPLASM OF UPPER-INNER QUADRANT OF LEFT BREAST IN FEMALE, ESTROGEN RECEPTOR POSITIVE (HCC): Primary | ICD-10-CM

## 2018-10-15 DIAGNOSIS — Z17.0 MALIGNANT NEOPLASM OF UPPER-INNER QUADRANT OF LEFT BREAST IN FEMALE, ESTROGEN RECEPTOR POSITIVE (HCC): Primary | ICD-10-CM

## 2018-10-15 PROCEDURE — 25010000002 LEUPROLIDE ACETATE (3 MONTH) PER 3.75 MG: Performed by: NURSE PRACTITIONER

## 2018-10-15 PROCEDURE — 96402 CHEMO HORMON ANTINEOPL SQ/IM: CPT

## 2018-10-15 RX ORDER — SODIUM CHLORIDE 0.9 % (FLUSH) 0.9 %
10 SYRINGE (ML) INJECTION AS NEEDED
Status: DISCONTINUED | OUTPATIENT
Start: 2018-10-15 | End: 2018-10-15 | Stop reason: HOSPADM

## 2018-10-15 RX ORDER — SODIUM CHLORIDE 0.9 % (FLUSH) 0.9 %
10 SYRINGE (ML) INJECTION AS NEEDED
Status: CANCELLED | OUTPATIENT
Start: 2018-10-15

## 2018-10-15 RX ADMIN — LEUPROLIDE ACETATE 11.25 MG: KIT at 14:30

## 2018-10-15 RX ADMIN — Medication 10 ML: at 14:16

## 2018-10-15 RX ADMIN — HEPARIN 500 UNITS: 100 SYRINGE at 14:16

## 2018-10-16 ENCOUNTER — HOSPITAL ENCOUNTER (OUTPATIENT)
Dept: RADIATION ONCOLOGY | Facility: HOSPITAL | Age: 48
Discharge: HOME | End: 2018-10-16

## 2018-10-16 ENCOUNTER — HOSPITAL ENCOUNTER (OUTPATIENT)
Dept: PHYSICAL THERAPY | Facility: HOSPITAL | Age: 48
Setting detail: THERAPIES SERIES
Discharge: HOME | End: 2018-10-16
Attending: SURGERY

## 2018-10-16 VITALS — BODY MASS INDEX: 27.44 KG/M2 | WEIGHT: 169.9 LBS

## 2018-10-16 DIAGNOSIS — I89.0 LYMPHEDEMA OF RIGHT UPPER EXTREMITY: ICD-10-CM

## 2018-10-16 DIAGNOSIS — I89.0 LYMPHEDEMA OF LEFT UPPER EXTREMITY: ICD-10-CM

## 2018-10-16 DIAGNOSIS — I97.2 POST-MASTECTOMY LYMPHEDEMA SYNDROME: Primary | ICD-10-CM

## 2018-10-16 DIAGNOSIS — L90.5 SCAR CONDITIONS AND FIBROSIS OF SKIN: ICD-10-CM

## 2018-10-16 DIAGNOSIS — N64.4 MASTODYNIA OF LEFT BREAST: ICD-10-CM

## 2018-10-16 PROCEDURE — 77387 GUIDANCE FOR RADJ TX DLVR: CPT | Performed by: RADIOLOGY

## 2018-10-16 PROCEDURE — 97140 MANUAL THERAPY 1/> REGIONS: CPT | Performed by: PHYSICAL THERAPIST

## 2018-10-16 PROCEDURE — 77412 RADIATION TX DELIVERY LVL 3: CPT | Performed by: RADIOLOGY

## 2018-10-16 NOTE — THERAPY TREATMENT NOTE
"    Outpatient Physical Therapy Lymphedema Treatment Note  [facility]     Patient Name: Tia Sommers  : 1970  MRN: [id number]  Today's Date: 10/16/2018        Visit Date: 10/16/2018    Visit Dx:    ICD-10-CM ICD-9-CM   1. 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 Lymphedema of right upper extremity I89.0 457.1   3. Scar conditions and fibrosis of skin L90.5 709.2   4. Mastodynia of left breast N64.4 611.71   5. Lymphedema of left upper extremity I89.0 457.1       Patient Active Problem List   Diagnosis   • Malignant neoplasm of upper-inner quadrant of left breast in female, estrogen receptor positive (CMS/HCC)   • Epigastric pain   • Elevated LFTs   • Abnormal CT of the chest   • Malignant neoplasm of left breast in female, estrogen receptor positive (CMS/HCC)   • History of ITP   • Lupus              Lymphedema     Row Name 10/16/18 1300          Able to rate subjective pain? yes  -MW    Pre-Treatment Pain Level 4  -MW    Post-Treatment Pain Level 3  -MW    Subjective Pain Comment Tight on Rt and Lt; tender left back of arm pit   -MW          Subjective Comments Started XRT yesterday   -MW          Ptting Edema Category By severity  -MW    Pitting Edema Moderate  -MW    Edema Assessment Comment Mod RUE, bilat lateral trunk areas, LUE mild   -MW          Location/Assessment Upper Extremity;Upper Quadrant  -MW    Upper Extremity Conditions bilateral:;intact;clean;left:;other (comment)   LT multiple tatoos  -MW    Upper Extremity Color/Pigment bilateral:;normal  -MW    Upper Quadrant Conditions bilateral:;intact  -MW    Upper Quadrant Color/Pigment right:;hyperpigmented;left:;other (comment)   area of XRT \"tan\"; Lt XRT stickers and markings   -MW          Quick Girth Areas --  -MW          Manual Lymphatic Drainage initial sequence;opened regional lymph nodes;opened anastamoses;extremity treatment;astym  -MW    Initial Sequence supraclavicular;shoulder collectors  -MW    Supraclavicular " right;left  -MW    Shoulder Collectors right;left  -MW    Abdomen --   deferred due to fullness/ bloating   -MW    Opened Regional Lymph Nodes inguinal  -MW    Inguinal right;left  -MW    Opened Anastamoses axillo-inguinal  -MW    Axillo-Inguinal right;left  -MW    Extremity Treatment MLD to full limb  -MW    Simple/Brief MLD RUE and LUE with focus on LT proximal arm   -MW    Astym mastectomy protocol  -MW    Mastectomy Protocol supine;sitting  -MW    Mastectomy Protocol Comment Seated on stool with upper body resting on face craddle to focus on Rt & Lt upper back/ scapular area with evaluator and localizer; extra strokes to leveator scap, mid traps and teres mm groups. Continued strokes into lateral trunk adjacent to breast area. In supine: Evaluator and localizer to Rt and Lt chest, working around port on Lt and gently over XRT stickers. Mild to moderate soft tissue disruptions Rt chest, mild Lt chest. Continued ASTYM into lateral trunk / ribs; mild soft tissue disruptions on Rt, Lt smooth. Repositioned in shoulder flex/ ABD to open axilla (partially able to achieve position bilat) and work on pectoralis tightness, soft tissue restrictions extra strokes bilat.   -MW    Manual Lymphatic Drainage Comments MLD mixed with STM post ASTYM working around trunk with position changes.   -MW          Compression/Skin Care skin care;wrapping location;bandaging;compression garment  -    Skin Care moisturizing lotion applied   residual cocoa butter on trunk   -MW    Wrapping Location upper extremity  -MW    Wrapping Location UE right:;hand to axilla  -MW    Compression Garment Comments Assisted pt to don RUE arm sleeve; encouraged pt to follow up with [place] [facility] for LUE sleeve   -      User Key  (r) = Recorded By, (t) = Taken By, (c) = Cosigned By    Initials Name Provider Type    MW Jennifer Noriega, PT Physical Therapist                              PT Assessment/Plan     Row Name 10/16/18 1300          PT  Assessment    Functional Limitations Performance in self-care ADL;Limitation in home management;Performance in work activities  -MW     Impairments Edema;Impaired lymphatic circulation;Impaired postural alignment;Muscle strength;Pain;Range of motion;Sensation  -MW     Assessment Comments Moderate lateral trunk fullness bilaterally but less tender with treatment. Skin in good condition; will monitor for skin changes and switch to Aquaphor as needed with XRT. Encouraged pt to continue with HEP / stretches to not loose her good progress with her shoulder flexibility. Pt to follow up for LUE arm sleeve to manage early lymphedema and minimize risk of increased edema as XRT progresses.   -MW     Rehab Potential Fair  -MW     Patient/caregiver participated in establishment of treatment plan and goals Yes  -MW     Patient would benefit from skilled therapy intervention Yes  -MW        PT Plan    PT Frequency 1x/week  -MW     Physical Therapy Interventions (Optional Details) manual therapy techniques;manual lymphatic drainage;postural re-education;ROM (Range of Motion);stretching;strengthening;home exercise program  -     PT Plan Comments Cont ASTYM/STM, MLD; progress Ther exer marina LT shoulder ROM/ flexibility. Follow up with [place] [facility] for LUE sleeve prn.   -MW       User Key  (r) = Recorded By, (t) = Taken By, (c) = Cosigned By    Initials Name Provider Type    Jennifer Solorio, PT Physical Therapist                     Exercises     Row Name 10/16/18 1300             Subjective Comments    Subjective Comments Started XRT yesterday   -MW         Subjective Pain    Able to rate subjective pain? yes  -MW      Pre-Treatment Pain Level 4  -MW      Post-Treatment Pain Level 3  -MW      Subjective Pain Comment Tight on Rt and Lt; tender left back of arm pit   -MW         Total Minutes    89838 - PT Manual Therapy Minutes 55  -MW         Exercise 1    Exercise Name 1 RT HBH wings   -MW      Reps 1 6  -MW      Additional  Comments pt completed post tx   -MW         Exercise 2    Exercise Name 2 LT HBH wings   -MW      Reps 2 9  -MW      Additional Comments concurrent with Manual therapy  -MW        User Key  (r) = Recorded By, (t) = Taken By, (c) = Cosigned By    Initials Name Provider Type    Jennifer Solorio, PT Physical Therapist                       Manual Rx (last 36 hours)      Manual Treatments     Row Name 10/16/18 1300             Total Minutes    48760 - PT Manual Therapy Minutes 55  -MW         Manual Rx 1    Manual Rx 1 Location Bilat chest / mastectomy sites   -MW      Manual Rx 1 Type STM with tissue bending, lifting, rolling and space correction techniques at lateral pectoralis border and at incision lines   -MW      Manual Rx 1 Grade moderate  -MW      Manual Rx 1 Duration 20  -MW         Manual Rx 2    Manual Rx 2 Location LT < RT upper trap, mid trap, leveator scap and teres mm groups wrapping around into posterior axilla   -MW      Manual Rx 2 Type STM with tissue bending and lifting techniques   -MW      Manual Rx 2 Grade gentle to moderate   -MW      Manual Rx 2 Duration 10  -MW        User Key  (r) = Recorded By, (t) = Taken By, (c) = Cosigned By    Initials Name Provider Type    Jennifer Solorio, PT Physical Therapist              Therapy Education  Education Details: Follow up on arm sleeve; continue HEP stretches / ROM   Given: Symptoms/condition management, HEP, Edema management  Program: Reinforced  How Provided: Verbal  Provided to: Patient  Level of Understanding: Verbalized              Time Calculation:   Start Time: 1300  Total Timed Code Minutes- PT: 55 minute(s)   Therapy Suggested Charges     Code   Minutes Charges    46257 (CPT®)  Pt Neuromusc Re Education Ea 15 Min      42001 (CPT®) Hc Pt Ther Proc Ea 15 Min      28805 (CPT®) Hc Gait Training Ea 15 Min      55812 (CPT®) Hc Pt Therapeutic Act Ea 15 Min      65502 (CPT®) Hc Pt Manual Therapy Ea 15 Min 55 4    58714 (CPT®) Hc Pt Ther  Massage- Per 15 Min      01600 (CPT®) Hc Pt Iontophoresis Ea 15 Min      45840 (CPT®) Hc Pt Elec Stim Ea-Per 15 Min      12441 (CPT®) Hc Pt Ultrasound Ea 15 Min      49834 (CPT®) Hc Pt Self Care/Mgmt/Train Ea 15 Min      62890 (CPT®) Hc Pt Prosthetic (S) Train Initial Encounter, Each 15 Min      19615 (CPT®) Hc Orthotic(S) Mgmt/Train Initial Encounter, Each 15min      53212 (CPT®) Hc Pt Aquatic Therapy Ea 15 Min      47070 (CPT®) Hc Pt Orthotic(S)/Prosthetic(S) Encounter, Each 15 Min       (CPT®) Hc Pt Electrical Stim Unattended      Total  55 4        Therapy Charges for Today     Code Description Service Date Service Provider Modifiers Qty    49600590511 HC PT MANUAL THERAPY EA 15 MIN 10/16/2018 Jennifer Noriega, PT GP 4                    Jennifer Noriega, PT  10/16/2018

## 2018-10-17 ENCOUNTER — HOSPITAL ENCOUNTER (OUTPATIENT)
Dept: RADIATION ONCOLOGY | Facility: HOSPITAL | Age: 48
Discharge: HOME | End: 2018-10-17

## 2018-10-17 PROCEDURE — 77412 RADIATION TX DELIVERY LVL 3: CPT | Performed by: RADIOLOGY

## 2018-10-17 PROCEDURE — 77387 GUIDANCE FOR RADJ TX DLVR: CPT | Performed by: RADIOLOGY

## 2018-10-18 ENCOUNTER — HOSPITAL ENCOUNTER (OUTPATIENT)
Dept: RADIATION ONCOLOGY | Facility: HOSPITAL | Age: 48
Discharge: HOME | End: 2018-10-18

## 2018-10-18 PROCEDURE — 77336 RADIATION PHYSICS CONSULT: CPT | Performed by: RADIOLOGY

## 2018-10-18 PROCEDURE — 77412 RADIATION TX DELIVERY LVL 3: CPT | Performed by: RADIOLOGY

## 2018-10-18 PROCEDURE — 77387 GUIDANCE FOR RADJ TX DLVR: CPT | Performed by: RADIOLOGY

## 2018-10-19 ENCOUNTER — HOSPITAL ENCOUNTER (OUTPATIENT)
Dept: RADIATION ONCOLOGY | Facility: HOSPITAL | Age: 48
Discharge: HOME | End: 2018-10-19

## 2018-10-19 PROCEDURE — 77387 GUIDANCE FOR RADJ TX DLVR: CPT | Performed by: RADIOLOGY

## 2018-10-19 PROCEDURE — 77412 RADIATION TX DELIVERY LVL 3: CPT | Performed by: RADIOLOGY

## 2018-10-22 ENCOUNTER — HOSPITAL ENCOUNTER (OUTPATIENT)
Dept: RADIATION ONCOLOGY | Facility: HOSPITAL | Age: 48
Discharge: HOME | End: 2018-10-22

## 2018-10-22 PROCEDURE — 77387 GUIDANCE FOR RADJ TX DLVR: CPT | Performed by: RADIOLOGY

## 2018-10-22 PROCEDURE — 77412 RADIATION TX DELIVERY LVL 3: CPT | Performed by: RADIOLOGY

## 2018-10-23 ENCOUNTER — HOSPITAL ENCOUNTER (OUTPATIENT)
Dept: RADIATION ONCOLOGY | Facility: HOSPITAL | Age: 48
Discharge: HOME | End: 2018-10-23

## 2018-10-23 ENCOUNTER — HOSPITAL ENCOUNTER (OUTPATIENT)
Dept: PHYSICAL THERAPY | Facility: HOSPITAL | Age: 48
Setting detail: THERAPIES SERIES
Discharge: HOME | End: 2018-10-23
Attending: SURGERY

## 2018-10-23 VITALS — BODY MASS INDEX: 27.1 KG/M2 | WEIGHT: 167.8 LBS

## 2018-10-23 DIAGNOSIS — R68.89 IMPAIRED FUNCTION OF UPPER EXTREMITY: ICD-10-CM

## 2018-10-23 DIAGNOSIS — I89.0 LYMPHEDEMA OF LEFT UPPER EXTREMITY: ICD-10-CM

## 2018-10-23 DIAGNOSIS — I89.0 LYMPHEDEMA OF RIGHT UPPER EXTREMITY: ICD-10-CM

## 2018-10-23 DIAGNOSIS — I97.2 POST-MASTECTOMY LYMPHEDEMA SYNDROME: Primary | ICD-10-CM

## 2018-10-23 DIAGNOSIS — N64.4 MASTODYNIA OF LEFT BREAST: ICD-10-CM

## 2018-10-23 DIAGNOSIS — L90.5 SCAR CONDITIONS AND FIBROSIS OF SKIN: ICD-10-CM

## 2018-10-23 PROCEDURE — 77412 RADIATION TX DELIVERY LVL 3: CPT | Performed by: RADIOLOGY

## 2018-10-23 PROCEDURE — 77387 GUIDANCE FOR RADJ TX DLVR: CPT | Performed by: RADIOLOGY

## 2018-10-23 PROCEDURE — 97140 MANUAL THERAPY 1/> REGIONS: CPT | Performed by: PHYSICAL THERAPIST

## 2018-10-23 NOTE — THERAPY PROGRESS REPORT/RE-CERT
"    Outpatient Physical Therapy Lymphedema Progress Note  [facility]     Patient Name: Tia Sommers  : 1970  MRN: [id number]  Today's Date: 10/23/2018        Visit Date: 10/23/2018    Visit Dx:    ICD-10-CM ICD-9-CM   1. 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 Lymphedema of right upper extremity I89.0 457.1   3. Scar conditions and fibrosis of skin L90.5 709.2   4. Mastodynia of left breast N64.4 611.71   5. Lymphedema of left upper extremity I89.0 457.1   6. Impaired function of upper extremity R68.89 V49.5       Patient Active Problem List   Diagnosis   • Malignant neoplasm of upper-inner quadrant of left breast in female, estrogen receptor positive (CMS/HCC)   • Epigastric pain   • Elevated LFTs   • Abnormal CT of the chest   • Malignant neoplasm of left breast in female, estrogen receptor positive (CMS/HCC)   • History of ITP   • Lupus              Lymphedema     Row Name 10/23/18 1300          Able to rate subjective pain? yes  -MW    Pre-Treatment Pain Level 8  -MW    Post-Treatment Pain Level 8  -MW    Subjective Pain Comment Chest sore; Lt >>RT; also joints are sore all over; Lt wrist   -MW          Subjective Comments Pt reports general increase in pain; arthritis, swelling at wrist Lt, but also more swelling lateral trunk Lt >>RT. \"making it\"   -MW          Ptting Edema Category By severity  -MW    Pitting Edema Moderate  -MW    Edema Assessment Comment Mod RUE, bilat lateral trunk areas LT>>RT, LUE mild with mild to mod at wrist.   -MW          Location/Assessment Upper Extremity;Upper Quadrant  -MW    Upper Extremity Conditions bilateral:;intact;clean;left:;other (comment)   LT multiple tatoos  -MW    Upper Extremity Color/Pigment bilateral:;normal  -MW    Upper Quadrant Conditions bilateral:;intact  -MW    Upper Quadrant Color/Pigment right:;hyperpigmented;left:;other (comment)   area of XRT \"tan\"; Lt XRT stickers and markings   -MW          Lymphedema Sensation Comments Lt " chest with increased sensitivity superior to incision line and toward lateral aspect / area of tightness   -MW          Quick Girth Areas Upper extremities  -MW          Axilla 31.5 cm  -MW    Mid upper arm 32 cm  -MW    Elbow 26.3 cm  -MW    Mid forearm 22.8 cm  -MW    Wrist crease 16 cm  -MW    Web space 19 cm  -MW    Met-heads 18.3 cm  -MW    LUE Quick Girth Total 165.9  -MW          Axilla 30.3 cm  -MW    Mid upper arm 32 cm  -MW    Elbow 27.4 cm  -MW    Mid forearm 26.2 cm  -MW    Wrist crease 16.5 cm  -MW    Web space 19.2 cm  -MW    Met-heads 18.4 cm  -MW    RUE Quick Girth Total 170  -MW          Manual Lymphatic Drainage initial sequence;opened regional lymph nodes;opened anastamoses;extremity treatment;astym  -MW    Initial Sequence supraclavicular;shoulder collectors  -MW    Supraclavicular right;left  -MW    Shoulder Collectors right;left  -MW    Abdomen --   deferred due to fullness/ bloating   -MW    Opened Regional Lymph Nodes inguinal  -MW    Inguinal right;left  -MW    Opened Anastamoses axillo-inguinal  -MW    Axillo-Inguinal right;left  -MW    Axillo-Inguinal Comment MLD focused to lateral trunk fullness; LT > RT   -MW    Extremity Treatment MLD to full limb  -MW    Simple/Brief MLD LUE  -MW    Astym mastectomy protocol  -MW    Mastectomy Protocol supine;sitting  -MW    Mastectomy Protocol Comment Seated on stool with upper body resting on face craddle to focus on Rt & Lt upper back/ scapular area with evaluator and localizer; extra strokes to leveator scap, mid traps and teres mm groups. Continued strokes into lateral trunk adjacent to breast area. In supine: Evaluator and localizer to Rt and Lt chest, working around port on Lt and gently over XRT stickers and area of tenderness. Mild to moderate soft tissue disruptions Rt chest, mild Lt chest. Continued ASTYM into lateral trunk / ribs; mild soft tissue disruptions on Rt, Lt smooth. Repositioned in shoulder flex/ ABD to open axilla (partially  "able to achieve position bilat) and work on pectoralis tightness, soft tissue restrictions extra strokes bilat; very gentle over Lt.   -MW    Manual Lymphatic Drainage Comments MLD mixed with STM post ASTYM working around trunk with position changes.   -MW          Compression/Skin Care skin care;wrapping location;bandaging;compression garment  -MW    Skin Care moisturizing lotion applied   residual cocoa butter on trunk   -MW    Wrapping Location upper extremity  -MW    Wrapping Location UE right:;hand to axilla  -MW      User Key  (r) = Recorded By, (t) = Taken By, (c) = Cosigned By    Initials Name Provider Type    MW Jennifer Noriega, PT Physical Therapist                              PT Assessment/Plan     Row Name 10/23/18 1300          Functional Limitations Performance in self-care ADL;Limitation in home management;Performance in work activities  -    Impairments Edema;Impaired lymphatic circulation;Impaired postural alignment;Muscle strength;Pain;Range of motion;Sensation  -MW    Assessment Comments Pt has completed 5 XRT session for Lt BrCA. She displays increased Lt lateral trunk fullness, as well as increased lymphedema in bilateral UE this visit. Pt also notes \"arthritis flair up\" which is making her more sore all over and Lt wrist more puffy. Pt with good effort for self care and self management of Bilat UE lymphedema Rt > Lt as well as trunk wall lymphedema Lt > Rt. ROM not directly assessed this visit as pt already in more pain than is typical for her; shoulders remain limited as observed during positioning for ASTYM and MLD. Recommend continued PT to assist with pain control, edema management, minimize soft tissue restrictions to maximize function and QOL.   -MW    Rehab Potential Fair  -MW    Patient/caregiver participated in establishment of treatment plan and goals Yes  -MW    Patient would benefit from skilled therapy intervention Yes  -MW          PT Frequency 1x/week  -MW    Predicted " "Duration of Therapy Intervention (Therapy Eval) 18 visits   -    Planned CPT's? PT MANUAL THERAPY EA 15 MIN: 04342;PT THER PROC EA 15 MIN: 59175  -MW    Physical Therapy Interventions (Optional Details) manual therapy techniques;manual lymphatic drainage;postural re-education;ROM (Range of Motion);stretching;strengthening;home exercise program  -    PT Plan Comments Cont ASTYM/STM, MLD; progress Ther exer marina LT shoulder ROM/ flexibility as pain allows. Pt to obtain LUE arm sleeve from [employer] link. Check benefit of Komprex to Lt lateral trunk fullness   -MW      User Key  (r) = Recorded By, (t) = Taken By, (c) = Cosigned By    Initials Name Provider Type    Jennifer Solorio, PT Physical Therapist                     Exercises     Row Name 10/23/18 1300             Subjective Comments    Subjective Comments Pt reports general increase in pain; arthritis, swelling at wrist Lt, but also more swelling lateral trunk Lt >>RT. \"making it\"   -MW         Subjective Pain    Able to rate subjective pain? yes  -MW      Pre-Treatment Pain Level 8  -MW      Post-Treatment Pain Level 8  -MW      Subjective Pain Comment Chest sore; Lt >>RT; also joints are sore all over; Lt wrist   -MW         Total Minutes    23614 - PT Manual Therapy Minutes 55  -MW         Exercise 1    Exercise Name 1 RT HB wings   -MW      Cueing 1 Tactile  -MW      Reps 1 9  -MW      Additional Comments concurrent with Manual therapy  -MW         Exercise 2    Exercise Name 2 LT HBH wings   -MW      Cueing 2 Tactile  -MW      Reps 2 9  -MW      Additional Comments concurrent with Manual therapy; very gentle   -MW        User Key  (r) = Recorded By, (t) = Taken By, (c) = Cosigned By    Initials Name Provider Type    Jennifer Solorio, PT Physical Therapist                       Manual Rx (last 36 hours)      Manual Treatments     Row Name 10/23/18 1300             Total Minutes    08347 - PT Manual Therapy Minutes 55  -MW         Manual Rx 1    " Manual Rx 1 Location Bilat chest / mastectomy sites   -MW      Manual Rx 1 Type STM with tissue bending, lifting, rolling and space correction techniques at lateral pectoralis border and at incision lines   -MW      Manual Rx 1 Grade moderate Rt; gentle to very gentle Lt   -MW      Manual Rx 1 Duration 20  -MW         Manual Rx 2    Manual Rx 2 Location LT < RT upper trap, mid trap, leveator scap and teres mm groups wrapping around into posterior axilla   -MW      Manual Rx 2 Type STM with tissue bending and lifting techniques   -MW      Manual Rx 2 Grade gentle to moderate; deep breathing with trigger point release to subscap  -MW      Manual Rx 2 Duration 10  -MW        User Key  (r) = Recorded By, (t) = Taken By, (c) = Cosigned By    Initials Name Provider Type    MW Jennifer Noriega, PT Physical Therapist                PT OP Goals     Row Name 10/23/18 1300          STG 1 Pt independent with basic HEP for shoulder ROM and posture.   -MW    STG 1 Progress Met  -MW    STG 2 RUE lymphedema to decrease at least 10%.   -MW    STG 2 Progress Partially Met  -MW    STG 3 LT shoulder flexibility to improve at least 15 degrees to improve self care and prepare for positioning for XRT.   -MW    STG 3 Progress Met  -MW    STG 4 Pt to report decreased pain ratings on DASH to 3/5 or less.   -MW    STG 4 Progress Met  -MW          LTG 1 Pt independent with advanced HEP for posture, flexibility and strengthening to promote safe self care.   -MW    LTG 1 Progress Ongoing;Progressing  -MW    LTG 2 Pt to demonstrate improved function with at least 20% improvement on DASH score.   -MW    LTG 2 Progress Met  -MW    LTG 3 Bilat shoulder flexion to be greater than 160 deg, for pt to be able to achieve positioning for XRT.   -MW    LTG 3 Progress Ongoing;Progressing  -MW    LTG 4 Pt measured for LUE arm sleeve; check fit   -MW    LTG 4 Progress New  -MW    LTG 5 Pt to be independent with self lymphedema care; including use of  compression pump, compression sleeves/ gloves and skin care.   -MW    LTG 5 Progress Progressing;Partially Met  -MW    LTG 5 Progress Comments Added Komprex foam to focus on Lt lateral trunk fullness   -MW          PT Goal Re-Cert Due Date 12/20/18  -      User Key  (r) = Recorded By, (t) = Taken By, (c) = Cosigned By    Initials Name Provider Type    MW Jennifer Noriega, PT Physical Therapist          Therapy Education  Education Details: Provided info for pt to contact [place] [facility] about LUE arm sleeve; pt to pump RUE when she gets home. Komprex to Lt lateral trunk fullness; move to Rt prn.   Given: Edema management, Symptoms/condition management  Program: Reinforced  How Provided: Verbal, Written  Provided to: Patient  Level of Understanding: Verbalized, Teach back education performed              Time Calculation:   Start Time: 1300  Total Timed Code Minutes- PT: 60 minute(s)   Therapy Suggested Charges     Code   Minutes Charges    19404 (CPT®) Hc Pt Neuromusc Re Education Ea 15 Min      26286 (CPT®) Hc Pt Ther Proc Ea 15 Min      42127 (CPT®) Hc Gait Training Ea 15 Min      57495 (CPT®) Hc Pt Therapeutic Act Ea 15 Min      37985 (CPT®) Hc Pt Manual Therapy Ea 15 Min 55 4    24967 (CPT®) Hc Pt Ther Massage- Per 15 Min      99700 (CPT®) Hc Pt Iontophoresis Ea 15 Min      36547 (CPT®) Hc Pt Elec Stim Ea-Per 15 Min      87406 (CPT®) Hc Pt Ultrasound Ea 15 Min      07700 (CPT®) Hc Pt Self Care/Mgmt/Train Ea 15 Min      68877 (CPT®) Hc Pt Prosthetic (S) Train Initial Encounter, Each 15 Min      30116 (CPT®) Hc Orthotic(S) Mgmt/Train Initial Encounter, Each 15min      08087 (CPT®) Hc Pt Aquatic Therapy Ea 15 Min      54361 (CPT®) Hc Pt Orthotic(S)/Prosthetic(S) Encounter, Each 15 Min       (CPT®) Hc Pt Electrical Stim Unattended      Total  55 4        Therapy Charges for Today     Code Description Service Date Service Provider Modifiers Qty    60764340553 HC PT MANUAL THERAPY EA 15 MIN 10/23/2018 Leann  Jennifer, PT GP 4                    Jennifer Noriega, PT  10/23/2018

## 2018-10-24 ENCOUNTER — HOSPITAL ENCOUNTER (OUTPATIENT)
Dept: RADIATION ONCOLOGY | Facility: HOSPITAL | Age: 48
Discharge: HOME | End: 2018-10-24

## 2018-10-24 PROCEDURE — 77387 GUIDANCE FOR RADJ TX DLVR: CPT | Performed by: RADIOLOGY

## 2018-10-24 PROCEDURE — 77412 RADIATION TX DELIVERY LVL 3: CPT | Performed by: RADIOLOGY

## 2018-10-25 ENCOUNTER — HOSPITAL ENCOUNTER (OUTPATIENT)
Dept: RADIATION ONCOLOGY | Facility: HOSPITAL | Age: 48
Discharge: HOME | End: 2018-10-25

## 2018-10-25 PROCEDURE — 77412 RADIATION TX DELIVERY LVL 3: CPT | Performed by: RADIOLOGY

## 2018-10-25 PROCEDURE — 77336 RADIATION PHYSICS CONSULT: CPT | Performed by: RADIOLOGY

## 2018-10-25 PROCEDURE — 77387 GUIDANCE FOR RADJ TX DLVR: CPT | Performed by: RADIOLOGY

## 2018-10-26 ENCOUNTER — HOSPITAL ENCOUNTER (OUTPATIENT)
Dept: RADIATION ONCOLOGY | Facility: HOSPITAL | Age: 48
Discharge: HOME | End: 2018-10-26

## 2018-10-26 PROCEDURE — 77387 GUIDANCE FOR RADJ TX DLVR: CPT | Performed by: RADIOLOGY

## 2018-10-26 PROCEDURE — 77412 RADIATION TX DELIVERY LVL 3: CPT | Performed by: RADIOLOGY

## 2018-10-29 ENCOUNTER — HOSPITAL ENCOUNTER (OUTPATIENT)
Dept: RADIATION ONCOLOGY | Facility: HOSPITAL | Age: 48
Discharge: HOME | End: 2018-10-29

## 2018-10-29 ENCOUNTER — HOSPITAL ENCOUNTER (OUTPATIENT)
Dept: PHYSICAL THERAPY | Facility: HOSPITAL | Age: 48
Setting detail: THERAPIES SERIES
Discharge: HOME | End: 2018-10-29
Attending: SURGERY

## 2018-10-29 DIAGNOSIS — I97.2 POST-MASTECTOMY LYMPHEDEMA SYNDROME: Primary | ICD-10-CM

## 2018-10-29 DIAGNOSIS — N64.4 MASTODYNIA OF LEFT BREAST: ICD-10-CM

## 2018-10-29 DIAGNOSIS — I89.0 LYMPHEDEMA OF RIGHT UPPER EXTREMITY: ICD-10-CM

## 2018-10-29 DIAGNOSIS — L90.5 SCAR CONDITIONS AND FIBROSIS OF SKIN: ICD-10-CM

## 2018-10-29 DIAGNOSIS — I89.0 LYMPHEDEMA OF LEFT UPPER EXTREMITY: ICD-10-CM

## 2018-10-29 DIAGNOSIS — R68.89 IMPAIRED FUNCTION OF UPPER EXTREMITY: ICD-10-CM

## 2018-10-29 PROCEDURE — 97140 MANUAL THERAPY 1/> REGIONS: CPT | Performed by: PHYSICAL THERAPIST

## 2018-10-29 PROCEDURE — 77412 RADIATION TX DELIVERY LVL 3: CPT | Performed by: RADIOLOGY

## 2018-10-29 PROCEDURE — 77387 GUIDANCE FOR RADJ TX DLVR: CPT | Performed by: RADIOLOGY

## 2018-10-29 NOTE — THERAPY TREATMENT NOTE
"    Outpatient Physical Therapy Lymphedema Treatment Note  [facility]     Patient Name: Tia Sommers  : 1970  MRN: [id number]  Today's Date: 10/29/2018        Visit Date: 10/29/2018    Visit Dx:    ICD-10-CM ICD-9-CM   1. 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 Lymphedema of right upper extremity I89.0 457.1   3. Scar conditions and fibrosis of skin L90.5 709.2   4. Mastodynia of left breast N64.4 611.71   5. Lymphedema of left upper extremity I89.0 457.1   6. Impaired function of upper extremity R68.89 V49.5       Patient Active Problem List   Diagnosis   • Malignant neoplasm of upper-inner quadrant of left breast in female, estrogen receptor positive (CMS/HCC)   • Epigastric pain   • Elevated LFTs   • Abnormal CT of the chest   • Malignant neoplasm of left breast in female, estrogen receptor positive (CMS/HCC)   • History of ITP   • Lupus              Lymphedema     Row Name 10/29/18 1300          Able to rate subjective pain? yes  -MW    Pre-Treatment Pain Level 6  -MW    Post-Treatment Pain Level 7  -MW    Subjective Pain Comment Lt chest 6 & 7; rest of body 8 overall   -MW          Subjective Comments Pt reports very sore; Lupus is flairing up and has kind that attacks bone and muscle, but can't take any thing for it due to XRT. Has some pain meds that help \"a little\" but GI doctor recommended she not take them too frequently as they can stress out her GI system, etc. Pt is also still waiting on SNAP coupons.   -MW          Ptting Edema Category By severity  -MW    Pitting Edema Moderate  -MW    Edema Assessment Comment Mod RUE, mod to mild bilat trunk areas; LUE mild. Pt notes foam has been helpful for Lt trunk, requesting a second piece for RT side too.   -MW          Location/Assessment Upper Extremity;Upper Quadrant  -MW    Upper Extremity Conditions bilateral:;intact;clean;left:;other (comment)   LT multiple tatoos  -MW    Upper Extremity Color/Pigment bilateral:;normal  -MW    " "Upper Quadrant Conditions bilateral:;intact  -MW    Upper Quadrant Color/Pigment right:;hyperpigmented;left:;other (comment)   area of XRT \"tan\"; Lt XRT stickers and markings   -MW    Upper Quadrant Skin Details Lt chest very dry but no erythema   -MW          Lymphedema Sensation Comments Lt and Rt chest more sensitive to manual therapies; Lt >>RT   -MW       Manual Lymphatic Drainage initial sequence;opened regional lymph nodes;opened anastamoses;extremity treatment;astym  -MW    Initial Sequence supraclavicular;shoulder collectors  -MW    Supraclavicular right;left  -MW    Shoulder Collectors right;left  -MW    Abdomen --   deferred due to fullness/ bloating   -MW    Opened Regional Lymph Nodes inguinal  -MW    Inguinal right;left  -MW    Opened Anastamoses axillo-inguinal  -MW    Axillo-Inguinal right;left  -MW    Extremity Treatment MLD to full limb  -MW    Astym mastectomy protocol  -MW    Mastectomy Protocol supine;sitting  -MW          Compression/Skin Care skin care;wrapping location;bandaging;compression garment  -MW    Skin Care moisturizing lotion applied   residual cocoa butter on trunk   -MW    Wrapping Location upper extremity  -MW    Wrapping Location UE right:;hand to axilla  -MW    Compression Garment Comments Assisted pt to don RUE arm sleeve; Pt reports [place] [facility] link does not have measurements for LUE arm sleeve. Refaxed measurements this date.   -MW      User Key  (r) = Recorded By, (t) = Taken By, (c) = Cosigned By    Initials Name Provider Type    MW Jennifer Noriega, PT Physical Therapist                              PT Assessment/Plan     Row Name 10/29/18 1300          PT Assessment    Functional Limitations Performance in self-care ADL;Limitation in home management;Performance in work activities  -MW     Impairments Edema;Impaired lymphatic circulation;Impaired postural alignment;Muscle strength;Pain;Range of motion;Sensation  -MW     Assessment Comments Pt continues to have significant " "reported body pain due to \"lupus flair up.\" This is limiting her tolerance to STM/ manual therapy techniques. She seems to be maintaining adequate ROM for Lt chest XRT. Skin remains in good condition though a bit dry on the Left. Lateral trunk fullness slightly improved with Komprex to Lt side; so issued second piece of Komprex for Rt lateral trunk as this area is also more full. Cont PT as able to maximize function and QOL during and after this XRT treatment phase.   -MW     Rehab Potential Fair  -MW     Patient/caregiver participated in establishment of treatment plan and goals Yes  -MW     Patient would benefit from skilled therapy intervention Yes  -MW        PT Plan    PT Frequency 1x/week  -MW     Physical Therapy Interventions (Optional Details) manual therapy techniques;manual lymphatic drainage;postural re-education;ROM (Range of Motion);stretching;strengthening;home exercise program  -     PT Plan Comments Cont ASTYM/STM, MLD as tolerated; progress Ther exer marina LT shoulder ROM/ flexibility as pain allows. Ck fit of LUE sleeve   -       User Key  (r) = Recorded By, (t) = Taken By, (c) = Cosigned By    Initials Name Provider Type    Jennifer Solorio, PT Physical Therapist                     Exercises     Row Name 10/29/18 1300             Subjective Comments    Subjective Comments Pt reports very sore; Lupus is flairing up and has kind that attacks bone and muscle, but can't take any thing for it due to XRT. Has some pain meds that help \"a little\" but GI doctor recommended she not take them too frequently as they can stress out her GI system, etc. Pt is also still waiting on SNAP coupons.   -MW         Subjective Pain    Able to rate subjective pain? yes  -MW      Pre-Treatment Pain Level 6  -MW      Post-Treatment Pain Level 7  -MW      Subjective Pain Comment Lt chest 6 & 7; rest of body 8 overall   -MW         Total Minutes    41283 - PT Manual Therapy Minutes 55  -MW         Exercise 1    " Exercise Name 1 RT HBH wings   -MW      Cueing 1 Tactile  -MW      Reps 1 9  -MW      Additional Comments concurrent with Manual therapy  -MW         Exercise 2    Exercise Name 2 LT HBH wings   -MW      Cueing 2 Tactile  -MW      Reps 2 9  -MW      Additional Comments concurrent with Manual therapy  -MW        User Key  (r) = Recorded By, (t) = Taken By, (c) = Cosigned By    Initials Name Provider Type    MW Jennifer Noriega, PT Physical Therapist                       Manual Rx (last 36 hours)      Manual Treatments     Row Name 10/29/18 1300             Total Minutes    49497 - PT Manual Therapy Minutes 55  -MW         Manual Rx 1    Manual Rx 1 Location Bilat chest / mastectomy sites   -MW      Manual Rx 1 Type STM with tissue bending, lifting, rolling and space correction techniques at lateral pectoralis border and at incision lines   -MW      Manual Rx 1 Grade gentle to very gentle   -MW      Manual Rx 1 Duration 20  -MW         Manual Rx 2    Manual Rx 2 Location LT < RT upper trap, mid trap, leveator scap and teres mm groups wrapping around into posterior axilla   -MW      Manual Rx 2 Type STM with tissue bending and lifting techniques   -MW      Manual Rx 2 Grade gentle to moderate; deep breathing with trigger point release to subscap  -MW      Manual Rx 2 Duration 15  -MW        User Key  (r) = Recorded By, (t) = Taken By, (c) = Cosigned By    Initials Name Provider Type    Jennifer Solorio, PT Physical Therapist              Therapy Education  Education Details: Cont HEP, lymph pump, compression and self massage; call for follow up if needed while this PT on vacation. Cont Lateral trunk compression with Komprex inserts.   Given: Edema management, Symptoms/condition management  Program: Reinforced  How Provided: Verbal, Written  Provided to: Patient  Level of Understanding: Verbalized, Teach back education performed              Time Calculation:   Start Time: 1300  Total Timed Code Minutes- PT: 55  minute(s)   Therapy Suggested Charges     Code   Minutes Charges    92353 (CPT®) Hc Pt Neuromusc Re Education Ea 15 Min      16662 (CPT®) Hc Pt Ther Proc Ea 15 Min      83554 (CPT®) Hc Gait Training Ea 15 Min      68545 (CPT®) Hc Pt Therapeutic Act Ea 15 Min      23456 (CPT®) Hc Pt Manual Therapy Ea 15 Min 55 4    40678 (CPT®) Hc Pt Ther Massage- Per 15 Min      73593 (CPT®) Hc Pt Iontophoresis Ea 15 Min      00391 (CPT®) Hc Pt Elec Stim Ea-Per 15 Min      06145 (CPT®) Hc Pt Ultrasound Ea 15 Min      76085 (CPT®) Hc Pt Self Care/Mgmt/Train Ea 15 Min      84301 (CPT®) Hc Pt Prosthetic (S) Train Initial Encounter, Each 15 Min      29962 (CPT®) Hc Orthotic(S) Mgmt/Train Initial Encounter, Each 15min      29627 (CPT®) Hc Pt Aquatic Therapy Ea 15 Min      70301 (CPT®) Hc Pt Orthotic(S)/Prosthetic(S) Encounter, Each 15 Min       (CPT®) Hc Pt Electrical Stim Unattended      Total  55 4        Therapy Charges for Today     Code Description Service Date Service Provider Modifiers Qty    31453490056 HC PT MANUAL THERAPY EA 15 MIN 10/29/2018 Jennifer Noriega, PT GP 4                    Jennifer Noriega, PT  10/29/2018

## 2018-10-30 ENCOUNTER — HOSPITAL ENCOUNTER (OUTPATIENT)
Dept: RADIATION ONCOLOGY | Facility: HOSPITAL | Age: 48
Discharge: HOME | End: 2018-10-30

## 2018-10-30 ENCOUNTER — INFUSION (OUTPATIENT)
Dept: ONCOLOGY | Facility: HOSPITAL | Age: 48
End: 2018-10-30

## 2018-10-30 VITALS
HEIGHT: 66 IN | WEIGHT: 170 LBS | SYSTOLIC BLOOD PRESSURE: 151 MMHG | BODY MASS INDEX: 27.32 KG/M2 | HEART RATE: 86 BPM | DIASTOLIC BLOOD PRESSURE: 85 MMHG | RESPIRATION RATE: 18 BRPM | TEMPERATURE: 98.8 F

## 2018-10-30 VITALS — WEIGHT: 168.8 LBS | BODY MASS INDEX: 27.26 KG/M2

## 2018-10-30 DIAGNOSIS — Z17.0 MALIGNANT NEOPLASM OF UPPER-INNER QUADRANT OF LEFT BREAST IN FEMALE, ESTROGEN RECEPTOR POSITIVE (HCC): ICD-10-CM

## 2018-10-30 DIAGNOSIS — M32.9 SYSTEMIC LUPUS ERYTHEMATOSUS, UNSPECIFIED SLE TYPE, UNSPECIFIED ORGAN INVOLVEMENT STATUS (HCC): Primary | ICD-10-CM

## 2018-10-30 DIAGNOSIS — C50.212 MALIGNANT NEOPLASM OF UPPER-INNER QUADRANT OF LEFT BREAST IN FEMALE, ESTROGEN RECEPTOR POSITIVE (HCC): ICD-10-CM

## 2018-10-30 LAB
ALBUMIN SERPL-MCNC: 3.8 G/DL (ref 3.2–4.8)
ALBUMIN/GLOB SERPL: 1.5 G/DL (ref 1.5–2.5)
ALP SERPL-CCNC: 86 U/L (ref 25–100)
ALT SERPL W P-5'-P-CCNC: 60 U/L (ref 7–40)
ANION GAP SERPL CALCULATED.3IONS-SCNC: 6 MMOL/L (ref 3–11)
AST SERPL-CCNC: 50 U/L (ref 0–33)
BACTERIA UR QL AUTO: ABNORMAL /HPF
BILIRUB SERPL-MCNC: 0.8 MG/DL (ref 0.3–1.2)
BILIRUB UR QL STRIP: NEGATIVE
BUN BLD-MCNC: 11 MG/DL (ref 9–23)
BUN/CREAT SERPL: 17.2 (ref 7–25)
CALCIUM SPEC-SCNC: 8.6 MG/DL (ref 8.7–10.4)
CHLORIDE SERPL-SCNC: 104 MMOL/L (ref 99–109)
CLARITY UR: CLEAR
CO2 SERPL-SCNC: 27 MMOL/L (ref 20–31)
COLOR UR: YELLOW
CREAT BLD-MCNC: 0.64 MG/DL (ref 0.6–1.3)
ERYTHROCYTE [DISTWIDTH] IN BLOOD BY AUTOMATED COUNT: 17.5 % (ref 11.3–14.5)
GFR SERPL CREATININE-BSD FRML MDRD: 119 ML/MIN/1.73
GFR SERPL CREATININE-BSD FRML MDRD: 98 ML/MIN/1.73
GGT SERPL-CCNC: 89 U/L (ref 0–37)
GLOBULIN UR ELPH-MCNC: 2.6 GM/DL
GLUCOSE BLD-MCNC: 103 MG/DL (ref 70–100)
GLUCOSE UR STRIP-MCNC: NEGATIVE MG/DL
HCT VFR BLD AUTO: 36.1 % (ref 34.5–44)
HGB BLD-MCNC: 11.5 G/DL (ref 11.5–15.5)
HGB UR QL STRIP.AUTO: NEGATIVE
HYALINE CASTS UR QL AUTO: ABNORMAL /LPF
KETONES UR QL STRIP: NEGATIVE
LEUKOCYTE ESTERASE UR QL STRIP.AUTO: ABNORMAL
LYMPHOCYTES # BLD AUTO: 1.9 10*3/MM3 (ref 0.6–4.8)
LYMPHOCYTES NFR BLD AUTO: 49.1 % (ref 24–44)
MCH RBC QN AUTO: 27.5 PG (ref 27–31)
MCHC RBC AUTO-ENTMCNC: 31.9 G/DL (ref 32–36)
MCV RBC AUTO: 86.2 FL (ref 80–99)
MONOCYTES # BLD AUTO: 0.2 10*3/MM3 (ref 0–1)
MONOCYTES NFR BLD AUTO: 5.8 % (ref 0–12)
NEUTROPHILS # BLD AUTO: 1.8 10*3/MM3 (ref 1.5–8.3)
NEUTROPHILS NFR BLD AUTO: 45.1 % (ref 41–71)
NITRITE UR QL STRIP: NEGATIVE
PH UR STRIP.AUTO: <=5 [PH] (ref 5–8)
PLATELET # BLD AUTO: 246 10*3/MM3 (ref 150–450)
PMV BLD AUTO: 8.3 FL (ref 6–12)
POTASSIUM BLD-SCNC: 3.7 MMOL/L (ref 3.5–5.5)
PROT SERPL-MCNC: 6.4 G/DL (ref 5.7–8.2)
PROT UR QL STRIP: NEGATIVE
RBC # BLD AUTO: 4.19 10*6/MM3 (ref 3.89–5.14)
RBC # UR: ABNORMAL /HPF
REF LAB TEST METHOD: ABNORMAL
SODIUM BLD-SCNC: 137 MMOL/L (ref 132–146)
SP GR UR STRIP: 1.02 (ref 1–1.03)
SQUAMOUS #/AREA URNS HPF: ABNORMAL /HPF
UROBILINOGEN UR QL STRIP: ABNORMAL
WBC NRBC COR # BLD: 3.9 10*3/MM3 (ref 3.5–10.8)
WBC UR QL AUTO: ABNORMAL /HPF

## 2018-10-30 PROCEDURE — 86160 COMPLEMENT ANTIGEN: CPT

## 2018-10-30 PROCEDURE — 86200 CCP ANTIBODY: CPT

## 2018-10-30 PROCEDURE — 82977 ASSAY OF GGT: CPT

## 2018-10-30 PROCEDURE — 86430 RHEUMATOID FACTOR TEST QUAL: CPT

## 2018-10-30 PROCEDURE — 77387 GUIDANCE FOR RADJ TX DLVR: CPT | Performed by: RADIOLOGY

## 2018-10-30 PROCEDURE — 86038 ANTINUCLEAR ANTIBODIES: CPT

## 2018-10-30 PROCEDURE — 85025 COMPLETE CBC W/AUTO DIFF WBC: CPT

## 2018-10-30 PROCEDURE — 77412 RADIATION TX DELIVERY LVL 3: CPT | Performed by: RADIOLOGY

## 2018-10-30 PROCEDURE — 80053 COMPREHEN METABOLIC PANEL: CPT

## 2018-10-30 PROCEDURE — 81001 URINALYSIS AUTO W/SCOPE: CPT

## 2018-10-30 PROCEDURE — 36591 DRAW BLOOD OFF VENOUS DEVICE: CPT

## 2018-10-30 RX ORDER — SODIUM CHLORIDE 0.9 % (FLUSH) 0.9 %
10 SYRINGE (ML) INJECTION AS NEEDED
Status: DISCONTINUED | OUTPATIENT
Start: 2018-10-30 | End: 2018-10-30 | Stop reason: HOSPADM

## 2018-10-30 RX ORDER — SODIUM CHLORIDE 0.9 % (FLUSH) 0.9 %
10 SYRINGE (ML) INJECTION AS NEEDED
Status: CANCELLED | OUTPATIENT
Start: 2018-10-30

## 2018-10-30 RX ADMIN — Medication 10 ML: at 13:55

## 2018-10-30 RX ADMIN — HEPARIN 500 UNITS: 100 SYRINGE at 13:55

## 2018-10-31 ENCOUNTER — HOSPITAL ENCOUNTER (OUTPATIENT)
Dept: RADIATION ONCOLOGY | Facility: HOSPITAL | Age: 48
Discharge: HOME | End: 2018-10-31

## 2018-10-31 LAB
ANA HOMOGEN TITR SER: ABNORMAL {TITER}
ANA SER QL IA: POSITIVE
ANA SER QL: NEGATIVE
C3 SERPL-MCNC: 133 MG/DL (ref 82–167)
C4 SERPL-MCNC: 27 MG/DL (ref 14–44)
Lab: ABNORMAL
RHEUMATOID FACT SERPL-ACNC: NEGATIVE [IU]/ML

## 2018-10-31 PROCEDURE — 77387 GUIDANCE FOR RADJ TX DLVR: CPT | Performed by: RADIOLOGY

## 2018-10-31 PROCEDURE — 77412 RADIATION TX DELIVERY LVL 3: CPT | Performed by: RADIOLOGY

## 2018-11-01 ENCOUNTER — HOSPITAL ENCOUNTER (OUTPATIENT)
Dept: RADIATION ONCOLOGY | Facility: HOSPITAL | Age: 48
Discharge: HOME | End: 2018-11-01

## 2018-11-01 ENCOUNTER — HOSPITAL ENCOUNTER (OUTPATIENT)
Dept: RADIATION ONCOLOGY | Facility: HOSPITAL | Age: 48
Setting detail: RADIATION/ONCOLOGY SERIES
Discharge: HOME | End: 2018-11-01

## 2018-11-01 LAB — CCP IGA+IGG SERPL IA-ACNC: 8 UNITS (ref 0–19)

## 2018-11-01 PROCEDURE — 77387 GUIDANCE FOR RADJ TX DLVR: CPT | Performed by: RADIOLOGY

## 2018-11-01 PROCEDURE — 77412 RADIATION TX DELIVERY LVL 3: CPT | Performed by: RADIOLOGY

## 2018-11-02 ENCOUNTER — HOSPITAL ENCOUNTER (OUTPATIENT)
Dept: RADIATION ONCOLOGY | Facility: HOSPITAL | Age: 48
Discharge: HOME | End: 2018-11-02

## 2018-11-02 PROCEDURE — 77412 RADIATION TX DELIVERY LVL 3: CPT | Performed by: RADIOLOGY

## 2018-11-02 PROCEDURE — 77336 RADIATION PHYSICS CONSULT: CPT | Performed by: RADIOLOGY

## 2018-11-02 PROCEDURE — 77387 GUIDANCE FOR RADJ TX DLVR: CPT | Performed by: RADIOLOGY

## 2018-11-05 ENCOUNTER — HOSPITAL ENCOUNTER (OUTPATIENT)
Dept: RADIATION ONCOLOGY | Facility: HOSPITAL | Age: 48
Discharge: HOME | End: 2018-11-05

## 2018-11-05 PROCEDURE — 77387 GUIDANCE FOR RADJ TX DLVR: CPT | Performed by: RADIOLOGY

## 2018-11-05 PROCEDURE — 77412 RADIATION TX DELIVERY LVL 3: CPT | Performed by: RADIOLOGY

## 2018-11-06 ENCOUNTER — HOSPITAL ENCOUNTER (OUTPATIENT)
Dept: RADIATION ONCOLOGY | Facility: HOSPITAL | Age: 48
Discharge: HOME | End: 2018-11-06

## 2018-11-06 VITALS — WEIGHT: 168.3 LBS | BODY MASS INDEX: 27.18 KG/M2

## 2018-11-06 PROCEDURE — 77387 GUIDANCE FOR RADJ TX DLVR: CPT | Performed by: RADIOLOGY

## 2018-11-06 PROCEDURE — 77412 RADIATION TX DELIVERY LVL 3: CPT | Performed by: RADIOLOGY

## 2018-11-07 ENCOUNTER — HOSPITAL ENCOUNTER (OUTPATIENT)
Dept: CT IMAGING | Facility: HOSPITAL | Age: 48
Discharge: HOME | End: 2018-11-07
Admitting: NURSE PRACTITIONER

## 2018-11-07 ENCOUNTER — HOSPITAL ENCOUNTER (OUTPATIENT)
Dept: RADIATION ONCOLOGY | Facility: HOSPITAL | Age: 48
Discharge: HOME | End: 2018-11-07

## 2018-11-07 DIAGNOSIS — C50.912 MALIGNANT NEOPLASM OF LEFT BREAST IN FEMALE, ESTROGEN RECEPTOR POSITIVE, UNSPECIFIED SITE OF BREAST (HCC): ICD-10-CM

## 2018-11-07 DIAGNOSIS — R93.89 ABNORMAL CT OF THE CHEST: ICD-10-CM

## 2018-11-07 DIAGNOSIS — Z17.0 MALIGNANT NEOPLASM OF LEFT BREAST IN FEMALE, ESTROGEN RECEPTOR POSITIVE, UNSPECIFIED SITE OF BREAST (HCC): ICD-10-CM

## 2018-11-07 PROCEDURE — 77412 RADIATION TX DELIVERY LVL 3: CPT | Performed by: RADIOLOGY

## 2018-11-07 PROCEDURE — 77387 GUIDANCE FOR RADJ TX DLVR: CPT | Performed by: RADIOLOGY

## 2018-11-07 PROCEDURE — 71250 CT THORAX DX C-: CPT

## 2018-11-08 ENCOUNTER — HOSPITAL ENCOUNTER (OUTPATIENT)
Dept: RADIATION ONCOLOGY | Facility: HOSPITAL | Age: 48
Discharge: HOME | End: 2018-11-08

## 2018-11-08 PROCEDURE — 77336 RADIATION PHYSICS CONSULT: CPT | Performed by: RADIOLOGY

## 2018-11-08 PROCEDURE — 77412 RADIATION TX DELIVERY LVL 3: CPT | Performed by: RADIOLOGY

## 2018-11-08 PROCEDURE — 77387 GUIDANCE FOR RADJ TX DLVR: CPT | Performed by: RADIOLOGY

## 2018-11-09 ENCOUNTER — HOSPITAL ENCOUNTER (OUTPATIENT)
Dept: RADIATION ONCOLOGY | Facility: HOSPITAL | Age: 48
Discharge: HOME | End: 2018-11-09

## 2018-11-09 PROCEDURE — 77387 GUIDANCE FOR RADJ TX DLVR: CPT | Performed by: RADIOLOGY

## 2018-11-09 PROCEDURE — 77412 RADIATION TX DELIVERY LVL 3: CPT | Performed by: RADIOLOGY

## 2018-11-12 ENCOUNTER — HOSPITAL ENCOUNTER (OUTPATIENT)
Dept: RADIATION ONCOLOGY | Facility: HOSPITAL | Age: 48
Discharge: HOME | End: 2018-11-12

## 2018-11-12 ENCOUNTER — HOSPITAL ENCOUNTER (OUTPATIENT)
Dept: PHYSICAL THERAPY | Facility: HOSPITAL | Age: 48
Setting detail: THERAPIES SERIES
Discharge: HOME | End: 2018-11-12
Attending: SURGERY

## 2018-11-12 DIAGNOSIS — R68.89 IMPAIRED FUNCTION OF UPPER EXTREMITY: ICD-10-CM

## 2018-11-12 DIAGNOSIS — I89.0 LYMPHEDEMA OF LEFT UPPER EXTREMITY: ICD-10-CM

## 2018-11-12 DIAGNOSIS — L90.5 SCAR CONDITIONS AND FIBROSIS OF SKIN: ICD-10-CM

## 2018-11-12 DIAGNOSIS — N64.4 MASTODYNIA OF LEFT BREAST: Primary | ICD-10-CM

## 2018-11-12 PROCEDURE — 77387 GUIDANCE FOR RADJ TX DLVR: CPT | Performed by: RADIOLOGY

## 2018-11-12 PROCEDURE — 77412 RADIATION TX DELIVERY LVL 3: CPT | Performed by: RADIOLOGY

## 2018-11-12 PROCEDURE — 97140 MANUAL THERAPY 1/> REGIONS: CPT | Performed by: PHYSICAL THERAPIST

## 2018-11-12 NOTE — THERAPY TREATMENT NOTE
"    Outpatient Physical Therapy Lymphedema Treatment Note  [facility]     Patient Name: Tia Sommers  : 1970  MRN: [id number]  Today's Date: 2018        Visit Date: 2018    Visit Dx:    ICD-10-CM ICD-9-CM   1. Mastodynia of left breast N64.4 611.71   2. Scar conditions and fibrosis of skin L90.5 709.2   3. Lymphedema of left upper extremity I89.0 457.1   4. Impaired function of upper extremity R68.89 V49.5       Patient Active Problem List   Diagnosis   • Malignant neoplasm of upper-inner quadrant of left breast in female, estrogen receptor positive (CMS/HCC)   • Epigastric pain   • Elevated LFTs   • Abnormal CT of the chest   • Malignant neoplasm of left breast in female, estrogen receptor positive (CMS/HCC)   • History of ITP   • Lupus        Lymphedema     Row Name 18 1335          Able to rate subjective pain?  yes  -MW    Pre-Treatment Pain Level  8  -MW    Post-Treatment Pain Level  8  -MW    Subjective Pain Comment  Lt chest / lower ribs and \"pocket\"  -MW          Subjective Comments  Pt reports that she fell yesterday at home; knees gave out. Neighbor came over and helped her figure out how to get up; neighbor did not lift her up. Pt apologized for being late, but [facility] was running late; pt tired from walking across campus to get to PT. Agrees to have transport take her back to Onc [facility] area post tx.   -MW          Ptting Edema Category  By severity  -MW    Pitting Edema  Moderate  -MW    Edema Assessment Comment  Mod RUE, mod to mild bilat trunk areas; LUE mild. Wearing sleeve bilat.   -MW          Location/Assessment  Upper Extremity;Upper Quadrant  -MW    Upper Extremity Conditions  bilateral:;intact;clean;left:;other (comment) LT multiple tatoos  -MW    Upper Extremity Color/Pigment  bilateral:;normal  -MW    Upper Quadrant Conditions  bilateral:;intact  -MW    Upper Quadrant Color/Pigment  right:;hyperpigmented;left:;other (comment) area of XRT \"tan\"; Lt XRT stickers " and markings   -MW    Upper Quadrant Skin Details  Lt lower ribs with faint erythema (that's where I fell) also not faint erythema in Lt axilla (XRT)   -MW          Manual Lymphatic Drainage  initial sequence;opened regional lymph nodes;opened anastamoses;extremity treatment;astym  -MW    Initial Sequence  supraclavicular;shoulder collectors  -MW    Supraclavicular  left  -MW    Shoulder Collectors  left  -MW    Abdomen  -- deferred due to fullness/ bloating   -MW    Opened Regional Lymph Nodes  inguinal  -MW    Inguinal  left  -MW    Opened Anastamoses  axillo-inguinal  -MW    Axillo-Inguinal  left  -MW    Extremity Treatment  MLD to proximal limb only  -MW    MLD to Proximal Limb Only  LUE   -MW    Astym  mastectomy protocol  -MW    Mastectomy Protocol  supine  -MW    Mastectomy Protocol Comment  Due to limited time, focused on LT anterior aspect in supine. Evaluator and localizer to Lt chest, working around port on Lt and gently over XRT stickers and area of tenderness. Mild to moderate soft tissue disruptions Lt chest. Continued ASTYM into lateral trunk / ribs; mild soft tissue disruptions on Lt. Repositioned in shoulder flex/ ABD to open axilla (partially able to achieve position bilat) and work on pectoralis tightness, soft tissue restrictions extra strokes very gentle over Lt.   -MW    Manual Lymphatic Drainage Comments  MLD post ASTYM/ STM   -MW          Compression/Skin Care  skin care;wrapping location;bandaging;compression garment  -    Skin Care  moisturizing lotion applied residual cocoa butter on trunk   -MW    Wrapping Location  upper extremity  -MW    Wrapping Location UE  --  -MW    Compression Garment Comments  assisted pt to don Bilat UE sleeves   -    Compression/Skin Care Comments  LUE sleeve with good fit/ support.   -MW      User Key  (r) = Recorded By, (t) = Taken By, (c) = Cosigned By    Initials Name Provider Type    MW Jennifer Noriega, PT Physical Therapist                        PT  Assessment/Plan     Row Name 11/12/18 1042          PT Assessment    Functional Limitations  Performance in self-care ADL;Limitation in home management;Performance in work activities  -MW     Impairments  Edema;Impaired lymphatic circulation;Impaired postural alignment;Muscle strength;Pain;Range of motion;Sensation  -     Assessment Comments  LT chest skin in good condition with only faint areas of erythema; much less dryness this visit. Pt tender over inferior ribs mid LT chest; no sharp pains. Lt chest more tender to manual techniques as tissues more sensative; less restrictions post tx but still with increased pain overall. LUE sleeve with good fit, arm appears stable. Cont PT  -MW     Rehab Potential  Fair  -MW     Patient/caregiver participated in establishment of treatment plan and goals  Yes  -MW     Patient would benefit from skilled therapy intervention  Yes  -MW        PT Plan    PT Frequency  2x/week  -MW     Physical Therapy Interventions (Optional Details)  manual therapy techniques;manual lymphatic drainage;postural re-education;ROM (Range of Motion);stretching;strengthening;home exercise program  -MW     PT Plan Comments  Cont ASTYM/STM, MLD as tolerated; progress Ther exer marina LT shoulder ROM/ flexibility as pain allows.   -MW       User Key  (r) = Recorded By, (t) = Taken By, (c) = Cosigned By    Initials Name Provider Type    Jennifer Solorio, PT Physical Therapist               Exercises     Row Name 11/12/18 3573             Subjective Comments    Subjective Comments  Pt reports that she fell yesterday at home; knees gave out. Neighbor came over and helped her figure out how to get up; neighbor did not lift her up. Pt apologized for being late, but [facility] was running late; pt tired from walking across campus to get to PT. Agrees to have transport take her back to Onc [facility] post tx.   -MW         Subjective Pain    Able to rate subjective pain?  yes  -MW      Pre-Treatment Pain Level   "8  -MW      Post-Treatment Pain Level  8  -MW      Subjective Pain Comment  Lt chest / lower ribs and \"pocket\"  -MW         Total Minutes    35985 - PT Manual Therapy Minutes  28  -MW         Exercise 2    Exercise Name 2  LT HBH wings   -MW      Cueing 2  Tactile  -MW      Reps 2  9  -MW      Additional Comments  very gentle due to pain; concurrent with STM   -MW        User Key  (r) = Recorded By, (t) = Taken By, (c) = Cosigned By    Initials Name Provider Type    Jennifer Solorio, PT Physical Therapist                       Manual Rx (last 36 hours)      Manual Treatments     Row Name 11/12/18 1335             Total Minutes    02893 - PT Manual Therapy Minutes  28  -MW         Manual Rx 1    Manual Rx 1 Location  LT chest / mastectomy site   -MW      Manual Rx 1 Type  STM with tissue bending, lifting, rolling and space correction techniques at lateral pectoralis border and at incision lines   -MW      Manual Rx 1 Grade  gentle to very gentle   -MW      Manual Rx 1 Duration  15  -MW         Manual Rx 2    Manual Rx 2 Location  LT teres & subscap mm groups into posterior axilla   -MW      Manual Rx 2 Type  STM with tissue bending and trigger pt techniques   -MW      Manual Rx 2 Grade  gentle to moderate; deep breathing with trigger point release to subscap  -MW      Manual Rx 2 Duration  5  -MW        User Key  (r) = Recorded By, (t) = Taken By, (c) = Cosigned By    Initials Name Provider Type    Jennifer Solorio, PT Physical Therapist                             Time Calculation:   Start Time: 1335  Total Timed Code Minutes- PT: 28 minute(s)   Therapy Suggested Charges     Code   Minutes Charges    03642 (CPT®)  Pt Neuromusc Re Education Ea 15 Min      04048 (CPT®) Hc Pt Ther Proc Ea 15 Min      50783 (CPT®) Hc Gait Training Ea 15 Min      37036 (CPT®) Hc Pt Therapeutic Act Ea 15 Min      87103 (CPT®) Hc Pt Manual Therapy Ea 15 Min 28 2    56597 (CPT®) Hc Pt Ther Massage- Per 15 Min      13968 (CPT®)  " Pt Iontophoresis Ea 15 Min      03632 (CPT®) Hc Pt Elec Stim Ea-Per 15 Min      90886 (CPT®) Hc Pt Ultrasound Ea 15 Min      23386 (CPT®) Hc Pt Self Care/Mgmt/Train Ea 15 Min      66327 (CPT®) Hc Pt Prosthetic (S) Train Initial Encounter, Each 15 Min      57559 (CPT®) Hc Orthotic(S) Mgmt/Train Initial Encounter, Each 15min      48402 (CPT®) Hc Pt Aquatic Therapy Ea 15 Min      86742 (CPT®) Hc Pt Orthotic(S)/Prosthetic(S) Encounter, Each 15 Min       (CPT®) Hc Pt Electrical Stim Unattended      Total  28 2        Therapy Charges for Today     Code Description Service Date Service Provider Modifiers Qty    01765831188 HC PT MANUAL THERAPY EA 15 MIN 11/12/2018 Jennifer Noriega, PT GP 2                    Jennifer Noriega, PT  11/12/2018

## 2018-11-13 ENCOUNTER — HOSPITAL ENCOUNTER (OUTPATIENT)
Dept: RADIATION ONCOLOGY | Facility: HOSPITAL | Age: 48
Discharge: HOME | End: 2018-11-13

## 2018-11-13 ENCOUNTER — HOSPITAL ENCOUNTER (OUTPATIENT)
Dept: PHYSICAL THERAPY | Facility: HOSPITAL | Age: 48
Setting detail: THERAPIES SERIES
Discharge: HOME | End: 2018-11-13
Attending: SURGERY

## 2018-11-13 VITALS — BODY MASS INDEX: 27.5 KG/M2 | WEIGHT: 170.3 LBS

## 2018-11-13 DIAGNOSIS — I89.0 LYMPHEDEMA OF LEFT UPPER EXTREMITY: ICD-10-CM

## 2018-11-13 DIAGNOSIS — N64.4 MASTODYNIA OF LEFT BREAST: Primary | ICD-10-CM

## 2018-11-13 DIAGNOSIS — R68.89 IMPAIRED FUNCTION OF UPPER EXTREMITY: ICD-10-CM

## 2018-11-13 DIAGNOSIS — L90.5 SCAR CONDITIONS AND FIBROSIS OF SKIN: ICD-10-CM

## 2018-11-13 DIAGNOSIS — I89.0 LYMPHEDEMA OF RIGHT UPPER EXTREMITY: ICD-10-CM

## 2018-11-13 PROCEDURE — 77412 RADIATION TX DELIVERY LVL 3: CPT | Performed by: RADIOLOGY

## 2018-11-13 PROCEDURE — 97140 MANUAL THERAPY 1/> REGIONS: CPT | Performed by: PHYSICAL THERAPIST

## 2018-11-13 RX ORDER — PREDNISONE 20 MG/1
20 TABLET ORAL DAILY
Qty: 30 TABLET | Refills: 1 | Status: SHIPPED | OUTPATIENT
Start: 2018-11-13 | End: 2019-01-04 | Stop reason: SDUPTHER

## 2018-11-13 NOTE — THERAPY TREATMENT NOTE
"    Outpatient Physical Therapy Lymphedema Treatment Note  [facility]     Patient Name: Tia Sommers  : 1970  MRN: [id number]  Today's Date: 2018        Visit Date: 2018    Visit Dx:    ICD-10-CM ICD-9-CM   1. Mastodynia of left breast N64.4 611.71   2. Scar conditions and fibrosis of skin L90.5 709.2   3. Lymphedema of left upper extremity I89.0 457.1   4. Lymphedema of right upper extremity I89.0 457.1   5. Impaired function of upper extremity R68.89 V49.5       Patient Active Problem List   Diagnosis   • Malignant neoplasm of upper-inner quadrant of left breast in female, estrogen receptor positive (CMS/HCC)   • Epigastric pain   • Elevated LFTs   • Abnormal CT of the chest   • Malignant neoplasm of left breast in female, estrogen receptor positive (CMS/HCC)   • History of ITP   • Lupus        Lymphedema     Row Name 18 1345          Able to rate subjective pain?  yes  -MW    Pre-Treatment Pain Level  8  -MW    Post-Treatment Pain Level  7  -MW    Subjective Pain Comment  Lt chest   -MW          Subjective Comments  Pt reports Dr. Fowler contacted the rheumatologist and they decided to start a steroid to help with pain and inflammation during XRT; until she is able to resume other antinflammatory meds. Still sore Lt ribs and more sensative over Lt chest XRT area   -MW          Ptting Edema Category  By severity  -MW    Pitting Edema  Moderate  -MW    Edema Assessment Comment  Mild to mod bilat trunk; RUE softer and less full than yesterday; LUE mild   -MW          Location/Assessment  Upper Extremity;Upper Quadrant  -MW    Upper Extremity Conditions  bilateral:;intact;clean;left:;other (comment) LT multiple tatoos  -MW    Upper Extremity Color/Pigment  bilateral:;normal  -MW    Upper Quadrant Conditions  bilateral:;intact  -MW    Upper Quadrant Color/Pigment  right:;hyperpigmented;left:;other (comment) area of XRT \"tan\"; Lt XRT stickers and markings   -MW          Manual Lymphatic " Drainage  initial sequence;opened regional lymph nodes;opened anastamoses;extremity treatment;astym  -MW    Initial Sequence  supraclavicular;shoulder collectors  -MW    Supraclavicular  left;right  -MW    Shoulder Collectors  left;right  -MW    Abdomen  -- deferred due to fullness/ bloating   -MW    Opened Regional Lymph Nodes  inguinal  -MW    Inguinal  left;right  -MW    Opened Anastamoses  axillo-inguinal  -MW    Axillo-Inguinal  left;right  -MW    Extremity Treatment  MLD to proximal limb only  -MW    MLD to Proximal Limb Only  LUE  -MW    MLD to Full Limb  RUE   -MW    Astym  mastectomy protocol  -MW    Mastectomy Protocol  supine;sitting  -MW    Mastectomy Protocol Comment  Seated on stool with upper body resting on face craddle to focus on Rt & Lt upper back/ scapular area with evaluator and localizer; extra strokes to leveator scap, mid traps and teres mm groups. Continued strokes into lateral trunk adjacent to breast area. In supine: Evaluator and localizer to Rt and Lt chest, working around port on Lt and gently over XRT stickers and area of tenderness. Mild to moderate soft tissue disruptions Rt chest, mild Lt chest. Continued ASTYM into lateral trunk / ribs; mild soft tissue disruptions on Rt, Lt smooth. Repositioned in shoulder flex/ ABD to open axilla (partially able to achieve position bilat) and work on pectoralis tightness, soft tissue restrictions extra strokes bilat; very gentle over Lt.   -MW    Manual Lymphatic Drainage Comments  MLD mixed with STM post ASTYM working around trunk with position changes. Very gentle on LT   -MW          Compression/Skin Care  skin care;wrapping location;bandaging;compression garment  -    Skin Care  moisturizing lotion applied residual cocoa butter on trunk   -MW    Wrapping Location  upper extremity  -MW    Compression Garment Comments  assisted pt to don Bilat UE sleeves   -MW    Compression/Skin Care Comments  added compressogrip 3.5 to Rt forearm to  reinforce worn sleeve.   -      User Key  (r) = Recorded By, (t) = Taken By, (c) = Cosigned By    Initials Name Provider Type    Jennifer Solorio, PT Physical Therapist                        PT Assessment/Plan     Row Name 11/13/18 3085          PT Assessment    Functional Limitations  Performance in self-care ADL;Limitation in home management;Performance in work activities  -     Impairments  Edema;Impaired lymphatic circulation;Impaired postural alignment;Muscle strength;Pain;Range of motion;Sensation  -     Assessment Comments  Pt returns today for full tx to bilat upper quarters as yesterday was cut short due to delay at XRT. RUE swelling less full this visit, and further reduced post tx. Rt axilla able to work deeper into soft tissue restriction with pt seated on upper body resting on face craddle. Lt chest becoming progressively more tender as XRT progresses as expected. Cont 1 x wk next week   -MW     Rehab Potential  Fair  -MW     Patient/caregiver participated in establishment of treatment plan and goals  Yes  -MW     Patient would benefit from skilled therapy intervention  Yes  -MW        PT Plan    PT Frequency  1x/week  -     Physical Therapy Interventions (Optional Details)  manual therapy techniques;manual lymphatic drainage;postural re-education;ROM (Range of Motion);stretching;strengthening;home exercise program  -     PT Plan Comments  Cont ASTYM/STM, MLD as tolerated; progress Ther exer marina LT shoulder ROM/ flexibility as pain allows.   -       User Key  (r) = Recorded By, (t) = Taken By, (c) = Cosigned By    Initials Name Provider Type    Jennifer Solorio, PT Physical Therapist               Exercises     Row Name 11/13/18 2606          Subjective Comments    Subjective Comments  Pt reports Dr. Fowler contacted the rheumatologist and they decided to start a steroid to help with pain and inflammation during XRT; until she is able to resume other antinflammatory meds. Still  sore Lt ribs and more sensative over Lt chest XRT area   -MW        Subjective Pain    Able to rate subjective pain?  yes  -MW     Pre-Treatment Pain Level  8  -MW     Post-Treatment Pain Level  7  -MW     Subjective Pain Comment  Lt chest   -MW        Total Minutes    60170 - PT Manual Therapy Minutes  55         Exercise 1    Exercise Name 1  RT HBH wings   -MW     Cueing 1  Tactile  -MW     Reps 1  9  -MW     Additional Comments  concurrent with Manual therapy  -MW        Exercise 2    Exercise Name 2  LT HBH wings   -MW     Cueing 2  Tactile  -MW     Reps 2  6  -MW     Additional Comments  using palm rather than thumb in 3 locations   -MW       User Key  (r) = Recorded By, (t) = Taken By, (c) = Cosigned By    Initials Name Provider Type    MW Jennifer Noriega, PT Physical Therapist                       Manual Rx (last 36 hours)      Manual Treatments     Row Name 11/13/18 1500          Total Minutes    08001 - PT Manual Therapy Minutes  55  -MW        Manual Rx 1    Manual Rx 1 Location  Bilat chest / mastectomy sites   -MW     Manual Rx 1 Type  STM with tissue bending, lifting, rolling and space correction techniques at lateral pectoralis border and at incision lines   -MW     Manual Rx 1 Grade  mod Rt, gentle Lt   -MW     Manual Rx 1 Duration  15  -MW        Manual Rx 2    Manual Rx 2 Location  RT >LT upper trap, mid trap, leveator scap and teres mm groups wrapping around into posterior axilla   -MW     Manual Rx 2 Type  STM with tissue bending and lifting techniques   -MW     Manual Rx 2 Grade  gentle to moderate   -MW     Manual Rx 2 Duration  20  -MW       User Key  (r) = Recorded By, (t) = Taken By, (c) = Cosigned By    Initials Name Provider Type    Jennifer Solorio, PT Physical Therapist                             Time Calculation:   Start Time: 1345  Total Timed Code Minutes- PT: 55 minute(s)   Therapy Suggested Charges     Code   Minutes Charges    06570 (CPT®)  Pt Neuromusc Re Education Ea 15  Min      24073 (CPT®) Hc Pt Ther Proc Ea 15 Min      83864 (CPT®) Hc Gait Training Ea 15 Min      53881 (CPT®) Hc Pt Therapeutic Act Ea 15 Min      16605 (CPT®) Hc Pt Manual Therapy Ea 15 Min 55 4    83146 (CPT®) Hc Pt Ther Massage- Per 15 Min      99162 (CPT®) Hc Pt Iontophoresis Ea 15 Min      61572 (CPT®) Hc Pt Elec Stim Ea-Per 15 Min      75411 (CPT®) Hc Pt Ultrasound Ea 15 Min      57273 (CPT®) Hc Pt Self Care/Mgmt/Train Ea 15 Min      45921 (CPT®) Hc Pt Prosthetic (S) Train Initial Encounter, Each 15 Min      94255 (CPT®) Hc Orthotic(S) Mgmt/Train Initial Encounter, Each 15min      58755 (CPT®) Hc Pt Aquatic Therapy Ea 15 Min      50378 (CPT®) Hc Pt Orthotic(S)/Prosthetic(S) Encounter, Each 15 Min       (CPT®) Hc Pt Electrical Stim Unattended      Total  55 4        Therapy Charges for Today     Code Description Service Date Service Provider Modifiers Qty    78683245230 HC PT MANUAL THERAPY EA 15 MIN 11/13/2018 Jennifer Noriega, PT GP 4                    Jennifer Noriega, PT  11/13/2018

## 2018-11-14 ENCOUNTER — HOSPITAL ENCOUNTER (OUTPATIENT)
Dept: RADIATION ONCOLOGY | Facility: HOSPITAL | Age: 48
Discharge: HOME | End: 2018-11-14

## 2018-11-14 PROCEDURE — 77412 RADIATION TX DELIVERY LVL 3: CPT | Performed by: RADIOLOGY

## 2018-11-15 ENCOUNTER — HOSPITAL ENCOUNTER (OUTPATIENT)
Dept: RADIATION ONCOLOGY | Facility: HOSPITAL | Age: 48
Discharge: HOME | End: 2018-11-15

## 2018-11-15 PROCEDURE — 77300 RADIATION THERAPY DOSE PLAN: CPT | Performed by: RADIOLOGY

## 2018-11-15 PROCEDURE — 77412 RADIATION TX DELIVERY LVL 3: CPT | Performed by: RADIOLOGY

## 2018-11-15 PROCEDURE — 77387 GUIDANCE FOR RADJ TX DLVR: CPT | Performed by: RADIOLOGY

## 2018-11-16 ENCOUNTER — HOSPITAL ENCOUNTER (OUTPATIENT)
Dept: RADIATION ONCOLOGY | Facility: HOSPITAL | Age: 48
Discharge: HOME | End: 2018-11-16

## 2018-11-16 ENCOUNTER — OFFICE VISIT (OUTPATIENT)
Dept: ONCOLOGY | Facility: CLINIC | Age: 48
End: 2018-11-16

## 2018-11-16 ENCOUNTER — INFUSION (OUTPATIENT)
Dept: ONCOLOGY | Facility: HOSPITAL | Age: 48
End: 2018-11-16

## 2018-11-16 VITALS
RESPIRATION RATE: 16 BRPM | DIASTOLIC BLOOD PRESSURE: 107 MMHG | BODY MASS INDEX: 27.64 KG/M2 | HEART RATE: 85 BPM | OXYGEN SATURATION: 99 % | WEIGHT: 172 LBS | SYSTOLIC BLOOD PRESSURE: 178 MMHG | HEIGHT: 66 IN | TEMPERATURE: 98.3 F

## 2018-11-16 DIAGNOSIS — C50.212 MALIGNANT NEOPLASM OF UPPER-INNER QUADRANT OF LEFT BREAST IN FEMALE, ESTROGEN RECEPTOR POSITIVE (HCC): ICD-10-CM

## 2018-11-16 DIAGNOSIS — Z17.0 MALIGNANT NEOPLASM OF UPPER-INNER QUADRANT OF LEFT BREAST IN FEMALE, ESTROGEN RECEPTOR POSITIVE (HCC): ICD-10-CM

## 2018-11-16 DIAGNOSIS — C50.212 MALIGNANT NEOPLASM OF UPPER-INNER QUADRANT OF LEFT BREAST IN FEMALE, ESTROGEN RECEPTOR POSITIVE (HCC): Primary | ICD-10-CM

## 2018-11-16 DIAGNOSIS — Z17.0 MALIGNANT NEOPLASM OF UPPER-INNER QUADRANT OF LEFT BREAST IN FEMALE, ESTROGEN RECEPTOR POSITIVE (HCC): Primary | ICD-10-CM

## 2018-11-16 PROCEDURE — 99213 OFFICE O/P EST LOW 20 MIN: CPT | Performed by: NURSE PRACTITIONER

## 2018-11-16 PROCEDURE — 77412 RADIATION TX DELIVERY LVL 3: CPT | Performed by: RADIOLOGY

## 2018-11-16 PROCEDURE — 77387 GUIDANCE FOR RADJ TX DLVR: CPT | Performed by: RADIOLOGY

## 2018-11-16 PROCEDURE — 77336 RADIATION PHYSICS CONSULT: CPT | Performed by: RADIOLOGY

## 2018-11-16 PROCEDURE — 96523 IRRIG DRUG DELIVERY DEVICE: CPT

## 2018-11-16 RX ORDER — SODIUM CHLORIDE 0.9 % (FLUSH) 0.9 %
10 SYRINGE (ML) INJECTION AS NEEDED
Status: DISCONTINUED | OUTPATIENT
Start: 2018-11-16 | End: 2018-11-16 | Stop reason: HOSPADM

## 2018-11-16 RX ORDER — SODIUM CHLORIDE 0.9 % (FLUSH) 0.9 %
10 SYRINGE (ML) INJECTION AS NEEDED
Status: CANCELLED | OUTPATIENT
Start: 2018-11-16

## 2018-11-16 RX ADMIN — Medication 10 ML: at 11:53

## 2018-11-16 RX ADMIN — HEPARIN 500 UNITS: 100 SYRINGE at 11:53

## 2018-11-16 NOTE — PROGRESS NOTES
CHIEF COMPLAINT:   1.  Follow-up for breast cancer during adjuvant therapy                                             Problem List:  Oncology/Hematology History    1.  Stage IIA left breast cancer  2. History of right breast cancer 1998, data deficient  3. Lupus  4.  ITP: History of refractory ITP, failed splenectomy, steroids, IVIG and Decadron.  Last received Rituxan August 2013        Malignant neoplasm of upper-inner quadrant of left breast in female, estrogen receptor positive (CMS/HCC)    5/16/2018 Initial Diagnosis     1.) Stage IIa [id number] Breast cancer, left: Had bilateral mastectomy on 5/16/18 with benign disease on the right.  The left breast had infiltrating lobular carcinoma, Bloom Gutierrez 6 out of 9, 2 foci largest being 4 cm, 4 total nodes and 2 sentinel nodes taken all negative.  There was a positive superficial margin.  Biopsy on 3/28/18 of abnormality found on screening mammogram was 95% 3+ positive ER and 95% 3+ positive VT and HER-2/taiwo 0+.  Baseline liver enzymes abnormal ALT 79 AST 55.    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  CHEK2 also causes an increased risk for colon cancer (up to 9.5% lifetime risk).           6/18/2018 -  Other Event     Oncotype score 28 with 18% 10 year risk of distant recurrence on tamoxifen alone.  There is about a 6% lower risk of distant recurrence when chemotherapy is added to the hormonal blockade per this prediction.  The Oncotype showed her to be ER positive and VT negative and HER-2/taiwo negative.         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  Patient was started on Valtrex after  presenting to the emergency room on 7/11/2018, subsequently placed her on Famvir prophylaxis           8/14/2018 Imaging     Bilateral Lower Extremity Venous Doppler study negative for DVT.         8/15/2018 Imaging     PET/CT IMPRESSION:  1. Shotty nodes in the anterior mediastinal fat, weakly hypermetabolic,  and of only questionable significance. Imaging follow-up is suggested to  evaluate for stability.  2. Expected activity at the patient's left-sided port injection site.  Otherwise negative PET scan.         9/10/2018 Procedure     EGD and colonoscopy         11/7/2018 Imaging     CT chest IMPRESSION:  Negative CT scan of the chest. There are no acute findings.  The tiny nodules in the anterior mediastinum are entirely stable and are  not thought to be clinically significant.            HISTORY OF PRESENT ILLNESS:  The patient is a 48 y.o. female, here for follow up on management of adjuvant therapy of breast cancer.  Tia is doing well with no new concerns.  She is undergoing radiation and should be finished with that around the end of this month.  She is tolerating radiation without any difficulty.  She is here today to go over the results of her CT scan.  Has intermittent upper abdominal/midepigastric pain but this is much less than it was previously.  Appetite is normal.  No change in her bowel or bladder habits.  Has had negative EGD and colonoscopy, states that she has one more follow-up with gastroenterology.  She is now seeing rheumatology in regards to her lupus and states that she is on prednisone and this seems to be helping.  Tolerating Lupron and Arimidex.    Past Medical History:   Diagnosis Date   • Arthritis     minna knees and hands   • Breast cancer (CMS/HCC) 2000    dx in 1998;  2018   • Drug therapy 2000   • Hx of radiation therapy 2000   • Lupus 2014   • Lymphedema     right arm     Past Surgical History:   Procedure Laterality Date   • BONE BIOPSY      back   • BREAST BIOPSY Right    •  "BREAST LUMPECTOMY Right 2000   • COLONOSCOPY      > 10 years   • DIAGNOSTIC LAPAROSCOPY EXPLORATORY LAPAROTOMY     • ENDOSCOPY     • SPLENECTOMY     • VENOUS ACCESS DEVICE (PORT) INSERTION     • VENOUS ACCESS DEVICE (PORT) REMOVAL  2003       Allergies   Allergen Reactions   • Iodine Anaphylaxis   • Penicillins Anaphylaxis   • Latex Rash       Family History and Social History reviewed and changed as necessary      REVIEW OF SYSTEM:   Review of Systems   Constitutional: Negative for appetite change, chills, diaphoresis, fatigue, fever and unexpected weight change.   HENT:   Negative for mouth sores, sore throat and trouble swallowing.    Eyes: Negative for icterus.   Respiratory: Negative for cough, hemoptysis and shortness of breath.    Cardiovascular: Negative for chest pain, leg swelling and palpitations.   Gastrointestinal: Negative for abdominal distention, blood in stool, constipation, diarrhea, nausea and vomiting.  positive for intermittent abdominal/midepigastric pain.  Endocrine: Negative for hot flashes.   Genitourinary: Negative for bladder incontinence, difficulty urinating, dysuria, frequency and hematuria.    Musculoskeletal: Negative for gait problem, neck pain and neck stiffness.  positive for joint pain and stiffness particularly in her knees.  Skin: Negative for rash.   Neurological: Negative for dizziness, gait problem, headaches, light-headedness and numbness.   Hematological: Negative for adenopathy. Does not bruise/bleed easily.   Psychiatric/Behavioral: Negative for depression. The patient is not nervous/anxious.    All other systems reviewed and are negative.       PHYSICAL EXAM    Vitals:    11/16/18 1037   BP: (!) 178/107   Pulse: 85   Resp: 16   Temp: 98.3 °F (36.8 °C)   SpO2: 99%   Weight: 78 kg (172 lb)   Height: 167.6 cm (66\")     Constitutional: Appears well-developed and well-nourished. No distress.   ECOG: (0) Fully active, able to carry on all predisease performance without " restriction  HENT:   Head: Normocephalic.   Mouth/Throat: Oropharynx is clear and moist.   Eyes: Conjunctivae are normal. Pupils are equal, round, and reactive to light. No scleral icterus.   Neck: Neck supple. No JVD present. No thyromegaly present.   Cardiovascular: Normal rate, regular rhythm and normal heart sounds.    Pulmonary/Chest: Breath sounds normal. No respiratory distress.   Abdominal: Soft. Exhibits no distension and no mass. There is no hepatosplenomegaly. There is no tenderness. There is no rebound and no guarding.   Musculoskeletal:Exhibits no edema, tenderness or deformity.   Neurological: Alert and oriented to person, place, and time. Exhibits normal muscle tone.   Skin: No ecchymosis, no petechiae and no rash noted. Not diaphoretic. No cyanosis. Nails show no clubbing.   Psychiatric: Normal mood and affect.   Vitals reviewed.  Laboratory data reviewed.      ASSESSMENT & PLAN:    1. Stage II hormone receptor positive breast cancer  2. Transient Liver enzyme elevations  3. History of lupus  4. CHEK2 gene mutation    Discussion: CT of the abdomen was negative.  No plans on any further scans in the future in the absence of symptoms.  Had an EGD and colonoscopy on 9/10/2018, showed esophagitis and 2 benign polyps removed on colonoscopy.  She continues to have intermittent abdominal pain but seems to be improving with time.  Her liver enzymes have transient mild elevation.   She will complete radiation at the end of this month.  She is tolerating Arimidex and Lupron, next Lupron injection due in January.  She will need periodic bone density testing.  She is now back with Rheumatology regarding her lupus.  She had an EGD and colonoscopy in September with benign polyps removed, she will need to continue close follow-up, she has Chek 2 gene mutation per genetic testing which puts her at increased risk of colon cancer.   I will see the patient back for follow-up in April.    I spent 25 minutes with the  patient. I spent > 50% percent of this time counseling and discussing prognosis, diagnostic testing, evaluation, current status and management.  Nuvia Andrews, APRN    11/16/2018

## 2018-11-19 ENCOUNTER — HOSPITAL ENCOUNTER (OUTPATIENT)
Dept: RADIATION ONCOLOGY | Facility: HOSPITAL | Age: 48
Discharge: HOME | End: 2018-11-19

## 2018-11-19 ENCOUNTER — HOSPITAL ENCOUNTER (OUTPATIENT)
Dept: PHYSICAL THERAPY | Facility: HOSPITAL | Age: 48
Setting detail: THERAPIES SERIES
Discharge: HOME | End: 2018-11-19
Attending: SURGERY

## 2018-11-19 DIAGNOSIS — I89.0 LYMPHEDEMA OF RIGHT UPPER EXTREMITY: ICD-10-CM

## 2018-11-19 DIAGNOSIS — R68.89 IMPAIRED FUNCTION OF UPPER EXTREMITY: ICD-10-CM

## 2018-11-19 DIAGNOSIS — L90.5 SCAR CONDITIONS AND FIBROSIS OF SKIN: ICD-10-CM

## 2018-11-19 DIAGNOSIS — I97.2 POST-MASTECTOMY LYMPHEDEMA SYNDROME: ICD-10-CM

## 2018-11-19 DIAGNOSIS — I89.0 LYMPHEDEMA OF LEFT UPPER EXTREMITY: ICD-10-CM

## 2018-11-19 DIAGNOSIS — N64.4 MASTODYNIA OF LEFT BREAST: Primary | ICD-10-CM

## 2018-11-19 PROCEDURE — 77412 RADIATION TX DELIVERY LVL 3: CPT | Performed by: RADIOLOGY

## 2018-11-19 PROCEDURE — 97140 MANUAL THERAPY 1/> REGIONS: CPT | Performed by: PHYSICAL THERAPIST

## 2018-11-19 NOTE — THERAPY PROGRESS REPORT/RE-CERT
Outpatient Physical Therapy Lymphedema Progress Note  [facility]     Patient Name: Tia Sommers  : 1970  MRN: [id number]  Today's Date: 2018        Visit Date: 2018    Visit Dx:    ICD-10-CM ICD-9-CM   1. Mastodynia of left breast N64.4 611.71   2. Scar conditions and fibrosis of skin L90.5 709.2   3. Lymphedema of left upper extremity I89.0 457.1   4. Lymphedema of right upper extremity I89.0 457.1   5. Impaired function of upper extremity R68.89 V49.5   6. Post-mastectomy lymphedema syndrome I97.2 457.0       Patient Active Problem List   Diagnosis   • Malignant neoplasm of upper-inner quadrant of left breast in female, estrogen receptor positive (CMS/HCC)   • Epigastric pain   • Elevated LFTs   • Abnormal CT of the chest   • Malignant neoplasm of left breast in female, estrogen receptor positive (CMS/HCC)   • History of ITP   • Lupus        Lymphedema     Row Name 18 1300          Able to rate subjective pain?  yes  -MW    Pre-Treatment Pain Level  5  -MW    Post-Treatment Pain Level  4  -MW    Subjective Pain Comment  Lt chest; knees more painful than chest   -MW          Subjective Comments  Pt reports overall feeling better; steroids seem to be helping.   -MW          Ptting Edema Category  By severity  -MW    Pitting Edema  Moderate  -MW    Edema Assessment Comment  Mild to mod bilat trunk; RUE soft with mild to moderate edema; LUE mild   -MW          Location/Assessment  Upper Extremity;Upper Quadrant  -MW    Upper Extremity Conditions  bilateral:;intact;clean;left:;other (comment) LT multiple tatoos  -MW    Upper Extremity Color/Pigment  bilateral:;normal  -MW    Upper Quadrant Conditions  bilateral:;intact  -MW    Upper Quadrant Color/Pigment  right:;hyperpigmented;left:;erythema Left new XRT erythema in axilla   -MW    Upper Quadrant Skin Details  LT lower ribs also with faint erythema but no c/o pain. Multiple stickers / markings for XRT.   -MW          Lymphedema  Sensation Comments  bilat areas of tenderness ant-lateral aspect of chest wall.   -MW          Manual Lymphatic Drainage  initial sequence;opened regional lymph nodes;opened anastamoses;extremity treatment;astym  -    Initial Sequence  supraclavicular;shoulder collectors  -MW    Supraclavicular  left;right  -MW    Shoulder Collectors  left;right  -MW    Abdomen  -- deferred due to fullness/ bloating   -MW    Opened Regional Lymph Nodes  inguinal  -MW    Inguinal  left;right  -MW    Opened Anastamoses  axillo-inguinal  -MW    Axillo-Inguinal  left;right  -MW    Extremity Treatment  MLD to proximal limb only  -MW    Simple/Brief MLD  RUE   -MW    MLD to Proximal Limb Only  LUE  -MW    Astym  mastectomy protocol  -    Mastectomy Protocol  supine;sitting  -    Mastectomy Protocol Comment  Seated on stool with upper body resting on face craddle to focus on Rt & Lt upper back/ scapular area with evaluator and localizer; extra strokes to leveator scap, mid traps and teres mm groups. Continued strokes into lateral trunk adjacent to breast area. In supine: Evaluator and localizer to Rt and Lt chest, working around port on Lt and gently over XRT stickers and area of tenderness; avoided axillary erythema. Mild to moderate soft tissue disruptions Rt chest, mild Lt chest. Continued ASTYM into lateral trunk / ribs; mild soft tissue disruptions on Rt and Lt. Repositioned in shoulder flex/ ABD to open axilla (partially able to achieve position bilat) and work on pectoralis tightness, soft tissue restrictions extra strokes bilat; very gentle over Lt.   -MW    Manual Lymphatic Drainage Comments  MLD mixed with STM post ASTYM working around trunk with position changes. Very gentle on LT   -MW          Compression/Skin Care  skin care;wrapping location;bandaging;compression garment  -    Skin Care  moisturizing lotion applied residual cocoa butter on trunk   -MW    Wrapping Location  upper extremity  -MW    Compression Garment  Comments  pt to don sleeves at home; forgot them today   -MW      User Key  (r) = Recorded By, (t) = Taken By, (c) = Cosigned By    Initials Name Provider Type    Jennifer Solorio, PT Physical Therapist            PT Ortho     Row Name 11/19/18 1300       Right Upper Ext    Rt Shoulder Flexion AROM  0-140; seated AA with LUE   -MW       Left Upper Ext    Lt Shoulder Flexion AROM  0-150; seated with bilat AA but Lt able to progress farther   -MW      User Key  (r) = Recorded By, (t) = Taken By, (c) = Cosigned By    Initials Name Provider Type    Jennifer Solorio, PT Physical Therapist                  PT Assessment/Plan     Row Name 11/19/18 1300          PT Assessment    Functional Limitations  Performance in self-care ADL;Limitation in home management;Performance in work activities  -MW     Impairments  Edema;Impaired lymphatic circulation;Impaired postural alignment;Muscle strength;Pain;Range of motion;Sensation  -MW     Assessment Comments  Lt chest skin changes progressing as XRT progressing; mild erythema in axilla with increased tenderness so avoided manual therapy to this area. Noted area of fullness Rt chest superior to lateral aspect of incision line scar; may need follow up by MD if no change or enlarges. Pt with stable Bilat UE lymphedema with RUE greater than Lt; Lt remains very mild. Pt progressing toward remaining goals; may become more limited on Lt as XRT progresses, but expect to regain mobility and flexibility post XRT with continued PT.   -MW     Rehab Potential  Fair  -MW     Patient/caregiver participated in establishment of treatment plan and goals  Yes  -MW     Patient would benefit from skilled therapy intervention  Yes  -MW        PT Plan    PT Frequency  1x/week  -MW     Predicted Duration of Therapy Intervention (Therapy Eval)  12 visits   -MW     Planned CPT's?  PT MANUAL THERAPY EA 15 MIN: 77209;PT THER PROC EA 15 MIN: 91622  -MW     Physical Therapy Interventions (Optional  Details)  manual therapy techniques;manual lymphatic drainage;postural re-education;ROM (Range of Motion);stretching;strengthening;home exercise program  -MW     PT Plan Comments  Cont ASTYM/STM, MLD as tolerated monitor LT upper quarter skin changes with XRT; progress Ther exer for shoulder ROM/ flexibility as pain allows.   -MW       User Key  (r) = Recorded By, (t) = Taken By, (c) = Cosigned By    Initials Name Provider Type    MW Jennifer Noriega, PT Physical Therapist               Exercises     Row Name 11/19/18 1300             Subjective Comments    Subjective Comments  Pt reports overall feeling better; steroids seem to be helping.   -MW         Subjective Pain    Able to rate subjective pain?  yes  -MW      Pre-Treatment Pain Level  5  -MW      Post-Treatment Pain Level  4  -MW      Subjective Pain Comment  Lt chest; knees more painful than chest   -MW         Total Minutes    55742 - PT Therapeutic Exercise Minutes  5  -MW      52300 - PT Manual Therapy Minutes  55  -MW         Exercise 1    Exercise Name 1  RT HBH wings   -MW      Cueing 1  Tactile  -MW      Reps 1  9  -MW      Additional Comments  concurrent with Manual therapy  -MW         Exercise 2    Exercise Name 2  LT HBH wings   -MW      Cueing 2  Tactile  -MW      Reps 2  6  -MW      Additional Comments  concurrent with Manual therapy  -MW         Exercise 3    Exercise Name 3  HBH chicken wings with sidebending   -MW      Cueing 3  Demo;Verbal  -MW      Reps 3  2 each way   -MW      Additional Comments  deep breath end range last rep   -MW        User Key  (r) = Recorded By, (t) = Taken By, (c) = Cosigned By    Initials Name Provider Type    Jennifer Solorio, PT Physical Therapist                       Manual Rx (last 36 hours)      Manual Treatments     Row Name 11/19/18 1300             Total Minutes    62567 - PT Manual Therapy Minutes  55  -MW         Manual Rx 1    Manual Rx 1 Location  Bilat chest / mastectomy sites   -MW      Manual  Rx 1 Type  STM with tissue bending, lifting, rolling and space correction techniques at lateral pectoralis border and at incision lines   -MW      Manual Rx 1 Grade  mod Rt, gentle Lt   -MW      Manual Rx 1 Duration  15  -MW         Manual Rx 2    Manual Rx 2 Location  RT >LT upper trap, mid trap, leveator scap and teres mm groups wrapping around into posterior axilla   -MW      Manual Rx 2 Type  STM with tissue bending and lifting techniques   -MW      Manual Rx 2 Grade  gentle to moderate   -MW      Manual Rx 2 Duration  20  -MW        User Key  (r) = Recorded By, (t) = Taken By, (c) = Cosigned By    Initials Name Provider Type    MW Jennifer Noriega, PT Physical Therapist          PT OP Goals     Row Name 11/19/18 1300          STG 1  Pt independent with basic HEP for shoulder ROM and posture.   -MW    STG 1 Progress  Met  -MW    STG 2  RUE lymphedema to decrease at least 10%.   -MW    STG 2 Progress  Partially Met;Progressing  -MW    STG 3  LT shoulder flexibility to improve at least 15 degrees to improve self care and prepare for positioning for XRT.   -MW    STG 3 Progress  Met  -MW    STG 4  Pt to report decreased pain ratings on DASH to 3/5 or less.   -MW    STG 4 Progress  Met  -MW          LTG 1  Pt independent with advanced HEP for posture, flexibility and strengthening to promote safe self care.   -MW    LTG 1 Progress  Ongoing;Progressing  -MW    LTG 2  Pt to demonstrate improved function with at least 20% improvement on DASH score.   -MW    LTG 2 Progress  Met  -MW    LTG 3  Bilat shoulder flexion to be greater than 160 deg, for pt to be able to achieve positioning for XRT.   -MW    LTG 3 Progress  Ongoing;Progressing  -MW    LTG 3 Progress Comments  Lt shoulder stable, but currently receiving XRT.   -MW    LTG 4  Pt measured for LUE arm sleeve; check fit   -MW    LTG 4 Progress  Met  -MW    LTG 5  Pt to be independent with self lymphedema care; including use of compression pump, compression sleeves/  gloves and skin care.   -MW    LTG 5 Progress  Partially Met;Progressing  -MW          PT Goal Re-Cert Due Date  12/20/18  -MW      User Key  (r) = Recorded By, (t) = Taken By, (c) = Cosigned By    Initials Name Provider Type    Jennifer Solorio, PT Physical Therapist          Therapy Education  Education Details: Added HBH chicken wings with SBing; cont self care, compression, rest prn.   Given: HEP, Edema management, Symptoms/condition management  Program: Progressed, Reinforced  How Provided: Verbal, Written  Provided to: Patient  Level of Understanding: Verbalized, Teach back education performed, Demonstrated              Time Calculation:   Start Time: 1300  Total Timed Code Minutes- PT: 60 minute(s)   Therapy Suggested Charges     Code   Minutes Charges    93791 (CPT®) Hc Pt Neuromusc Re Education Ea 15 Min      25591 (CPT®) Hc Pt Ther Proc Ea 15 Min 5     20467 (CPT®) Hc Gait Training Ea 15 Min      83329 (CPT®) Hc Pt Therapeutic Act Ea 15 Min      74372 (CPT®) Hc Pt Manual Therapy Ea 15 Min 55 4    44858 (CPT®) Hc Pt Ther Massage- Per 15 Min      29068 (CPT®) Hc Pt Iontophoresis Ea 15 Min      08495 (CPT®) Hc Pt Elec Stim Ea-Per 15 Min      18689 (CPT®) Hc Pt Ultrasound Ea 15 Min      10762 (CPT®) Hc Pt Self Care/Mgmt/Train Ea 15 Min      96291 (CPT®) Hc Pt Prosthetic (S) Train Initial Encounter, Each 15 Min      60388 (CPT®) Hc Orthotic(S) Mgmt/Train Initial Encounter, Each 15min      02759 (CPT®) Hc Pt Aquatic Therapy Ea 15 Min      38801 (CPT®) Hc Pt Orthotic(S)/Prosthetic(S) Encounter, Each 15 Min       (CPT®) Hc Pt Electrical Stim Unattended      Total  60 4        Therapy Charges for Today     Code Description Service Date Service Provider Modifiers Qty    98054871101 HC PT MANUAL THERAPY EA 15 MIN 11/19/2018 Jennifer Noriega, PT GP 4                    Jennifer Noriega, PT  11/19/2018

## 2018-11-20 ENCOUNTER — HOSPITAL ENCOUNTER (OUTPATIENT)
Dept: RADIATION ONCOLOGY | Facility: HOSPITAL | Age: 48
Discharge: HOME | End: 2018-11-20

## 2018-11-20 ENCOUNTER — DOCUMENTATION (OUTPATIENT)
Dept: NUTRITION | Facility: HOSPITAL | Age: 48
End: 2018-11-20

## 2018-11-20 ENCOUNTER — TELEPHONE (OUTPATIENT)
Dept: GASTROENTEROLOGY | Facility: CLINIC | Age: 48
End: 2018-11-20

## 2018-11-20 VITALS — BODY MASS INDEX: 26.76 KG/M2 | WEIGHT: 165.8 LBS

## 2018-11-20 PROCEDURE — 77412 RADIATION TX DELIVERY LVL 3: CPT | Performed by: RADIOLOGY

## 2018-11-20 NOTE — TELEPHONE ENCOUNTER
Patient left voicemail wanting results. She did not understand the letter that we mailed.    I read letter and explained letter to patient. Patient is aware that Nexium is at pharmacy.

## 2018-11-21 ENCOUNTER — HOSPITAL ENCOUNTER (OUTPATIENT)
Dept: RADIATION ONCOLOGY | Facility: HOSPITAL | Age: 48
Discharge: HOME | End: 2018-11-21

## 2018-11-21 PROCEDURE — 77412 RADIATION TX DELIVERY LVL 3: CPT | Performed by: RADIOLOGY

## 2018-11-21 NOTE — PROGRESS NOTES
[facility] Nutrition    Patient very emotional today, as she shared that financial issues are mounding.  She stated scarcity of money to buy food to eat is a real issue; she was very grateful for the outreach that has been done to this point including [employer] cards and the Thanksgiving box of food that was donated by a local [employer].  Patient was provided with samples of nutritional supplements, Boost Plus and Ensure to help supplement oral intake.   Will continue to follow as needed.

## 2018-11-23 ENCOUNTER — HOSPITAL ENCOUNTER (OUTPATIENT)
Dept: RADIATION ONCOLOGY | Facility: HOSPITAL | Age: 48
Discharge: HOME | End: 2018-11-23

## 2018-11-23 PROCEDURE — 77412 RADIATION TX DELIVERY LVL 3: CPT | Performed by: RADIOLOGY

## 2018-11-23 PROCEDURE — 77336 RADIATION PHYSICS CONSULT: CPT | Performed by: RADIOLOGY

## 2018-11-26 ENCOUNTER — HOSPITAL ENCOUNTER (OUTPATIENT)
Dept: RADIATION ONCOLOGY | Facility: HOSPITAL | Age: 48
Discharge: HOME | End: 2018-11-26

## 2018-11-26 ENCOUNTER — HOSPITAL ENCOUNTER (OUTPATIENT)
Dept: PHYSICAL THERAPY | Facility: HOSPITAL | Age: 48
Setting detail: THERAPIES SERIES
Discharge: HOME | End: 2018-11-26
Attending: SURGERY

## 2018-11-26 DIAGNOSIS — I89.0 LYMPHEDEMA OF RIGHT UPPER EXTREMITY: ICD-10-CM

## 2018-11-26 DIAGNOSIS — N64.4 MASTODYNIA OF LEFT BREAST: Primary | ICD-10-CM

## 2018-11-26 DIAGNOSIS — L90.5 SCAR CONDITIONS AND FIBROSIS OF SKIN: ICD-10-CM

## 2018-11-26 DIAGNOSIS — R68.89 IMPAIRED FUNCTION OF UPPER EXTREMITY: ICD-10-CM

## 2018-11-26 DIAGNOSIS — I89.0 LYMPHEDEMA OF LEFT UPPER EXTREMITY: ICD-10-CM

## 2018-11-26 DIAGNOSIS — I97.2 POST-MASTECTOMY LYMPHEDEMA SYNDROME: ICD-10-CM

## 2018-11-26 PROCEDURE — 77412 RADIATION TX DELIVERY LVL 3: CPT | Performed by: RADIOLOGY

## 2018-11-26 PROCEDURE — 97140 MANUAL THERAPY 1/> REGIONS: CPT | Performed by: PHYSICAL THERAPIST

## 2018-11-27 ENCOUNTER — HOSPITAL ENCOUNTER (OUTPATIENT)
Dept: RADIATION ONCOLOGY | Facility: HOSPITAL | Age: 48
Discharge: HOME | End: 2018-11-27

## 2018-11-27 VITALS — WEIGHT: 168.2 LBS | BODY MASS INDEX: 27.15 KG/M2

## 2018-11-27 PROCEDURE — 77412 RADIATION TX DELIVERY LVL 3: CPT | Performed by: RADIOLOGY

## 2018-11-28 ENCOUNTER — HOSPITAL ENCOUNTER (OUTPATIENT)
Dept: RADIATION ONCOLOGY | Facility: HOSPITAL | Age: 48
Discharge: HOME | End: 2018-11-28

## 2018-11-28 PROCEDURE — 77412 RADIATION TX DELIVERY LVL 3: CPT | Performed by: RADIOLOGY

## 2018-11-29 ENCOUNTER — HOSPITAL ENCOUNTER (OUTPATIENT)
Dept: RADIATION ONCOLOGY | Facility: HOSPITAL | Age: 48
Discharge: HOME | End: 2018-11-29

## 2018-11-29 PROCEDURE — 77336 RADIATION PHYSICS CONSULT: CPT | Performed by: RADIOLOGY

## 2018-11-29 PROCEDURE — 77412 RADIATION TX DELIVERY LVL 3: CPT | Performed by: RADIOLOGY

## 2018-11-30 ENCOUNTER — HOSPITAL ENCOUNTER (OUTPATIENT)
Dept: RADIATION ONCOLOGY | Facility: HOSPITAL | Age: 48
Discharge: HOME | End: 2018-11-30

## 2018-11-30 PROCEDURE — 77412 RADIATION TX DELIVERY LVL 3: CPT | Performed by: RADIOLOGY

## 2018-12-03 ENCOUNTER — HOSPITAL ENCOUNTER (OUTPATIENT)
Dept: PHYSICAL THERAPY | Facility: HOSPITAL | Age: 48
Setting detail: THERAPIES SERIES
Discharge: HOME | End: 2018-12-03
Attending: SURGERY

## 2018-12-03 DIAGNOSIS — N64.4 MASTODYNIA OF LEFT BREAST: Primary | ICD-10-CM

## 2018-12-03 DIAGNOSIS — R68.89 IMPAIRED FUNCTION OF UPPER EXTREMITY: ICD-10-CM

## 2018-12-03 DIAGNOSIS — L90.5 SCAR CONDITIONS AND FIBROSIS OF SKIN: ICD-10-CM

## 2018-12-03 DIAGNOSIS — I89.0 LYMPHEDEMA OF RIGHT UPPER EXTREMITY: ICD-10-CM

## 2018-12-03 DIAGNOSIS — I89.0 LYMPHEDEMA OF LEFT UPPER EXTREMITY: ICD-10-CM

## 2018-12-03 PROCEDURE — 97140 MANUAL THERAPY 1/> REGIONS: CPT | Performed by: PHYSICAL THERAPIST

## 2018-12-03 NOTE — THERAPY TREATMENT NOTE
"    Outpatient Physical Therapy Lymphedema Treatment Note  [facility]     Patient Name: Tia Sommers  : 1970  MRN: [id number]  Today's Date: 12/3/2018        Visit Date: 2018    Visit Dx:    ICD-10-CM ICD-9-CM   1. Mastodynia of left breast N64.4 611.71   2. Scar conditions and fibrosis of skin L90.5 709.2   3. Lymphedema of left upper extremity I89.0 457.1   4. Lymphedema of right upper extremity I89.0 457.1   5. Impaired function of upper extremity R68.89 V49.5       Patient Active Problem List   Diagnosis   • Malignant neoplasm of upper-inner quadrant of left breast in female, estrogen receptor positive (CMS/HCC)   • Epigastric pain   • Elevated LFTs   • Abnormal CT of the chest   • Malignant neoplasm of left breast in female, estrogen receptor positive (CMS/HCC)   • History of ITP   • Lupus        Lymphedema     Row Name 18 1300          Able to rate subjective pain?  yes  -MW    Pre-Treatment Pain Level  5  -MW    Post-Treatment Pain Level  4  -MW    Subjective Pain Comment  Lt axilla and \"pocket\"   -MW          Subjective Comments  Reports she has finished radiation therapy. Dr Fowler examined lump on Rt and does not think it is a concern/ scar tissue or fluid. Will check area on next scan, but no need to do anything now.   -MW          Ptting Edema Category  By severity  -MW    Pitting Edema  Moderate  -MW    Edema Assessment Comment  Mild Rt lateral trunk fullness; area of thickened rough fullness superior to lateral aspect of incision line seems slightly smaller. LT chest and lateral trunk with mild to moderate fullness.   -MW          Location/Assessment  Upper Extremity;Upper Quadrant  -MW    Upper Extremity Conditions  bilateral:;intact;clean;left:;other (comment) LT multiple tatoos  -MW    Upper Extremity Color/Pigment  bilateral:;normal  -MW    Upper Quadrant Conditions  right:;intact;left:;fragile;friable pink area in Lt axilla in area of darkened skin   -MW    Upper " Quadrant Color/Pigment  bilateral:;hyperpigmented;left:;erythema Lt chest/ axilla with new XRT color changes   -MW    Upper Quadrant Skin Details  Lt axilla with darkened skin color thin line of pink (? denuded); mild erythema and tenderness lateral aspect of incision line.   -MW       Manual Lymphatic Drainage  initial sequence;opened regional lymph nodes;opened anastamoses;extremity treatment;astym  -MW    Initial Sequence  supraclavicular;shoulder collectors  -MW    Supraclavicular  left;right  -MW    Shoulder Collectors  left;right  -MW    Abdomen  -- deferred due to fullness/ bloating   -MW    Opened Regional Lymph Nodes  inguinal;ribs  -MW    Inguinal  left;right  -MW    Ribs  Rt anterior   -MW    Opened Anastamoses  axillo-inguinal  -MW    Axillo-Inguinal  left;right  -MW    Extremity Treatment  MLD to proximal limb only;simple/brief MLD  -MW    Simple/Brief MLD  RUE & LUE   -MW    Astym  mastectomy protocol  -    Mastectomy Protocol  supine;sitting  -    Mastectomy Protocol Comment  Seated on stool with upper body resting on face craddle to focus on Rt & Lt upper back/ scapular area with evaluator and localizer; extra strokes to leveator scap, mid traps and teres mm groups. Continued strokes into lateral trunk adjacent to breast area. In supine: Evaluator and localizer to Rt and Lt chest, working around port on Lt and gently over areas of tenderness; avoided areas of erythema. Mild to moderate soft tissue disruptions Rt chest, and mild Lt chest. Continued ASTYM into lateral trunk / ribs; mild soft tissue disruptions on Rt and Lt. Repositioned in shoulder flex/ ABD to open axilla (partially able to achieve position bilat) and work on pectoralis tightness, soft tissue restrictions extra strokes Rt side only.   -    Manual Lymphatic Drainage Comments  MLD mixed with STM post ASTYM working around trunk with position changes. Very gentle on LT   -MW          Compression/Skin Care  skin care;wrapping  location;bandaging;compression garment  -    Skin Care  moisturizing lotion applied residual cocoa butter on trunk   -    Wrapping Location  upper extremity  -    Compression Garment Comments  pt forgot sleeves; will don at home   -      User Key  (r) = Recorded By, (t) = Taken By, (c) = Cosigned By    Initials Name Provider Type    Jennifer Solorio, PT Physical Therapist                        PT Assessment/Plan     Row Name 12/03/18 1300          PT Assessment    Functional Limitations  Performance in self-care ADL;Limitation in home management;Performance in work activities  -     Impairments  Edema;Impaired lymphatic circulation;Impaired postural alignment;Muscle strength;Pain;Range of motion;Sensation  -     Assessment Comments  Bilateral UE lymphedema stable this week; pt has been using pump consistently. Lt axilla skin fragile, with thin line of pink tissue in area of dark brown XRT changes. No weeping noted. Pt using aquaphor to Lt chest /axilla. Rt chest area of rough scar tissue stable to smaller than last week; cont to monitor. 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   -MW     Rehab Potential  Good  -     Patient/caregiver participated in establishment of treatment plan and goals  Yes  -MW     Patient would benefit from skilled therapy intervention  Yes  -MW        PT Plan    PT Frequency  1x/week  -     Physical Therapy Interventions (Optional Details)  manual therapy techniques;manual lymphatic drainage;postural re-education;ROM (Range of Motion);stretching;strengthening;home exercise program  -     PT Plan Comments  Cont ASTYM/STM, MLD as tolerated monitor LT upper quarter skin changes with XRT; progress Ther exer for shoulder ROM/ flexibility as pain allows.   -       User Key  (r) = Recorded By, (t) = Taken By, (c) = Cosigned By    Initials Name Provider Type    Jennifer Solorio, PT Physical Therapist        "        Exercises     Row Name 12/03/18 1300             Subjective Comments    Subjective Comments  Reports she has finished radiation therapy. Dr Fowler examined lump on Rt and does not think it is a concern/ scar tissue or fluid. Will check area on next scan, but no need to do anything now.   -MW         Subjective Pain    Able to rate subjective pain?  yes  -MW      Pre-Treatment Pain Level  5  -MW      Post-Treatment Pain Level  4  -MW      Subjective Pain Comment  Lt axilla and \"pocket\"   -MW         Total Minutes    20356 - PT Manual Therapy Minutes  55  -MW         Exercise 1    Exercise Name 1  RT HBH wings   -MW      Cueing 1  Tactile  -MW      Reps 1  9  -MW      Additional Comments  concurrent with Manual therapy  -MW         Exercise 2    Exercise Name 2  LT HBH wings   -MW      Cueing 2  Tactile  -MW      Reps 2  6  -MW      Additional Comments  concurrent with Manual therapy  -MW        User Key  (r) = Recorded By, (t) = Taken By, (c) = Cosigned By    Initials Name Provider Type    MW Jennifer Noriega, PT Physical Therapist                       Manual Rx (last 36 hours)      Manual Treatments     Row Name 12/03/18 1300             Total Minutes    36392 - PT Manual Therapy Minutes  55  -MW         Manual Rx 1    Manual Rx 1 Location  Bilat chest / mastectomy sites   -MW      Manual Rx 1 Type  STM with tissue bending, lifting, rolling and space correction techniques at lateral pectoralis border and at incision lines   -MW      Manual Rx 1 Grade  mod Rt, gentle Lt   -MW      Manual Rx 1 Duration  15  -MW         Manual Rx 2    Manual Rx 2 Location  RT & LT upper trap, mid trap, leveator scap and teres mm groups wrapping around into posterior axilla   -MW      Manual Rx 2 Type  STM with tissue bending, lifting and space correction techniques   -MW      Manual Rx 2 Grade  gentle to moderate   -MW      Manual Rx 2 Duration  20  -MW        User Key  (r) = Recorded By, (t) = Taken By, (c) = Cosigned By    " Initials Name Provider Type    MW Jennifer Noriega, PT Physical Therapist              Therapy Education  Education Details: Focus on stretching Lt to minimize tightness from XRT healing. Cont lymphpump and compression daily.   Given: Symptoms/condition management, Edema management  Program: Reinforced  How Provided: Verbal  Provided to: Patient  Level of Understanding: Verbalized              Time Calculation:   Start Time: 1300  Total Timed Code Minutes- PT: 55 minute(s)   Therapy Suggested Charges     Code   Minutes Charges    44149 (CPT®) Hc Pt Neuromusc Re Education Ea 15 Min      40149 (CPT®) Hc Pt Ther Proc Ea 15 Min      26823 (CPT®) Hc Gait Training Ea 15 Min      71562 (CPT®) Hc Pt Therapeutic Act Ea 15 Min      62870 (CPT®) Hc Pt Manual Therapy Ea 15 Min 55 4    31577 (CPT®) Hc Pt Ther Massage- Per 15 Min      05370 (CPT®) Hc Pt Iontophoresis Ea 15 Min      87399 (CPT®) Hc Pt Elec Stim Ea-Per 15 Min      66345 (CPT®) Hc Pt Ultrasound Ea 15 Min      46941 (CPT®) Hc Pt Self Care/Mgmt/Train Ea 15 Min      54878 (CPT®) Hc Pt Prosthetic (S) Train Initial Encounter, Each 15 Min      40054 (CPT®) Hc Orthotic(S) Mgmt/Train Initial Encounter, Each 15min      83774 (CPT®) Hc Pt Aquatic Therapy Ea 15 Min      51046 (CPT®) Hc Pt Orthotic(S)/Prosthetic(S) Encounter, Each 15 Min       (CPT®) Hc Pt Electrical Stim Unattended      Total  55 4        Therapy Charges for Today     Code Description Service Date Service Provider Modifiers Qty    82956546597 HC PT MANUAL THERAPY EA 15 MIN 12/3/2018 Jennifer Noriega, PT GP 4                    Jennifer Noriega, PT  12/3/2018

## 2018-12-10 ENCOUNTER — HOSPITAL ENCOUNTER (OUTPATIENT)
Dept: PHYSICAL THERAPY | Facility: HOSPITAL | Age: 48
Setting detail: THERAPIES SERIES
Discharge: HOME | End: 2018-12-10
Attending: SURGERY

## 2018-12-10 DIAGNOSIS — N64.4 MASTODYNIA OF LEFT BREAST: Primary | ICD-10-CM

## 2018-12-10 DIAGNOSIS — I89.0 LYMPHEDEMA OF RIGHT UPPER EXTREMITY: ICD-10-CM

## 2018-12-10 DIAGNOSIS — L90.5 SCAR CONDITIONS AND FIBROSIS OF SKIN: ICD-10-CM

## 2018-12-10 DIAGNOSIS — R68.89 IMPAIRED FUNCTION OF UPPER EXTREMITY: ICD-10-CM

## 2018-12-10 DIAGNOSIS — I89.0 LYMPHEDEMA OF LEFT UPPER EXTREMITY: ICD-10-CM

## 2018-12-10 DIAGNOSIS — I97.2 POST-MASTECTOMY LYMPHEDEMA SYNDROME: ICD-10-CM

## 2018-12-10 PROCEDURE — 97140 MANUAL THERAPY 1/> REGIONS: CPT | Performed by: PHYSICAL THERAPIST

## 2018-12-10 NOTE — THERAPY TREATMENT NOTE
Outpatient Physical Therapy Lymphedema Treatment Note  [facility]     Patient Name: Tia Sommers  : 1970  MRN: [id number]  Today's Date: 12/10/2018        Visit Date: 12/10/2018    Visit Dx:    ICD-10-CM ICD-9-CM   1. Mastodynia of left breast N64.4 611.71   2. Scar conditions and fibrosis of skin L90.5 709.2   3. Lymphedema of left upper extremity I89.0 457.1   4. Lymphedema of right upper extremity I89.0 457.1   5. Impaired function of upper extremity R68.89 V49.5   6. Post-mastectomy lymphedema syndrome I97.2 457.0       Patient Active Problem List   Diagnosis   • Malignant neoplasm of upper-inner quadrant of left breast in female, estrogen receptor positive (CMS/HCC)   • Epigastric pain   • Elevated LFTs   • Abnormal CT of the chest   • Malignant neoplasm of left breast in female, estrogen receptor positive (CMS/HCC)   • History of ITP   • Lupus        Lymphedema     Row Name 12/10/18 1300          Able to rate subjective pain?  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   -MW          Ptting Edema Category  By severity  -MW    Pitting Edema  Moderate  -MW    Edema Assessment Comment  Mild Rt lateral trunk fullness; area of thickened rough fullness superior to lateral aspect of incision line seems slightly smaller and less rough more fibrous. LT chest and lateral trunk with mild to moderate fullness. UE's appear to be at baseline RT > LT lymphedema   -MW          Location/Assessment  Upper Extremity;Upper Quadrant  -MW    Upper Extremity Conditions  bilateral:;intact;clean;left:;other (comment) LT multiple tatoos  -MW    Upper Extremity Color/Pigment  bilateral:;normal  -MW    Upper Quadrant Conditions   "right:;intact;left:;fragile;friable pink area in Lt axilla in area of darkened skin   -MW    Upper Quadrant Color/Pigment  bilateral:;hyperpigmented;left:;erythema Lt chest/ axilla with new XRT color changes   -MW    Upper Quadrant Skin Details  Lt chest and axilla with XRT \"tan\" area, with faint erythema along incision line (boost area) and faint pink area that was present last week, healing well. Areas remain tender but less than last week.   -MW          Manual Lymphatic Drainage  initial sequence;opened regional lymph nodes;opened anastamoses;extremity treatment;astym  -MW    Initial Sequence  supraclavicular;shoulder collectors  -MW    Supraclavicular  left;right  -MW    Shoulder Collectors  left;right  -MW    Abdomen  -- deferred due to fullness/ bloating   -MW    Opened Regional Lymph Nodes  inguinal;ribs  -MW    Inguinal  left;right  -MW    Ribs  Rt and Lt anterior   -MW    Opened Anastamoses  axillo-inguinal  -MW    Axillo-Inguinal  left;right  -MW    Extremity Treatment  MLD to full limb  -MW    MLD to Full Limb  BUE Rt > Lt   -MW    Astym  mastectomy protocol  -MW    Mastectomy Protocol  supine;sitting  -MW    Mastectomy Protocol Comment  Initiated treatment seated on stool with upper body resting on face craddle to focus on Rt & Lt upper back/ scapular area with evaluator and localizer; extra strokes to leveator scap, mid traps and teres mm groups. Continued strokes into lateral trunk adjacent to breast area. In supine: Evaluator and localizer to Rt and Lt chest, working around port on Lt and gently over areas of tenderness; avoided areas of erythema. Mild to moderate soft tissue disruptions Rt chest, and mild Lt chest. Continued ASTYM into lateral trunk / ribs; mild soft tissue disruptions on Rt and Lt. Repositioned in shoulder flex/ ABD to open axilla (partially able to achieve position bilat) and work on pectoralis tightness, soft tissue restrictions extra strokes bilaterally.   -MW    Manual " Lymphatic Drainage Comments  MLD mixed with STM post ASTYM working around trunk with position changes.   -MW          Compression/Skin Care  skin care;wrapping location;bandaging;compression garment  -    Skin Care  moisturizing lotion applied residual cocoa butter on trunk   -    Wrapping Location  upper extremity  -    Compression Garment Comments  assisted pt to don Bilat UE sleeves; added extra support to worn RUE sleeve (size 3 compressogrip to forearm with double layer at wrist)   -      User Key  (r) = Recorded By, (t) = Taken By, (c) = Cosigned By    Initials Name Provider Type    MW Jennifer Noriega, PT Physical Therapist                        PT Assessment/Plan     Row Name 12/10/18 1300          PT Assessment    Functional Limitations  Performance in self-care ADL;Limitation in home management;Performance in work activities  -     Impairments  Edema;Impaired lymphatic circulation;Impaired postural alignment;Muscle strength;Pain;Range of motion;Sensation  -     Assessment Comments  Lt chest and axillary skin with improved color and less tender. Lt chest soft tissues remain mobile, but tender along incision line; except area of tightness at lateral pectoral border/ anterior axillary fold with tight restrictions. Posterior axillary folds also with area of tightness. Rt chest area of rough fullness now more fibrous and less fullness.  Bilateral UE lymphedema stable with pt reported increased activities.   -MW     Rehab Potential  Good  -MW     Patient/caregiver participated in establishment of treatment plan and goals  Yes  -MW     Patient would benefit from skilled therapy intervention  Yes  -MW        PT Plan    PT Frequency  1x/week  -     Physical Therapy Interventions (Optional Details)  manual therapy techniques;manual lymphatic drainage;postural re-education;ROM (Range of Motion);stretching;strengthening;home exercise program  -     PT Plan Comments  Cont ASTYM/STM, MLD as tolerated  monitor LT upper quarter skin changes with XRT; progress Ther exer for shoulder ROM/ flexibility as pain allows.   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   -MW         Subjective Pain    Able to rate subjective pain?  yes  -MW      Pre-Treatment Pain Level  3  -MW      Post-Treatment Pain Level  5  -MW      Subjective Pain Comment  Lt axilla/ chest; overall a 3/10 most of the time   -MW         Total Minutes    69434 - PT Manual Therapy Minutes  55  -MW         Exercise 1    Exercise Name 1  RT HB wings   -MW      Cueing 1  Tactile  -MW      Reps 1  9  -MW      Additional Comments  concurrent with Manual therapy  -MW         Exercise 2    Exercise Name 2  LT HBH wings   -MW      Cueing 2  Tactile  -MW      Reps 2  6  -MW      Additional Comments  concurrent with Manual therapy  -MW        User Key  (r) = Recorded By, (t) = Taken By, (c) = Cosigned By    Initials Name Provider Type    Jennifer Solorio, PT Physical Therapist                       Manual Rx (last 36 hours)      Manual Treatments     Row Name 12/10/18 1300             Total Minutes    03770 - PT Manual Therapy Minutes  55  -MW         Manual Rx 1    Manual Rx 1 Location  Bilat chest / mastectomy sites   -MW      Manual Rx 1 Type  STM with tissue bending, lifting, rolling and space correction techniques at lateral pectoralis border and at incision lines   -MW      Manual Rx 1 Grade  mod Rt, gentle Lt   -MW      Manual Rx 1 Duration  15  -MW         Manual Rx 2    Manual Rx 2 Location  RT & LT upper trap, mid trap, leveator scap and teres mm groups wrapping around into posterior axilla   -MW      Manual Rx 2 Type  STM with  tissue bending, lifting and space correction techniques; trigger point release at subscap / posterior axillary fold    -MW      Manual Rx 2 Grade  gentle to moderate   -MW      Manual Rx 2 Duration  20  -MW        User Key  (r) = Recorded By, (t) = Taken By, (c) = Cosigned By    Initials Name Provider Type    Jennifer Solorio, PT Physical Therapist                             Time Calculation:   Start Time: 1300  Total Timed Code Minutes- PT: 55 minute(s)   Therapy Suggested Charges     Code   Minutes Charges    74633 (CPT®) Hc Pt Neuromusc Re Education Ea 15 Min      74373 (CPT®) Hc Pt Ther Proc Ea 15 Min      39118 (CPT®) Hc Gait Training Ea 15 Min      19063 (CPT®) Hc Pt Therapeutic Act Ea 15 Min      40702 (CPT®) Hc Pt Manual Therapy Ea 15 Min 55 4    69888 (CPT®) Hc Pt Ther Massage- Per 15 Min      05398 (CPT®) Hc Pt Iontophoresis Ea 15 Min      41949 (CPT®) Hc Pt Elec Stim Ea-Per 15 Min      36304 (CPT®) Hc Pt Ultrasound Ea 15 Min      13998 (CPT®) Hc Pt Self Care/Mgmt/Train Ea 15 Min      23266 (CPT®) Hc Pt Prosthetic (S) Train Initial Encounter, Each 15 Min      70400 (CPT®) Hc Orthotic(S) Mgmt/Train Initial Encounter, Each 15min      25466 (CPT®) Hc Pt Aquatic Therapy Ea 15 Min      98008 (CPT®) Hc Pt Orthotic(S)/Prosthetic(S) Encounter, Each 15 Min       (CPT®) Hc Pt Electrical Stim Unattended      Total  55 4        Therapy Charges for Today     Code Description Service Date Service Provider Modifiers Qty    91153220658 HC PT MANUAL THERAPY EA 15 MIN 12/10/2018 Jennifer Noriega, PT GP 4                    Jennifer Noriega, PT  12/10/2018

## 2018-12-17 ENCOUNTER — HOSPITAL ENCOUNTER (OUTPATIENT)
Dept: PHYSICAL THERAPY | Facility: HOSPITAL | Age: 48
Setting detail: THERAPIES SERIES
Discharge: HOME | End: 2018-12-17
Attending: SURGERY

## 2018-12-17 DIAGNOSIS — I97.2 POST-MASTECTOMY LYMPHEDEMA SYNDROME: ICD-10-CM

## 2018-12-17 DIAGNOSIS — R68.89 IMPAIRED FUNCTION OF UPPER EXTREMITY: ICD-10-CM

## 2018-12-17 DIAGNOSIS — I89.0 LYMPHEDEMA OF RIGHT UPPER EXTREMITY: ICD-10-CM

## 2018-12-17 DIAGNOSIS — N64.4 MASTODYNIA OF LEFT BREAST: Primary | ICD-10-CM

## 2018-12-17 DIAGNOSIS — L90.5 SCAR CONDITIONS AND FIBROSIS OF SKIN: ICD-10-CM

## 2018-12-17 DIAGNOSIS — I89.0 LYMPHEDEMA OF LEFT UPPER EXTREMITY: ICD-10-CM

## 2018-12-17 PROCEDURE — 97140 MANUAL THERAPY 1/> REGIONS: CPT | Performed by: PHYSICAL THERAPIST

## 2018-12-18 ENCOUNTER — OFFICE VISIT (OUTPATIENT)
Dept: GASTROENTEROLOGY | Facility: CLINIC | Age: 48
End: 2018-12-18

## 2018-12-18 VITALS
TEMPERATURE: 97.8 F | WEIGHT: 176.2 LBS | BODY MASS INDEX: 28.32 KG/M2 | DIASTOLIC BLOOD PRESSURE: 80 MMHG | OXYGEN SATURATION: 92 % | SYSTOLIC BLOOD PRESSURE: 140 MMHG | HEIGHT: 66 IN | HEART RATE: 98 BPM

## 2018-12-18 DIAGNOSIS — K21.9 CHRONIC GERD: ICD-10-CM

## 2018-12-18 DIAGNOSIS — R10.13 DYSPEPSIA: Primary | ICD-10-CM

## 2018-12-18 DIAGNOSIS — Z85.3 HISTORY OF BREAST CANCER: ICD-10-CM

## 2018-12-18 DIAGNOSIS — K21.00 GASTRO-ESOPHAGEAL REFLUX DISEASE WITH ESOPHAGITIS: ICD-10-CM

## 2018-12-18 DIAGNOSIS — Z79.1 ENCOUNTER FOR LONG-TERM (CURRENT) USE OF NSAIDS: ICD-10-CM

## 2018-12-18 DIAGNOSIS — Z86.2 HISTORY OF AUTOIMMUNE DISEASE: ICD-10-CM

## 2018-12-18 DIAGNOSIS — Z79.52 LONG TERM (CURRENT) USE OF SYSTEMIC STEROIDS: ICD-10-CM

## 2018-12-18 PROCEDURE — 99213 OFFICE O/P EST LOW 20 MIN: CPT | Performed by: NURSE PRACTITIONER

## 2018-12-18 NOTE — THERAPY PROGRESS REPORT/RE-CERT
Outpatient Physical Therapy Lymphedema Progress Note  [facility]     Patient Name: Tia Sommers  : 1970  MRN: [id number]  Today's Date: 2018        Visit Date: 2018    Visit Dx:    ICD-10-CM ICD-9-CM   1. Mastodynia of left breast N64.4 611.71   2. Scar conditions and fibrosis of skin L90.5 709.2   3. Lymphedema of left upper extremity I89.0 457.1   4. Lymphedema of right upper extremity I89.0 457.1   5. Impaired function of upper extremity R68.89 V49.5   6. Post-mastectomy lymphedema syndrome I97.2 457.0       Patient Active Problem List   Diagnosis   • Malignant neoplasm of upper-inner quadrant of left breast in female, estrogen receptor positive (CMS/HCC)   • Epigastric pain   • Elevated LFTs   • Abnormal CT of the chest   • Malignant neoplasm of left breast in female, estrogen receptor positive (CMS/HCC)   • History of ITP   • Lupus        Lymphedema     Row Name 18 1410          Able to rate subjective pain?  yes  -MW    Pre-Treatment Pain Level  4  -MW    Post-Treatment Pain Level  4  -MW    Subjective Pain Comment  Lt axilla/ chest; overall a 3/10 most of the time   -MW          Subjective Comments  Reports more tight but also hasn't been stretching as much; has been using pump and compression sleeves. mom in [facility] (picked her up from [place] last week, and will be going south for [place]).   -MW          Rt Shoulder Abduction AROM  0-115; tightness in axilla and chest   -MW    Rt Shoulder Extension AROM  0-55  -MW    Rt Shoulder Flexion AROM  0-128; tightness in axilla   -MW    Rt Shoulder External Rotation AROM  HBH elbow to table lacks 50%   -MW    Rt Shoulder Internal Rotation AROM  hand to mid back; WFL   -MW          Lt Shoulder Abduction AROM  0-110; tightness in axilla   -MW    Lt Shoulder Extension AROM  0-62  -MW    Lt Shoulder Flexion AROM  0-125; tightness in axilla   -MW    Lt Shoulder External Rotation AROM  HBH elbow lacks 50% to table   -MW    Lt Shoulder  Internal Rotation AROM  Hand to mid back   -MW          Ptting Edema Category  By severity  -MW    Pitting Edema  Moderate  -MW    Edema Assessment Comment  Mild to moderate Rt lateral trunk fullness; area of thickened rough fullness superior to lateral aspect of incision line seems smoother and smaller. LT chest and lateral trunk with mild to moderate fullness; mild pitting edema across LT chest. UE's appear to be at baseline RT > LT lymphedema   -MW          Location/Assessment  Upper Extremity;Upper Quadrant  -MW    Upper Extremity Conditions  bilateral:;intact;clean;left:;other (comment) LT multiple tatoos  -MW    Upper Extremity Color/Pigment  bilateral:;normal  -MW    Upper Quadrant Conditions  bilateral:;intact;left:;fragile;dry  -MW    Upper Quadrant Color/Pigment  bilateral:;hyperpigmented;left:;erythema Lt chest/ axilla with new XRT color changes   -MW    Upper Quadrant Skin Details  Lt chest anterior skin with improved color, small areas of dry skin sloughing off but pink new epithelial tissue in place.   -MW          Lymphedema Sensation Comments  Small areas of tenderness/ hypersensitivity at lateral chest (pockets), some residual areas of numbness/ impaired sensation adjacent to incision lines.   -MW          Manual Lymphatic Drainage  initial sequence;opened regional lymph nodes;opened anastamoses;extremity treatment;astym  -MW    Initial Sequence  supraclavicular;shoulder collectors  -MW    Supraclavicular  left;right  -MW    Shoulder Collectors  left;right  -MW    Abdomen  -- deferred due to fullness/ bloating   -MW    Opened Regional Lymph Nodes  inguinal;ribs  -MW    Inguinal  left;right  -MW    Opened Anastamoses  axillo-inguinal  -MW    Axillo-Inguinal  left;right  -MW    Extremity Treatment  simple/brief MLD;MLD to proximal limb only  -MW    MLD to Proximal Limb Only  RUE & LUE   -MW    Astym  mastectomy protocol  -MW    Mastectomy Protocol  supine;sitting  -MW    Mastectomy Protocol Comment   Initiated treatment seated on stool with upper body resting on face craddle to focus on Rt & Lt upper back/ scapular area with evaluator and localizer; extra strokes to supraspinatus, leveator scap, mid traps and teres mm groups. Continued strokes into lateral trunk adjacent to breast area. In supine: Evaluator and localizer to Rt and Lt chest, working around port on Lt and gently over areas of tenderness. Mild to moderate soft tissue disruptions Rt chest, and mild Lt chest. Continued ASTYM into lateral trunk / ribs; mild soft tissue disruptions on Rt and Lt. Repositioned in shoulder flex/ ABD to open axilla (partially able to achieve position bilat) and work on pectoralis tightness, soft tissue restrictions extra strokes bilaterally.   -MW    Manual Lymphatic Drainage Comments  MLD mixed with STM post ASTYM working around trunk with position changes.   -MW          Compression/Skin Care  skin care;wrapping location;bandaging;compression garment  -MW    Skin Care  moisturizing lotion applied residual cocoa butter on trunk   -MW    Wrapping Location  upper extremity  -      User Key  (r) = Recorded By, (t) = Taken By, (c) = Cosigned By    Initials Name Provider Type    MW Jennifer Noriega, PT Physical Therapist                        PT Assessment/Plan     Row Name 12/17/18 1410          PT Assessment    Functional Limitations  Performance in self-care ADL;Limitation in home management;Performance in work activities  -     Impairments  Edema;Impaired lymphatic circulation;Impaired postural alignment;Muscle strength;Pain;Range of motion;Sensation  -MW     Assessment Comments  Bilateral shoulder ROM remains impaired by soft tissue restrictions in axilla and chest areas. Lt shoulder has regained ROM lost during XRT, but Rt shoulder with slight decrease from last month; suspect pt has been more focused on Lt shoulder with recent completion of XRT. Pt with slight improvement on DASH, but remains > 40% impaired in UE  function. Expect pt to continue to gradually regain more shoulder ROM and soft tissue flexibility with continued PT to maximize her function as she plans to return to work soon with some restrictions but will need increased ROM and strength to resume full duty in the future.   -MW     Rehab Potential  Good  -MW     Patient/caregiver participated in establishment of treatment plan and goals  Yes  -MW     Patient would benefit from skilled therapy intervention  Yes  -MW        PT Plan    PT Frequency  1x/week  -MW     Predicted Duration of Therapy Intervention (Therapy Eval)  12 visits  -MW     Planned CPT's?  PT MANUAL THERAPY EA 15 MIN: 31364;PT THER PROC EA 15 MIN: 25966;PT SELF CARE/MGMT/TRAIN 15 MIN: 61505  -MW     Physical Therapy Interventions (Optional Details)  manual therapy techniques;manual lymphatic drainage;postural re-education;ROM (Range of Motion);stretching;strengthening;home exercise program  -     PT Plan Comments  Cont ASTYM/ STM/ MLD; cont to progress ther exer / HEP for return to work   -       User Key  (r) = Recorded By, (t) = Taken By, (c) = Cosigned By    Initials Name Provider Type    MW Jennifer Noriega, PT Physical Therapist               Exercises     Row Name 12/17/18 1410             Subjective Comments    Subjective Comments  Reports more tight but also hasn't been stretching as much; has been using pump and compression sleeves. mom in [facility] (picked her up from [place] last week, and will be going south for [place]).   -MW         Subjective Pain    Able to rate subjective pain?  yes  -MW      Pre-Treatment Pain Level  4  -MW      Post-Treatment Pain Level  4  -MW      Subjective Pain Comment  Lt axilla/ chest; overall a 3/10 most of the time   -MW         Total Minutes    31156 - PT Manual Therapy Minutes  55  -MW         Exercise 1    Exercise Name 1  RT HBH wings   -MW      Cueing 1  Tactile  -MW      Reps 1  9  -MW      Additional Comments  concurrent with Manual therapy   -MW         Exercise 2    Exercise Name 2  LT HBH wings   -MW      Cueing 2  Tactile  -MW      Reps 2  6  -MW      Additional Comments  concurrent with Manual therapy  -MW        User Key  (r) = Recorded By, (t) = Taken By, (c) = Cosigned By    Initials Name Provider Type    Jennifer Solorio, PT Physical Therapist                       Manual Rx (last 36 hours)      Manual Treatments     Row Name 12/17/18 1410             Total Minutes    51116 - PT Manual Therapy Minutes  55  -MW         Manual Rx 1    Manual Rx 1 Location  Bilat chest / mastectomy sites   -MW      Manual Rx 1 Type  STM with tissue bending, lifting, rolling and space correction techniques at lateral pectoralis border and at incision lines   -MW      Manual Rx 1 Grade  mod Rt, gentle Lt   -MW      Manual Rx 1 Duration  15  -MW         Manual Rx 2    Manual Rx 2 Location  RT & LT upper trap, mid trap, leveator scap and teres mm groups wrapping around into posterior axilla   -MW      Manual Rx 2 Type  STM with tissue bending, lifting and space correction techniques; trigger point release at subscap / posterior axillary fold    -MW      Manual Rx 2 Grade  gentle to moderate   -MW      Manual Rx 2 Duration  25  -MW        User Key  (r) = Recorded By, (t) = Taken By, (c) = Cosigned By    Initials Name Provider Type    Jennifer Solorio, PT Physical Therapist          PT OP Goals     Row Name 12/17/18 1410          STG 1  Pt independent with basic HEP for shoulder ROM and posture.   -MW    STG 1 Progress  Met  -MW    STG 2  RUE lymphedema to decrease at least 10%.   -MW    STG 2 Progress  Partially Met;Progressing  -MW    STG 3  LT shoulder flexibility to improve at least 15 degrees to improve self care and prepare for positioning for XRT.   -MW    STG 3 Progress  Met  -MW    STG 3 Progress Comments  ROM returning post completion of XRT on Lt   -MW    STG 4  Pt to report decreased pain ratings on DASH to 3/5 or less.   -MW    STG 4 Progress  Met   -          LTG 1  Pt independent with advanced HEP for posture, flexibility and strengthening to promote safe self care.   -    LTG 1 Progress  Ongoing;Progressing  -    LTG 2  Pt to demonstrate improved function with at least 20% improvement on DASH score.   -    LTG 2 Progress  Met  -    LTG 3  Bilat shoulder flexion to be greater than 160 deg, for pt to be able to achieve positioning for XRT.   -    LTG 3 Progress  Ongoing  -    LTG 3 Progress Comments  Rt shoulder with mild decrease on ROM this month; has been focused on Lt shoulder more regaining ROM post XRT.   -    LTG 4  Pt measured for LUE arm sleeve; check fit   -    LTG 4 Progress  Met  -    LTG 5  Pt to be independent with self lymphedema care; including use of compression pump, compression sleeves/ gloves and skin care.   -    LTG 5 Progress  Met  -          PT Goal Re-Cert Due Date  03/17/19  -      User Key  (r) = Recorded By, (t) = Taken By, (c) = Cosigned By    Initials Name Provider Type    Jennifer Solorio, PT Physical Therapist          Therapy Education  Education Details: Cont HEP and compression pump, skin care and compression sleeves.   Given: HEP, Edema management, Symptoms/condition management  Program: Reinforced  How Provided: Verbal  Provided to: Patient  Level of Understanding: Verbalized    Outcome Measure Options: Disabilities of the Arm, Shoulder, and Hand (DASH)  DASH  Open a tight or new jar.: Moderate Difficulty  Write: Mild Difficulty  Turn a key: Mild Difficulty  Prepare a meal: Mild Difficulty  Push open a heavy door: Moderate Difficulty  Place an object on a shelf above your head: Severe Difficulty  Do heavy household chores (e.g., wash walls, wash floors): Moderate Difficulty  Garden or do yard work: Moderate Difficulty  Make a bed: Mild Difficulty  Carry a shopping bag or briefcase: Mild Difficulty  Carry a heavy object (over 10 lbs): Moderate Difficulty  Change a lightbulb overhead: Severe  Difficulty  Wash or blow dry your hair: Moderate Difficulty  Wash your back: Moderate Difficulty  Put on a pullover sweater: Moderate Difficulty  Use a knife to cut food: Mild Difficulty  Recreational activities in which require little effort (e.g., cardplaying, knitting, etc.): Mild Difficulty  Recreational activities in which you take some force or impact through your arm, should or hand (e.g. golf, hammering, tennis, etc.): Severe Difficulty  Recreational Activities in which you move your arm freely (e.g., frisbee, badminton, etc.): Mild Difficulty  Manage transportation needs (getting from one place to another): Mild Difficulty  Sexual Activities: Mild Difficulty  During the past week, to what extent has your arm, shoulder, or hand problem interfered with your normal social activites with family, friends, neighbors or groups?: Moderately  During the past week, were you limited in your work or other regular daily activities as a result of your arm, shoulder or hand problem?: Moderately Limited  Arm, Shoulder, or hand pain: Moderate  Arm, shoulder or hand pain when you performed any specific activity: Moderate  Tingling (pins and needles) in your arm, shoulder, or hand: Moderate  Weakness in your arm, shoulder or hand: Mild  Stiffness in your arm, shoulder or hand: Mild  During the past week, how much difficulty have you had sleeping because of the pain in your arm, shoulder or hand?: Mild Difficulty  I feel less capable, less confident or less useful because of my arm, shoulder or hand problem: Disagree  DASH Sum : 79  Number of Questions Answered: 30  DASH Score: 40.83         Time Calculation:   Start Time: 1410  Total Timed Code Minutes- PT: 55 minute(s)   Therapy Suggested Charges     Code   Minutes Charges    35069 (CPT®) Hc Pt Neuromusc Re Education Ea 15 Min      41886 (CPT®) Hc Pt Ther Proc Ea 15 Min      76342 (CPT®) Hc Gait Training Ea 15 Min      66411 (CPT®) Hc Pt Therapeutic Act Ea 15 Min      43778  (CPT®) Hc Pt Manual Therapy Ea 15 Min 55 4    76016 (CPT®) Hc Pt Ther Massage- Per 15 Min      04207 (CPT®) Hc Pt Iontophoresis Ea 15 Min      58964 (CPT®) Hc Pt Elec Stim Ea-Per 15 Min      44113 (CPT®) Hc Pt Ultrasound Ea 15 Min      09378 (CPT®) Hc Pt Self Care/Mgmt/Train Ea 15 Min      65025 (CPT®) Hc Pt Prosthetic (S) Train Initial Encounter, Each 15 Min      61833 (CPT®) Hc Orthotic(S) Mgmt/Train Initial Encounter, Each 15min      88176 (CPT®) Hc Pt Aquatic Therapy Ea 15 Min      84847 (CPT®) Hc Pt Orthotic(S)/Prosthetic(S) Encounter, Each 15 Min       (CPT®) Hc Pt Electrical Stim Unattended      Total  55 4        Therapy Charges for Today     Code Description Service Date Service Provider Modifiers Qty    [id number] HC PT MANUAL THERAPY EA 15 MIN 12/17/2018 Jennifer Noriega, PT GP 4          PT G-Codes  Outcome Measure Options: Disabilities of the Arm, Shoulder, and Hand (DASH)         Jennifer Noriega, PT  12/17/2018

## 2018-12-21 ENCOUNTER — HOSPITAL ENCOUNTER (OUTPATIENT)
Dept: ONCOLOGY | Facility: HOSPITAL | Age: 48
Setting detail: INFUSION SERIES
Discharge: HOME | End: 2018-12-21

## 2018-12-21 VITALS
TEMPERATURE: 98.5 F | WEIGHT: 175 LBS | HEART RATE: 102 BPM | HEIGHT: 66 IN | DIASTOLIC BLOOD PRESSURE: 88 MMHG | RESPIRATION RATE: 18 BRPM | SYSTOLIC BLOOD PRESSURE: 148 MMHG | BODY MASS INDEX: 28.12 KG/M2

## 2018-12-21 DIAGNOSIS — Z17.0 MALIGNANT NEOPLASM OF UPPER-INNER QUADRANT OF LEFT BREAST IN FEMALE, ESTROGEN RECEPTOR POSITIVE (HCC): Primary | ICD-10-CM

## 2018-12-21 DIAGNOSIS — C50.212 MALIGNANT NEOPLASM OF UPPER-INNER QUADRANT OF LEFT BREAST IN FEMALE, ESTROGEN RECEPTOR POSITIVE (HCC): Primary | ICD-10-CM

## 2018-12-21 DIAGNOSIS — R10.13 DYSPEPSIA: ICD-10-CM

## 2018-12-21 LAB — HBA1C MFR BLD: 5.4 % (ref 4.8–5.6)

## 2018-12-21 PROCEDURE — 36591 DRAW BLOOD OFF VENOUS DEVICE: CPT

## 2018-12-21 PROCEDURE — 83036 HEMOGLOBIN GLYCOSYLATED A1C: CPT | Performed by: NURSE PRACTITIONER

## 2018-12-21 RX ORDER — SODIUM CHLORIDE 0.9 % (FLUSH) 0.9 %
10 SYRINGE (ML) INJECTION AS NEEDED
Status: CANCELLED | OUTPATIENT
Start: 2018-12-21

## 2018-12-21 RX ADMIN — HEPARIN 500 UNITS: 100 SYRINGE at 13:15

## 2019-01-03 ENCOUNTER — DOCUMENTATION (OUTPATIENT)
Dept: NUTRITION | Facility: HOSPITAL | Age: 49
End: 2019-01-03

## 2019-01-03 NOTE — PROGRESS NOTES
Oncology Nutrition Screening    Patient Name:  Tia Sommers  YOB: 1970  MRN: [id number]  Date:  01/03/19  Physician:  Dr. Valle / Dr. Fowler    Type of Cancer Treatment:   Surgery:   Bilateral mastectomy (5/16/18)  Radiation/Cyberknife: Definitive  Number of Treatments:  25  Chemotherapy:  Taxotere / Cytoxan - 4/4 cycles completed   Hormone Therapy: Arimidex - po daily + Lupron - every 3 months    Patient Active Problem List   Diagnosis   • Malignant neoplasm of upper-inner quadrant of left breast in female, estrogen receptor positive (CMS/HCC)   • Epigastric pain   • Elevated LFTs   • Abnormal CT of the chest   • Malignant neoplasm of left breast in female, estrogen receptor positive (CMS/HCC)   • History of ITP   • Lupus       Current Outpatient Medications   Medication Sig Dispense Refill   • esomeprazole (nexIUM) 20 MG capsule Take 1 capsule by mouth Daily. Take first thing in the morning on an empty stomach.  Wait at least 30 min to 1hr before eating. 30 capsule 1   • HYDROcodone-acetaminophen (NORCO) 5-325 MG per tablet Take 1 tablet by mouth Every 6 (Six) Hours As Needed for Severe Pain  for up to 12 doses. 12 tablet 0   • naproxen sodium (ALEVE) 220 MG tablet Take 440 mg by mouth 2 (Two) Times a Day As Needed.     • predniSONE (DELTASONE) 20 MG tablet Take 1 tablet by mouth Daily. (Patient taking differently: Take 10 mg by mouth Daily.) 30 tablet 1     No current facility-administered medications for this visit.        Glycemic Risk:   Steriods    Weight:   Height: 66 inches  Weight: 175 lbs.   BMI: 28.3  Overweight  Weight has increased ~15 pounds over last ~6 months    Oral Food Intake:  Regular Diet - No Restrictions    Hydration Status:   How many 8 ounce glass of water of fluid do you drink per day?  Not assessed at this time.    Enteral Feeding:   n/a    Nutrition Symptoms:   Esophagitis - improved with Nexium    Activity:   Not assessed at this time.     reports that she quit  smoking about 8 months ago. Her smoking use included cigarettes. She started smoking about 30 years ago. She smoked 0.25 packs per day. she has never used smokeless tobacco. She reports that she drinks about 0.6 oz of alcohol per week. She reports that she does not use drugs.    Evaluation of Nutritional Risk:   Patient is not at nutritional risk at this time.  Patient's chart reviewed and nutritional screening completed as above.  Will mail written diet materials with RD's contact information and instructions to call with questions.  Will follow up as indicated.  RD available to assist prn.    Electronically signed by:  Loren Darnell RD  2:43 PM

## 2019-01-04 ENCOUNTER — HOSPITAL ENCOUNTER (OUTPATIENT)
Dept: RADIATION ONCOLOGY | Facility: HOSPITAL | Age: 49
Setting detail: RADIATION/ONCOLOGY SERIES
Discharge: HOME | End: 2019-01-04

## 2019-01-04 ENCOUNTER — OFFICE VISIT (OUTPATIENT)
Dept: RADIATION ONCOLOGY | Facility: HOSPITAL | Age: 49
End: 2019-01-04

## 2019-01-04 VITALS
OXYGEN SATURATION: 93 % | HEART RATE: 82 BPM | TEMPERATURE: 96.9 F | WEIGHT: 176.5 LBS | RESPIRATION RATE: 18 BRPM | BODY MASS INDEX: 28.5 KG/M2 | SYSTOLIC BLOOD PRESSURE: 157 MMHG | DIASTOLIC BLOOD PRESSURE: 101 MMHG

## 2019-01-04 DIAGNOSIS — Z17.0 MALIGNANT NEOPLASM OF LEFT BREAST IN FEMALE, ESTROGEN RECEPTOR POSITIVE, UNSPECIFIED SITE OF BREAST (HCC): ICD-10-CM

## 2019-01-04 DIAGNOSIS — M32.19 OTHER SYSTEMIC LUPUS ERYTHEMATOSUS WITH OTHER ORGAN INVOLVEMENT (HCC): Primary | ICD-10-CM

## 2019-01-04 DIAGNOSIS — C50.212 MALIGNANT NEOPLASM OF UPPER-INNER QUADRANT OF LEFT BREAST IN FEMALE, ESTROGEN RECEPTOR POSITIVE (HCC): ICD-10-CM

## 2019-01-04 DIAGNOSIS — C50.912 MALIGNANT NEOPLASM OF LEFT BREAST IN FEMALE, ESTROGEN RECEPTOR POSITIVE, UNSPECIFIED SITE OF BREAST (HCC): ICD-10-CM

## 2019-01-04 DIAGNOSIS — Z17.0 MALIGNANT NEOPLASM OF UPPER-INNER QUADRANT OF LEFT BREAST IN FEMALE, ESTROGEN RECEPTOR POSITIVE (HCC): ICD-10-CM

## 2019-01-04 PROCEDURE — G0463 HOSPITAL OUTPT CLINIC VISIT: HCPCS | Performed by: RADIOLOGY

## 2019-01-04 RX ORDER — PREDNISONE 20 MG/1
20 TABLET ORAL DAILY
Qty: 30 TABLET | Refills: 1 | Status: SHIPPED | OUTPATIENT
Start: 2019-01-04

## 2019-01-04 NOTE — PROGRESS NOTES
FOLLOW UP NOTE    PATIENT:                                                      Tia Sommers  MEDICAL RECORD #:                        [id number]  :                                                          1970  COMPLETION DATE:    2018  DIAGNOSIS:     Cancer Staging  Malignant neoplasm of upper-inner quadrant of left breast in female, estrogen receptor positive (CMS/HCC)  Staging form: Breast, AJCC 8th Edition  - Clinical: Stage IB (cT2, cN0, cM0, G2, ER: Positive, IL: Positive, HER2: Negative) - Signed by Chad Valle MD on 2018  - Pathologic: Stage IIA (pT2, pN0, cM0, G2, ER: Positive, IL: Negative, HER2: Negative, Oncotype DX score: 28) - Signed by Chad Valle MD on 2018    BRIEF HISTORY:  Mrs. Sommers returns to clinic today 1 month after completing left chest wall radiation therapy for her pathologic T2 N0 ER positive, IL and HER-2 negative invasive lobular carcinoma.  She received systemic chemotherapy prior to her radiation, and during all of her treatments, she had a few intermittent arthritis flairs related to her Lupus.  She has been on Prednisone 10mg daily for the past 6 weeks.  She reports ongoing fatigue, and arthritis symptoms that are slowly improving.      MEDICATIONS: Medication reconciliation for the patient was reviewed and confirmed in the electronic medical record.    Review of Systems   Cardiovascular:        Lymphadema right arm- left wrist swelling intermittent per pt   Endocrine: Positive for hot flashes.   Musculoskeletal: Positive for arthralgias, back pain and neck pain.   Hematological: Bruises/bleeds easily.       KPS 80%    Physical Exam   Constitutional: She is oriented to person, place, and time. She appears well-developed and well-nourished. No distress.   HENT:   Head: Normocephalic and atraumatic.   Mouth/Throat: Oropharynx is clear and moist.   No thrush   Eyes: Conjunctivae and EOM are normal. Pupils are equal, round, and reactive to light.    Neck: Normal range of motion. Neck supple.   Cardiovascular: Normal rate and regular rhythm. Exam reveals no friction rub.   No murmur heard.  Pulmonary/Chest: Effort normal and breath sounds normal. She has no wheezes.   S/p bilateral mastectomy.  She has minimal skin discoloration at this point and no residual erythema or desquamation.   Abdominal: Soft. Bowel sounds are normal. She exhibits no distension and no mass. There is no tenderness.   Musculoskeletal: Normal range of motion. She exhibits no edema.   Describes joint tenderness in bilateral hands, wrists, hips, and knees   Lymphadenopathy:     She has no cervical adenopathy.   Neurological: She is alert and oriented to person, place, and time.   Skin: Skin is warm and dry.   Psychiatric: She has a normal mood and affect. Her behavior is normal. Judgment and thought content normal.   Nursing note and vitals reviewed.      VITAL SIGNS:   Vitals:    01/04/19 0951   BP: (!) 157/101  Comment: right ankle- repeat left ankle 153/101   Pulse: 82   Resp: 18   Temp: 96.9 °F (36.1 °C)   TempSrc: Temporal   SpO2: 93%   Weight: 80.1 kg (176 lb 8 oz)   PainSc:   6   PainLoc: Comment: joints- lupus       The following portions of the patient's history were reviewed and updated as appropriate: allergies, current medications, past family history, past medical history, past social history, past surgical history and problem list.         Tia was seen today for breast cancer.    Diagnoses and all orders for this visit:    Other systemic lupus erythematosus with other organ involvement (CMS/HCC)  -     predniSONE (DELTASONE) 20 MG tablet; Take 1 tablet by mouth Daily.    Malignant neoplasm of left breast in female, estrogen receptor positive, unspecified site of breast (CMS/HCC)    Malignant neoplasm of upper-inner quadrant of left breast in female, estrogen receptor positive (CMS/HCC)         IMPRESSION:  Mrs. Sommers is a 48-year-old female who is 1 month out from  completion of radiation therapy to the left chest wall for her T2 N0 hormone receptor positive and HER-2 negative invasive lobular carcinoma, and she also has a remote history of a right sided T2 N0 hormone receptor positive and HER-2 negative breast cancer.  At this point in time, she has no evidence of recurrent disease and I'm very pleased with at least her initial recovery.  She continues to be fatigued and I have encouraged her to continue using supplemental nutrition, and to begin an exercise program and I recommended to her that she consider joining [facility] for the [employer] program.  She has been doing physical therapy which I think is helping as well.  She was seen by plastic surgery may be undergoing some revision to the left chest wall and the upcoming months.  As she is already coordinated for follow-up in the medical oncology clinic for ongoing injections with Lupron, I scheduled her to return to my clinic in 6 months.    RECOMMENDATIONS:      Return in about 6 months (around 7/4/2019).    Chuck Fowler MD    Errors in dictation may reflect use of voice recognition software and not all errors in transcription may have been detected prior to signing.

## 2019-01-06 DIAGNOSIS — Z17.0 MALIGNANT NEOPLASM OF UPPER-INNER QUADRANT OF LEFT BREAST IN FEMALE, ESTROGEN RECEPTOR POSITIVE (HCC): ICD-10-CM

## 2019-01-06 DIAGNOSIS — C50.212 MALIGNANT NEOPLASM OF UPPER-INNER QUADRANT OF LEFT BREAST IN FEMALE, ESTROGEN RECEPTOR POSITIVE (HCC): ICD-10-CM

## 2019-01-07 ENCOUNTER — HOSPITAL ENCOUNTER (OUTPATIENT)
Dept: PHYSICAL THERAPY | Facility: HOSPITAL | Age: 49
Setting detail: THERAPIES SERIES
Discharge: HOME | End: 2019-01-07
Attending: SURGERY

## 2019-01-07 DIAGNOSIS — R68.89 IMPAIRED FUNCTION OF UPPER EXTREMITY: ICD-10-CM

## 2019-01-07 DIAGNOSIS — I89.0 LYMPHEDEMA OF LEFT UPPER EXTREMITY: ICD-10-CM

## 2019-01-07 DIAGNOSIS — L90.5 SCAR CONDITIONS AND FIBROSIS OF SKIN: ICD-10-CM

## 2019-01-07 DIAGNOSIS — I89.0 LYMPHEDEMA OF RIGHT UPPER EXTREMITY: ICD-10-CM

## 2019-01-07 DIAGNOSIS — N64.4 MASTODYNIA OF LEFT BREAST: Primary | ICD-10-CM

## 2019-01-07 PROCEDURE — 97140 MANUAL THERAPY 1/> REGIONS: CPT | Performed by: PHYSICAL THERAPIST

## 2019-01-07 NOTE — THERAPY TREATMENT NOTE
"    Outpatient Physical Therapy Lymphedema Treatment Note  [facility]     Patient Name: Tia Sommers  : 1970  MRN: [id number]  Today's Date: 2019        Visit Date: 2019    Visit Dx:    ICD-10-CM ICD-9-CM   1. Mastodynia of left breast N64.4 611.71   2. Scar conditions and fibrosis of skin L90.5 709.2   3. Lymphedema of left upper extremity I89.0 457.1   4. Lymphedema of right upper extremity I89.0 457.1   5. Impaired function of upper extremity R68.89 V49.5       Patient Active Problem List   Diagnosis   • Malignant neoplasm of upper-inner quadrant of left breast in female, estrogen receptor positive (CMS/HCC)   • Epigastric pain   • Elevated LFTs   • Abnormal CT of the chest   • Malignant neoplasm of left breast in female, estrogen receptor positive (CMS/HCC)   • History of ITP   • Lupus        Lymphedema     Row Name 19 1400          Able to rate subjective pain?  yes  -MW    Pre-Treatment Pain Level  6  -MW    Post-Treatment Pain Level  4  -MW    Subjective Pain Comment  \"Pockets sore\"   -MW          Subjective Comments  Pt reports more sore all over from Lupus and returning to work (back, neck, knees, hips)   -MW          Ptting Edema Category  By severity  -MW    Pitting Edema  Moderate  -MW    Edema Assessment Comment  Mild to moderate Rt lateral trunk fullness; area of thickened rough fullness superior to lateral aspect of incision line seems smoother and smaller. LT chest and lateral trunk with mild to moderate fullness; mild pitting edema across LT chest. UE's appear to be at baseline RT > LT lymphedema   -MW          Location/Assessment  Upper Extremity;Upper Quadrant  -MW    Upper Extremity Conditions  bilateral:;intact;clean;left:;other (comment) LT multiple tatoos  -MW    Upper Extremity Color/Pigment  bilateral:;normal  -MW    Upper Quadrant Conditions  bilateral:;intact;left:  -MW    Upper Quadrant Color/Pigment  bilateral:;hyperpigmented;left: bilat chest with XRT color " "changes   -MW          Manual Lymphatic Drainage  initial sequence;opened regional lymph nodes;opened anastamoses;extremity treatment;astym  -    Initial Sequence  supraclavicular;shoulder collectors  -MW    Supraclavicular  left;right  -MW    Shoulder Collectors  left;right  -MW    Abdomen  -- deferred due to fullness/ bloating   -MW    Opened Regional Lymph Nodes  inguinal;ribs  -MW    Inguinal  left;right  -MW    Ribs  Rt and Lt anterior   -MW    Opened Anastamoses  axillo-inguinal  -MW    Axillo-Inguinal  left;right  -MW    Extremity Treatment  simple/brief MLD;MLD to proximal limb only  -MW    Simple/Brief MLD  RUE  -MW    MLD to Proximal Limb Only  LUE   -MW    Astym  mastectomy protocol  -    Mastectomy Protocol  supine;sitting  -    Mastectomy Protocol Comment  Initiated treatment seated on stool with upper body resting on face craddle to focus on Rt & Lt upper back/ scapular area with evaluator and localizer; extra strokes to supraspinatus, leveator scap, and teres mm groups. Continued strokes into lateral trunk adjacent to breast area. In supine: Evaluator and localizer to Rt and Lt chest, working around port on Lt and gently over areas of tenderness. Mild to moderate soft tissue disruptions Rt chest, and mild to moderate Lt chest. Continued ASTYM into lateral trunk / ribs; mild soft tissue disruptions on Rt and Lt. Repositioned in shoulder flex/ ABD to open axilla (partially able to achieve position bilat) and work on pectoralis tightness, soft tissue restrictions (\"pockets\") extra strokes bilaterally.   -MW    Manual Lymphatic Drainage Comments  MLD mixed with STM post ASTYM working around trunk with position changes.   -MW          Compression/Skin Care  skin care;wrapping location;bandaging;compression garment  -    Skin Care  moisturizing lotion applied residual cocoa butter on trunk   -    Wrapping Location  upper extremity  -    Compression Garment Comments  pt to don sleeves at home " (forgot on way to injection appt prior to PT)   -      User Key  (r) = Recorded By, (t) = Taken By, (c) = Cosigned By    Initials Name Provider Type    Jennifer Solorio, PT Physical Therapist                        PT Assessment/Plan     Row Name 01/07/19 1400          PT Assessment    Functional Limitations  Performance in self-care ADL;Limitation in home management;Performance in work activities  -     Impairments  Edema;Impaired lymphatic circulation;Impaired postural alignment;Muscle strength;Pain;Range of motion;Sensation  -     Assessment Comments  Lt chest and axilla skin with good improvement and recovery from XRT. Some increased tenderness Lt chest/ axilla, especially at subscapularis and teres mm areas. Rt side stable. Bilateral UE lymphedema remains stable with return to work; pt continues to use compression pump and sleeves daily. Cont PT to regain function LUE and transition into work.   -     Rehab Potential  Good  -MW     Patient/caregiver participated in establishment of treatment plan and goals  Yes  -MW     Patient would benefit from skilled therapy intervention  Yes  -MW        PT Plan    PT Frequency  1x/week  -     Physical Therapy Interventions (Optional Details)  manual therapy techniques;manual lymphatic drainage;postural re-education;ROM (Range of Motion);stretching;strengthening;home exercise program  -     PT Plan Comments  Cont ASTYM/ STM/ MLD; cont to progress ther exer / HEP for max function; monitor BUE lymphedema with return to work   -       User Key  (r) = Recorded By, (t) = Taken By, (c) = Cosigned By    Initials Name Provider Type    Jennifer Solorio, PT Physical Therapist               Exercises     Row Name 01/07/19 1400             Subjective Comments    Subjective Comments  Pt reports more sore all over from Lupus and returning to work (back, neck, knees, hips)   -         Subjective Pain    Able to rate subjective pain?  yes  -MW      Pre-Treatment  "Pain Level  6  -MW      Post-Treatment Pain Level  4  -MW      Subjective Pain Comment  \"Pockets sore\"   -MW         Total Minutes    22494 - PT Manual Therapy Minutes  55  -MW         Exercise 1    Exercise Name 1  RT HBH wings   -MW      Cueing 1  Tactile  -MW      Reps 1  9  -MW      Additional Comments  concurrent with Manual therapy  -MW         Exercise 2    Exercise Name 2  LT HBH wings   -MW      Cueing 2  Tactile  -MW      Reps 2  6  -MW      Additional Comments  concurrent with Manual therapy; gentle pressures   -MW        User Key  (r) = Recorded By, (t) = Taken By, (c) = Cosigned By    Initials Name Provider Type    Jennifer Solorio, PT Physical Therapist                       Manual Rx (last 36 hours)      Manual Treatments     Row Name 01/07/19 1400             Total Minutes    45697 - PT Manual Therapy Minutes  55  -MW         Manual Rx 1    Manual Rx 1 Location  Bilat chest / mastectomy sites   -MW      Manual Rx 1 Type  STM with tissue bending, lifting, rolling and space correction techniques at lateral pectoralis border and at incision lines   -MW      Manual Rx 1 Grade  mod Rt, gentle Lt   -MW      Manual Rx 1 Duration  15  -MW         Manual Rx 2    Manual Rx 2 Location  RT & LT upper trap, mid trap, leveator scap and teres mm groups wrapping around into posterior axilla   -MW      Manual Rx 2 Type  STM with tissue bending, lifting and space correction techniques; trigger point release at subscap / posterior axillary fold    -MW      Manual Rx 2 Grade  gentle to moderate   -MW      Manual Rx 2 Duration  25  -MW        User Key  (r) = Recorded By, (t) = Taken By, (c) = Cosigned By    Initials Name Provider Type    Jennifer Solorio, PT Physical Therapist              Therapy Education  Education Details: Cont HEP and lymphedema care; encouraged pt to settle in to new schedule with work prior to attending [employer] program.   Given: Symptoms/condition management  Program: Reinforced  How " Provided: Verbal  Provided to: Patient  Level of Understanding: Verbalized              Time Calculation:   Start Time: 1400  Total Timed Code Minutes- PT: 55 minute(s)   Therapy Suggested Charges     Code   Minutes Charges    92024 (CPT®) Hc Pt Neuromusc Re Education Ea 15 Min      17028 (CPT®) Hc Pt Ther Proc Ea 15 Min      55247 (CPT®) Hc Gait Training Ea 15 Min      20950 (CPT®) Hc Pt Therapeutic Act Ea 15 Min      54395 (CPT®) Hc Pt Manual Therapy Ea 15 Min 55 4    59748 (CPT®) Hc Pt Ther Massage- Per 15 Min      21841 (CPT®) Hc Pt Iontophoresis Ea 15 Min      94522 (CPT®) Hc Pt Elec Stim Ea-Per 15 Min      07325 (CPT®) Hc Pt Ultrasound Ea 15 Min      91436 (CPT®) Hc Pt Self Care/Mgmt/Train Ea 15 Min      83779 (CPT®) Hc Pt Prosthetic (S) Train Initial Encounter, Each 15 Min      43767 (CPT®) Hc Orthotic(S) Mgmt/Train Initial Encounter, Each 15min      48970 (CPT®) Hc Pt Aquatic Therapy Ea 15 Min      62073 (CPT®) Hc Pt Orthotic(S)/Prosthetic(S) Encounter, Each 15 Min       (CPT®) Hc Pt Electrical Stim Unattended      Total  55 4        Therapy Charges for Today     Code Description Service Date Service Provider Modifiers Qty    20606796394 HC PT MANUAL THERAPY EA 15 MIN 1/7/2019 Jennifer Noriega, PT GP 4                    Jennifer Noriega, PT  1/7/2019

## 2019-01-14 ENCOUNTER — HOSPITAL ENCOUNTER (OUTPATIENT)
Dept: PHYSICAL THERAPY | Facility: HOSPITAL | Age: 49
Setting detail: THERAPIES SERIES
Discharge: HOME | End: 2019-01-14
Attending: SURGERY

## 2019-01-14 ENCOUNTER — TELEPHONE (OUTPATIENT)
Dept: GASTROENTEROLOGY | Facility: CLINIC | Age: 49
End: 2019-01-14

## 2019-01-14 DIAGNOSIS — L90.5 SCAR CONDITIONS AND FIBROSIS OF SKIN: ICD-10-CM

## 2019-01-14 DIAGNOSIS — I89.0 LYMPHEDEMA OF LEFT UPPER EXTREMITY: ICD-10-CM

## 2019-01-14 DIAGNOSIS — I89.0 LYMPHEDEMA OF RIGHT UPPER EXTREMITY: ICD-10-CM

## 2019-01-14 DIAGNOSIS — R68.89 IMPAIRED FUNCTION OF UPPER EXTREMITY: ICD-10-CM

## 2019-01-14 DIAGNOSIS — N64.4 MASTODYNIA OF LEFT BREAST: Primary | ICD-10-CM

## 2019-01-14 PROCEDURE — 97140 MANUAL THERAPY 1/> REGIONS: CPT | Performed by: PHYSICAL THERAPIST

## 2019-01-14 NOTE — TELEPHONE ENCOUNTER
Domitila,  Gave patient her lab results. Patient stated that [facility] called and they was having problems with her insurance. She will call [facility] back today to try and schedule again.

## 2019-01-14 NOTE — THERAPY TREATMENT NOTE
"    Outpatient Physical Therapy Lymphedema Treatment Note  [facility]     Patient Name: Tia Sommers  : 1970  MRN: [id number]  Today's Date: 2019        Visit Date: 2019    Visit Dx:    ICD-10-CM ICD-9-CM   1. Mastodynia of left breast N64.4 611.71   2. Scar conditions and fibrosis of skin L90.5 709.2   3. Lymphedema of left upper extremity I89.0 457.1   4. Lymphedema of right upper extremity I89.0 457.1   5. Impaired function of upper extremity R68.89 V49.5       Patient Active Problem List   Diagnosis   • Malignant neoplasm of upper-inner quadrant of left breast in female, estrogen receptor positive (CMS/HCC)   • Epigastric pain   • Elevated LFTs   • Abnormal CT of the chest   • Malignant neoplasm of left breast in female, estrogen receptor positive (CMS/HCC)   • History of ITP   • Lupus        Lymphedema     Row Name 19 1400          Able to rate subjective pain?  yes  -MW    Pre-Treatment Pain Level  3  -MW    Post-Treatment Pain Level  3  -MW    Subjective Pain Comment  \"Lt pocket\"  -MW          Subjective Comments  Pt reports having more soreness in Rt shoulder and Lt hip; Lt wrist sore with carrying weight.   -MW          Ptting Edema Category  By severity  -MW    Pitting Edema  Moderate  -MW    Edema Assessment Comment  Mild to moderate Rt lateral trunk fullness; area of thickened rough fullness superior to lateral aspect of incision line stable, but tender. LT chest with mild to moderate soft non pitting fullness adjacent to incision line, and lateral trunk with mild to moderate fullness. UE's appear to be at baseline RT > LT lymphedema   -MW          Location/Assessment  Upper Extremity;Upper Quadrant  -MW    Upper Extremity Conditions  bilateral:;intact;clean;left:;other (comment) LT multiple tatoos  -MW    Upper Extremity Color/Pigment  bilateral:;normal  -MW    Upper Quadrant Conditions  bilateral:;intact;left:  -MW    Upper Quadrant Color/Pigment  " "bilateral:;hyperpigmented bilat chest with XRT color changes   -          Manual Lymphatic Drainage  initial sequence;opened regional lymph nodes;opened anastamoses;extremity treatment;astym  -    Initial Sequence  supraclavicular;shoulder collectors  -MW    Supraclavicular  left;right  -MW    Shoulder Collectors  left;right  -MW    Abdomen  -- deferred due to fullness/ bloating   -MW    Opened Regional Lymph Nodes  inguinal;ribs  -MW    Inguinal  left;right  -MW    Ribs  Rt and Lt anterior   -MW    Opened Anastamoses  axillo-inguinal  -MW    Axillo-Inguinal  left;right  -MW    Extremity Treatment  simple/brief MLD;MLD to proximal limb only  -    Simple/Brief MLD  RUE/ LUE   -    Astym  mastectomy protocol  -    Mastectomy Protocol  supine;sitting  -    Mastectomy Protocol Comment  Initiated treatment seated on stool with upper body resting on face craddle to focus on Rt & Lt upper back/ scapular area with evaluator and localizer; extra strokes teres mm groups / poterior axillary fold. Continued strokes into lateral trunk adjacent to breast area. In supine: Evaluator and localizer to Rt and Lt chest, working around port on Lt. Mild  soft tissue disruptions Rt chest, and mild to moderate Lt chest. Continued ASTYM into lateral trunk / ribs; mild soft tissue disruptions on Rt and Lt. Repositioned in shoulder flex/ ABD to open axilla (partially able to achieve position bilat) and work on pectoralis tightness, soft tissue restrictions (\"pockets\") extra strokes bilaterally.   -    Manual Lymphatic Drainage Comments  MLD mixed with STM post ASTYM working around trunk with position changes.   -          Compression/Skin Care  skin care;wrapping location;bandaging;compression garment  -    Skin Care  moisturizing lotion applied residual cocoa butter on trunk   -    Wrapping Location  upper extremity  -    Compression Garment Comments  pt to don sleeves at home (forgot on way to injection appt prior to " PT)   -      User Key  (r) = Recorded By, (t) = Taken By, (c) = Cosigned By    Initials Name Provider Type    Jennifer Solorio, PT Physical Therapist                        PT Assessment/Plan     Row Name 01/14/19 1400          PT Assessment    Functional Limitations  Performance in self-care ADL;Limitation in home management;Performance in work activities  -     Impairments  Edema;Impaired lymphatic circulation;Impaired postural alignment;Muscle strength;Pain;Range of motion;Sensation  -     Assessment Comments  Pt continues to demonstrate improvement through decrease in soft tissue disruption / increased smoothness of soft tissues. Mild fullness Lt chest but no longer pitting. Lateral trunk fullness stable. Reassess shoulder ROM / flexibility next visit; begin to taper PT towards discharge to self care.   -MW     Rehab Potential  Good  -MW     Patient/caregiver participated in establishment of treatment plan and goals  Yes  -MW     Patient would benefit from skilled therapy intervention  Yes  -MW        PT Plan    PT Frequency  1x/week  -     Physical Therapy Interventions (Optional Details)  manual therapy techniques;manual lymphatic drainage;postural re-education;ROM (Range of Motion);stretching;strengthening;home exercise program  -     PT Plan Comments  Cont ASTYM/ STM/ MLD; cont to progress ther exer / HEP for max function; monitor BUE lymphedema with return to work; reassess POC next visit   -       User Key  (r) = Recorded By, (t) = Taken By, (c) = Cosigned By    Initials Name Provider Type    Jennifer Solorio, PT Physical Therapist               Exercises     Row Name 01/14/19 1400             Subjective Comments    Subjective Comments  Pt reports having more soreness in Rt shoulder and Lt hip; Lt wrist sore with carrying weight.   -MW         Subjective Pain    Able to rate subjective pain?  yes  -MW      Pre-Treatment Pain Level  3  -MW      Post-Treatment Pain Level  3  -MW       "Subjective Pain Comment  \"Lt pocket\"  -MW         Total Minutes    23289 - PT Manual Therapy Minutes  60  -MW         Exercise 1    Exercise Name 1  RT HBH wings   -MW      Cueing 1  Tactile  -MW      Reps 1  9  -MW      Additional Comments  concurrent with Manual therapy  -MW         Exercise 2    Exercise Name 2  LT HBH wings   -MW      Cueing 2  Tactile  -MW      Reps 2  6  -MW      Additional Comments  concurrent with Manual therapy  -MW        User Key  (r) = Recorded By, (t) = Taken By, (c) = Cosigned By    Initials Name Provider Type    Jennifer Solorio, PT Physical Therapist                       Manual Rx (last 36 hours)      Manual Treatments     Row Name 01/14/19 1400             Total Minutes    93379 - PT Manual Therapy Minutes  60  -MW         Manual Rx 1    Manual Rx 1 Location  Bilat chest / mastectomy sites   -MW      Manual Rx 1 Type  STM with tissue bending, lifting, rolling and space correction techniques at lateral pectoralis border and at incision lines   -MW      Manual Rx 1 Grade  gentle to moderate   -MW      Manual Rx 1 Duration  20  -MW         Manual Rx 2    Manual Rx 2 Location  RT & LT upper trap, mid trap, leveator scap and teres mm groups wrapping around into posterior axilla   -MW      Manual Rx 2 Type  STM with tissue bending, lifting and space correction techniques; trigger point release at subscap / posterior axillary fold    -MW      Manual Rx 2 Grade  gentle to moderate   -MW      Manual Rx 2 Duration  25  -MW        User Key  (r) = Recorded By, (t) = Taken By, (c) = Cosigned By    Initials Name Provider Type    Jennifer Solorio, PT Physical Therapist                             Time Calculation:   Start Time: 1400  Total Timed Code Minutes- PT: 60 minute(s)   Therapy Suggested Charges     Code   Minutes Charges    50159 (CPT®) Hc Pt Neuromusc Re Education Ea 15 Min      07903 (CPT®) Hc Pt Ther Proc Ea 15 Min      67235 (CPT®) Hc Gait Training Ea 15 Min      18641 (CPT®) " Hc Pt Therapeutic Act Ea 15 Min      08637 (CPT®) Hc Pt Manual Therapy Ea 15 Min 60 4    39178 (CPT®) Hc Pt Ther Massage- Per 15 Min      95671 (CPT®) Hc Pt Iontophoresis Ea 15 Min      32662 (CPT®) Hc Pt Elec Stim Ea-Per 15 Min      41328 (CPT®) Hc Pt Ultrasound Ea 15 Min      17697 (CPT®) Hc Pt Self Care/Mgmt/Train Ea 15 Min      77588 (CPT®) Hc Pt Prosthetic (S) Train Initial Encounter, Each 15 Min      89839 (CPT®) Hc Orthotic(S) Mgmt/Train Initial Encounter, Each 15min      95990 (CPT®) Hc Pt Aquatic Therapy Ea 15 Min      68099 (CPT®) Hc Pt Orthotic(S)/Prosthetic(S) Encounter, Each 15 Min       (CPT®) Hc Pt Electrical Stim Unattended      Total  60 4        Therapy Charges for Today     Code Description Service Date Service Provider Modifiers Qty    82943573437 HC PT MANUAL THERAPY EA 15 MIN 1/14/2019 Jennifer Noriega, PT GP 4                    Jennifer Noriega, PT  1/14/2019

## 2019-01-14 NOTE — TELEPHONE ENCOUNTER
----- Message from [place]ERNA[facility] [place] sent at 1/13/2019  9:07 AM EST -----  pls let pt know that lab work does not show that pt has diabetes.  When is pt scheduled for [facility]?

## 2019-01-28 ENCOUNTER — HOSPITAL ENCOUNTER (OUTPATIENT)
Dept: PHYSICAL THERAPY | Facility: HOSPITAL | Age: 49
Setting detail: THERAPIES SERIES
Discharge: HOME | End: 2019-01-28
Attending: SURGERY

## 2019-01-28 DIAGNOSIS — I89.0 LYMPHEDEMA OF LEFT UPPER EXTREMITY: ICD-10-CM

## 2019-01-28 DIAGNOSIS — R68.89 IMPAIRED FUNCTION OF UPPER EXTREMITY: ICD-10-CM

## 2019-01-28 DIAGNOSIS — L90.5 SCAR CONDITIONS AND FIBROSIS OF SKIN: ICD-10-CM

## 2019-01-28 DIAGNOSIS — N64.4 MASTODYNIA OF LEFT BREAST: Primary | ICD-10-CM

## 2019-01-28 DIAGNOSIS — I89.0 LYMPHEDEMA OF RIGHT UPPER EXTREMITY: ICD-10-CM

## 2019-01-28 PROCEDURE — 97140 MANUAL THERAPY 1/> REGIONS: CPT | Performed by: PHYSICAL THERAPIST

## 2019-01-29 NOTE — THERAPY PROGRESS REPORT/RE-CERT
"    Outpatient Physical Therapy Lymphedema Progress Note  [facility]     Patient Name: Tia Sommers  : 1970  MRN: [id number]  Today's Date: 2019        Visit Date: 2019    Visit Dx:    ICD-10-CM ICD-9-CM   1. Mastodynia of left breast N64.4 611.71   2. Scar conditions and fibrosis of skin L90.5 709.2   3. Lymphedema of left upper extremity I89.0 457.1   4. Lymphedema of right upper extremity I89.0 457.1   5. Impaired function of upper extremity R68.89 V49.5       Patient Active Problem List   Diagnosis   • Malignant neoplasm of upper-inner quadrant of left breast in female, estrogen receptor positive (CMS/HCC)   • Epigastric pain   • Elevated LFTs   • Abnormal CT of the chest   • Malignant neoplasm of left breast in female, estrogen receptor positive (CMS/HCC)   • History of ITP   • Lupus        Lymphedema     Row Name 19 1400          Able to rate subjective pain?  yes  -MW    Pre-Treatment Pain Level  6  -MW    Post-Treatment Pain Level  6  -MW    Subjective Pain Comment  \"pockets\" mostly   -MW          Subjective Comments  Sore all over too from working more.  Hasn't been able to start her Lupus medication as she is still getting her ins figured out (without ins coverage the med is $200).   -MW          Rt Shoulder Abduction AROM  0-115; tightness in chest/axilla   -MW    Rt Shoulder Flexion AROM  0-133; tightness   -MW    Rt Shoulder External Rotation AROM  HBH elbow to table lacks 25%   -MW    Rt Shoulder Internal Rotation AROM  hand to mid back; WFL   -MW          Lt Shoulder Abduction AROM  0-95; pain and tightness axilla   -MW    Lt Shoulder Flexion AROM  0-130; pain and tightness axilla   -MW    Lt Shoulder External Rotation AROM  HBH elbow lacks 20% to table   -MW    Lt Shoulder Internal Rotation AROM  hand to mid back; WFL   -MW          Ptting Edema Category  By severity  -MW    Pitting Edema  Moderate  -MW    Edema Assessment Comment  Mild to moderate Rt lateral trunk " fullness; area of thickened rough fullness superior to lateral aspect of incision line stable, but tender. LT chest with moderate soft non pitting fullness and tenderness adjacent to incision line, and lateral trunk with mild to moderate fullness. UE's appear to be at baseline RT > LT lymphedema   -MW          Location/Assessment  Upper Extremity;Upper Quadrant  -MW    Upper Extremity Conditions  bilateral:;intact;clean;left:;other (comment) LT multiple tatoos  -MW    Upper Extremity Color/Pigment  bilateral:;normal  -MW    Upper Quadrant Conditions  bilateral:;intact;clean  -MW    Upper Quadrant Color/Pigment  bilateral:;hyperpigmented bilat chest with XRT color changes   -MW          Axilla  33.2 cm  -MW    Mid upper arm  32.3 cm  -MW    Elbow  27.5 cm  -MW    Mid forearm  23.8 cm  -MW    Wrist crease  16.3 cm  -MW    Web space  19.4 cm  -MW    Met-heads  18.3 cm  -MW    LUE Quick Girth Total  170.8  -MW          Axilla  33.5 cm  -MW    Mid upper arm  34 cm  -MW    Elbow  28.5 cm  -MW    Mid forearm  26.3 cm  -MW    Wrist crease  16 cm  -MW    Web space  19.5 cm  -MW    Met-heads  19 cm  -MW    RUE Quick Girth Total  176.8  -MW          Manual Lymphatic Drainage  initial sequence;opened regional lymph nodes;opened anastamoses;extremity treatment;astym  -MW    Initial Sequence  supraclavicular;shoulder collectors  -MW    Supraclavicular  left;right  -MW    Shoulder Collectors  left;right  -MW    Abdomen  -- deferred due to fullness/ bloating   -MW    Opened Regional Lymph Nodes  inguinal;ribs  -MW    Inguinal  left;right  -MW    Ribs  Rt and Lt anterior   -MW    Opened Anastamoses  axillo-inguinal  -MW    Axillo-Inguinal  left;right  -MW    Extremity Treatment  simple/brief MLD;MLD to proximal limb only  -MW    Simple/Brief MLD  RUE/ LUE   -MW    Astym  mastectomy protocol  -MW    Mastectomy Protocol  supine;sitting  -MW    Mastectomy Protocol Comment  Initiated treatment seated on stool with upper body resting on  "face craddle to focus on Lt upper back/ scapular area with evaluator and localizer; extra strokes teres mm groups / poterior axillary fold. Continued strokes into lateral trunk adjacent to breast area. In supine: Evaluator and localizer to Rt and Lt chest, working around port on Lt. Mild soft tissue disruptions Rt chest, and mild to moderate Lt chest. Continued ASTYM into lateral trunk / ribs; mild soft tissue disruptions on Rt and Lt. Repositioned in shoulder flex/ ABD to open axilla (partially able to achieve position bilat) and work on pectoralis tightness, soft tissue restrictions (\"pockets\") extra strokes bilaterally. Very gentle on LT chest over areas of tenderness and fullness adjacent to incision lines.   -MW    Manual Lymphatic Drainage Comments  MLD mixed with STM post ASTYM working around trunk with position changes.   -MW          Compression/Skin Care  skin care;wrapping location;bandaging;compression garment  -    Skin Care  moisturizing lotion applied residual cocoa butter on trunk   -    Wrapping Location  upper extremity  -    Compression Garment Comments  assisted pt to don Bilat UE sleeves  -      User Key  (r) = Recorded By, (t) = Taken By, (c) = Cosigned By    Initials Name Provider Type    MW Jennifer Noriega, PT Physical Therapist                        PT Assessment/Plan     Row Name 01/28/19 1400          PT Assessment    Functional Limitations  Performance in self-care ADL;Limitation in home management;Performance in work activities  -     Impairments  Edema;Impaired lymphatic circulation;Impaired postural alignment;Muscle strength;Pain;Range of motion;Sensation  -     Assessment Comments  Pt has returned to work this month which has required her to increase her use of both UE's. Today she demonstrates increased circumferential measurements of both UEs however she has had her sleeves off for several hours and has not yet used her home compression pump; this may be her \"high " "end\" of normal. Her shoulder ROM demonstrates improvement in ER and flexion but decrease in ABD; ROM limited by soft tissue restrictions and pain. Her DASH score has also decreased this month with higher pain scores. Expect pt to continue to benefit from PT to regain ROM and strength and improve her QOL as well as work performance.   -MW     Rehab Potential  Good  -MW     Patient/caregiver participated in establishment of treatment plan and goals  Yes  -MW     Patient would benefit from skilled therapy intervention  Yes  -MW        PT Plan    PT Frequency  1x/week;Other (comment) decrease freq as progress resumes   -     Predicted Duration of Therapy Intervention (Therapy Eval)  8 visits   -MW     Planned CPT's?  PT MANUAL THERAPY EA 15 MIN: 80558;PT THER PROC EA 15 MIN: 44277;PT SELF CARE/MGMT/TRAIN 15 MIN: 37974  -     Physical Therapy Interventions (Optional Details)  manual therapy techniques;manual lymphatic drainage;postural re-education;ROM (Range of Motion);stretching;strengthening;home exercise program  -     PT Plan Comments  Cont ASTYM/ STM/ MLD; cont to progress ther exer / HEP for max function; monitor BUE lymphedema with return to work; may benefit from new compression sleeves if these are loosing tension.   -       User Key  (r) = Recorded By, (t) = Taken By, (c) = Cosigned By    Initials Name Provider Type    Jennifer Solorio, PT Physical Therapist               Exercises     Row Name 01/28/19 1400          Subjective Comments    Subjective Comments  Sore all over too from working more.  Hasn't been able to start her Lupus medication as she is still getting her ins figured out (without ins coverage the med is $200).   -MW        Subjective Pain    Able to rate subjective pain?  yes  -MW     Pre-Treatment Pain Level  6  -MW     Post-Treatment Pain Level  6  -MW     Subjective Pain Comment  \"pockets\" mostly   -MW        Total Minutes    84381 - PT Manual Therapy Minutes  55  -MW       User " Key  (r) = Recorded By, (t) = Taken By, (c) = Cosigned By    Initials Name Provider Type    Jennifer Solorio, PT Physical Therapist                       Manual Rx (last 36 hours)      Manual Treatments     Row Name 01/28/19 1400             Total Minutes    20294 - PT Manual Therapy Minutes  55  -MW         Manual Rx 1    Manual Rx 1 Location  Bilat chest / mastectomy sites   -MW      Manual Rx 1 Type  STM with tissue bending, lifting, rolling and space correction techniques at lateral pectoralis border and at incision lines   -MW      Manual Rx 1 Grade  gentle to moderate   -MW      Manual Rx 1 Duration  20  -MW         Manual Rx 2    Manual Rx 2 Location  RT & LT upper trap, mid trap, leveator scap and teres mm groups wrapping around into posterior axilla   -MW      Manual Rx 2 Type  STM with tissue bending, lifting and space correction techniques  -MW      Manual Rx 2 Grade  gentle to moderate   -MW      Manual Rx 2 Duration  15  -MW        User Key  (r) = Recorded By, (t) = Taken By, (c) = Cosigned By    Initials Name Provider Type    Jennifer Solorio, PT Physical Therapist          PT OP Goals     Row Name 01/28/19 1400       PT Short Term Goals    STG 1  Pt independent with basic HEP for shoulder ROM and posture.   -MW    STG 1 Progress  Met  -MW    STG 2  RUE lymphedema to decrease at least 10%.   -MW    STG 2 Progress  Partially Met;Ongoing  -MW    STG 3  LT shoulder flexibility to improve at least 15 degrees to improve self care and prepare for positioning for XRT.   -MW    STG 3 Progress  Met  -MW    STG 4  Pt to report decreased pain ratings on DASH to 3/5 or less.   -MW    STG 4 Progress  Met  -MW       Long Term Goals    LTG 1  Pt independent with advanced HEP for posture, flexibility and strengthening to promote safe self care.   -MW    LTG 1 Progress  Ongoing;Progressing  -MW    LTG 2  Pt to demonstrate improved function with at least 20% improvement on DASH score.   -MW    LTG 2 Progress   Met  -MW    LTG 3  Bilat shoulder flexion to be greater than 160 deg, for pt to be able to achieve positioning for XRT.   -MW    LTG 3 Progress  Ongoing  -MW    LTG 4  Pt measured for LUE arm sleeve; check fit   -MW    LTG 4 Progress  Met  -MW    LTG 5  Pt to be independent with self lymphedema care; including use of compression pump, compression sleeves/ gloves and skin care.   -MW    LTG 5 Progress  Met  -MW       Time Calculation    PT Goal Re-Cert Due Date  03/17/19  -MW      User Key  (r) = Recorded By, (t) = Taken By, (c) = Cosigned By    Initials Name Provider Type    Jennifer Solorio, PT Physical Therapist          Therapy Education  Education Details: Cont HEP; increase use of compression sleeves and pump as arms are more swollen  Given: Symptoms/condition management, Edema management  Program: Reinforced  How Provided: Verbal  Provided to: Patient  Level of Understanding: Verbalized    Outcome Measure Options: Disabilities of the Arm, Shoulder, and Hand (DASH)  DASH  Open a tight or new jar.: Severe Difficulty  Write: Mild Difficulty  Turn a key: Mild Difficulty  Prepare a meal: Mild Difficulty  Push open a heavy door: Moderate Difficulty  Place an object on a shelf above your head: Severe Difficulty  Do heavy household chores (e.g., wash walls, wash floors): Moderate Difficulty  Garden or do yard work: Moderate Difficulty  Make a bed: Mild Difficulty  Carry a shopping bag or briefcase: Moderate Difficulty  Carry a heavy object (over 10 lbs): Severe Difficulty  Change a lightbulb overhead: Severe Difficulty  Wash or blow dry your hair: Severe Difficulty  Wash your back: Severe Difficulty  Put on a pullover sweater: Moderate Difficulty  Use a knife to cut food: Mild Difficulty  Recreational activities in which require little effort (e.g., cardplaying, knitting, etc.): Moderate Difficulty  Recreational activities in which you take some force or impact through your arm, should or hand (e.g. golf,  hammering, tennis, etc.): Severe Difficulty  Recreational Activities in which you move your arm freely (e.g., frisbee, badminton, etc.): Severe Difficulty  Manage transportation needs (getting from one place to another): Moderate Difficulty  Sexual Activities: No Difficulty  During the past week, to what extent has your arm, shoulder, or hand problem interfered with your normal social activites with family, friends, neighbors or groups?: Quite a bit  During the past week, were you limited in your work or other regular daily activities as a result of your arm, shoulder or hand problem?: Very limited  Arm, Shoulder, or hand pain: Severe  Arm, shoulder or hand pain when you performed any specific activity: Severe  Tingling (pins and needles) in your arm, shoulder, or hand: Moderate  Weakness in your arm, shoulder or hand: Mild  Stiffness in your arm, shoulder or hand: Mild  During the past week, how much difficulty have you had sleeping because of the pain in your arm, shoulder or hand?: Mild Difficulty  I feel less capable, less confident or less useful because of my arm, shoulder or hand problem: Agree  DASH Sum : 93  Number of Questions Answered: 30  DASH Score: 52.5         Time Calculation:   Start Time: 1400  Total Timed Code Minutes- PT: 55 minute(s)   Therapy Suggested Charges     Code   Minutes Charges    06771 (CPT®) Hc Pt Neuromusc Re Education Ea 15 Min      36133 (CPT®) Hc Pt Ther Proc Ea 15 Min      09766 (CPT®) Hc Gait Training Ea 15 Min      46208 (CPT®) Hc Pt Therapeutic Act Ea 15 Min      36702 (CPT®) Hc Pt Manual Therapy Ea 15 Min 55 4    22758 (CPT®) Hc Pt Ther Massage- Per 15 Min      63860 (CPT®) Hc Pt Iontophoresis Ea 15 Min      45664 (CPT®) Hc Pt Elec Stim Ea-Per 15 Min      76455 (CPT®) Hc Pt Ultrasound Ea 15 Min      78257 (CPT®) Hc Pt Self Care/Mgmt/Train Ea 15 Min      07447 (CPT®) Hc Pt Prosthetic (S) Train Initial Encounter, Each 15 Min      75460 (CPT®) Hc Orthotic(S) Mgmt/Train Initial  Encounter, Each 15min      30870 (CPT®) Hc Pt Aquatic Therapy Ea 15 Min      33760 (CPT®) Hc Pt Orthotic(S)/Prosthetic(S) Encounter, Each 15 Min       (CPT®) Hc Pt Electrical Stim Unattended      Total  55 4        Therapy Charges for Today     Code Description Service Date Service Provider Modifiers Qty    [id number] HC PT MANUAL THERAPY EA 15 MIN 1/28/2019 Jennifer Noriega, PT GP 4          PT G-Codes  Outcome Measure Options: Disabilities of the Arm, Shoulder, and Hand (DASH)         Jennifer Noriega, PT  1/29/2019

## 2019-02-01 ENCOUNTER — APPOINTMENT (OUTPATIENT)
Dept: RADIATION ONCOLOGY | Facility: HOSPITAL | Age: 49
End: 2019-02-01

## 2019-02-06 ENCOUNTER — TELEPHONE (OUTPATIENT)
Dept: ONCOLOGY | Facility: CLINIC | Age: 49
End: 2019-02-06

## 2019-02-06 NOTE — TELEPHONE ENCOUNTER
----- Message from BERNABE Burkett sent at 2/6/2019  2:56 PM EST -----  Regarding: RE: LIMITATIONS NOTE  Contact: [phone]  I would recommend she speak with her physical therapist and I would go by their recommendations on any restrictions.  Thank you.  ----- Message -----  From: Bouchra Garcia MA  Sent: 2/5/2019   1:44 PM  To: BERNABE Burkett  Subject: RE: LIMITATIONS NOTE                             PT is wanting another letter like the one from 13Sept18.  I explained to her that we haven't seen her since November 2018 and PT states her range of motion has not changed since the letter from September 2018.  Her job is wanting her to take a test that uses range of motion and she doesn't feel comfortable in taking this test.    Let me know what you want me to do...    ~Shell      ----- Message -----  From: Nuvia Andrews APRN  Sent: 2/5/2019  11:37 AM  To: Bouchra Garcia MA  Subject: FW: LIMITATIONS NOTE                             Please find out what Tia is needing.    Thank you.  ----- Message -----  From: Winston Betancourt  Sent: 2/5/2019  10:06 AM  To: BERNABE Burkett  Subject: LIMITATIONS NOTE                                 Pt needs another impairment note like before if possbile

## 2019-02-11 ENCOUNTER — HOSPITAL ENCOUNTER (OUTPATIENT)
Dept: PHYSICAL THERAPY | Facility: HOSPITAL | Age: 49
Setting detail: THERAPIES SERIES
Discharge: HOME | End: 2019-02-11
Attending: SURGERY

## 2019-02-11 DIAGNOSIS — I89.0 LYMPHEDEMA OF LEFT UPPER EXTREMITY: ICD-10-CM

## 2019-02-11 DIAGNOSIS — N64.4 MASTODYNIA OF LEFT BREAST: Primary | ICD-10-CM

## 2019-02-11 DIAGNOSIS — L90.5 SCAR CONDITIONS AND FIBROSIS OF SKIN: ICD-10-CM

## 2019-02-11 DIAGNOSIS — I89.0 LYMPHEDEMA OF RIGHT UPPER EXTREMITY: ICD-10-CM

## 2019-02-11 DIAGNOSIS — R68.89 IMPAIRED FUNCTION OF UPPER EXTREMITY: ICD-10-CM

## 2019-02-11 PROCEDURE — 97140 MANUAL THERAPY 1/> REGIONS: CPT | Performed by: PHYSICAL THERAPIST

## 2019-02-11 NOTE — THERAPY TREATMENT NOTE
"    Outpatient Physical Therapy Lymphedema Treatment Note  [facility]     Patient Name: Tia Sommers  : 1970  MRN: [id number]  Today's Date: 2019        Visit Date: 2019    Visit Dx:    ICD-10-CM ICD-9-CM   1. Mastodynia of left breast N64.4 611.71   2. Scar conditions and fibrosis of skin L90.5 709.2   3. Lymphedema of left upper extremity I89.0 457.1   4. Lymphedema of right upper extremity I89.0 457.1   5. Impaired function of upper extremity R68.89 V49.5       Patient Active Problem List   Diagnosis   • Malignant neoplasm of upper-inner quadrant of left breast in female, estrogen receptor positive (CMS/HCC)   • Epigastric pain   • Elevated LFTs   • Abnormal CT of the chest   • Malignant neoplasm of left breast in female, estrogen receptor positive (CMS/HCC)   • History of ITP   • Lupus        Lymphedema     Row Name 19 1400          Able to rate subjective pain?  yes  -MW    Pre-Treatment Pain Level  6  -MW    Post-Treatment Pain Level  6  -MW    Subjective Pain Comment  Tender area Lt chest   -MW          Subjective Comments  Pt reports increase tenderness/ soreness Lt chest and armpit; especially at area of lumpy tissue.   -MW          Ptting Edema Category  By severity  -MW    Pitting Edema  Moderate  -MW    Edema Assessment Comment  Moderate fullness bilateral lateral chest/ trunk. Also note more fullness Lt chest inferior and superior to incision line; this area is also tender to palpation.   -MW          Location/Assessment  Upper Extremity;Upper Quadrant  -MW    Upper Extremity Conditions  bilateral:;intact;clean;left:;other (comment) LT multiple tatoos  -MW    Upper Extremity Color/Pigment  bilateral:;normal  -MW    Upper Quadrant Conditions  bilateral:;intact;clean  -MW    Upper Quadrant Color/Pigment  bilateral:;hyperpigmented bilat chest with XRT color changes   -MW    Skin Observations Comment  Lt ant-lateral fullness with tubular thickened tissue which is \"new\" per pt; " "area is very tender. Has similar area in Rt axila which is also tender.   -          Manual Lymphatic Drainage  initial sequence;opened regional lymph nodes;opened anastamoses;extremity treatment;astym  -    Initial Sequence  supraclavicular;shoulder collectors  -MW    Supraclavicular  left;right  -MW    Shoulder Collectors  left;right  -MW    Abdomen  -- deferred due to fullness/ bloating   -MW    Opened Regional Lymph Nodes  ribs  -MW    Inguinal  --  -MW    Ribs  Rt and Lt anterior   -MW    Opened Anastamoses  axillo-inguinal  -MW    Axillo-Inguinal  left;right  -MW    Extremity Treatment  simple/brief MLD;MLD to proximal limb only  -    Simple/Brief MLD  chest/ axillas bilat   -    Astym  mastectomy protocol  -    Mastectomy Protocol  supine;sitting  -    Mastectomy Protocol Comment  Initiated treatment seated on stool with upper body resting on face craddle to focus on Lt & Rt upper back/ scapular area with evaluator and localizer; extra strokes teres mm groups / poterior axillary fold. Continued strokes into lateral trunk adjacent to breast area. In supine: Evaluator and localizer to Rt and Lt chest, working around port on Lt and very gently over fullness adjacent to incision line. Mild soft tissue disruptions Rt chest, and mild to moderate Lt chest. Continued ASTYM into lateral trunk / ribs; mild soft tissue disruptions on Rt and Lt. Repositioned in shoulder flex/ ABD to open axilla (partially able to achieve position bilat) and work on pectoralis tightness, soft tissue restrictions (\"pockets\") extra strokes bilaterally.   -    Manual Lymphatic Drainage Comments  MLD mixed with STM post ASTYM working around trunk with position changes.   -          Compression/Skin Care  skin care;wrapping location;bandaging;compression garment  -    Skin Care  moisturizing lotion applied residual cocoa butter on trunk   -    Wrapping Location  upper extremity  -    Compression Garment Comments  pt " forgot sleeve and will don at home post pump use  -      User Key  (r) = Recorded By, (t) = Taken By, (c) = Cosigned By    Initials Name Provider Type    Jennifer Solorio, PT Physical Therapist                        PT Assessment/Plan     Row Name 02/11/19 1400          PT Assessment    Functional Limitations  Performance in self-care ADL;Limitation in home management;Performance in work activities  -     Impairments  Edema;Impaired lymphatic circulation;Impaired postural alignment;Muscle strength;Pain;Range of motion;Sensation  -     Assessment Comments  Pt returning after 1 week off; transitioned to every other week cycle. Pt with increased c/o pain on Lt chest especially in anterior axilla, lateral pectoral area of tightness but also adjacent to incision line. New tubular-nodular deep tissue in Lt anterior axilla which is very tender; similar to area in Rt axilla which is also tender. Pt with more pain and Lt chest, unable to tolerate tissue bending techniques with shoulder motion. Pt is now about 10 weeks out from finishing XRT. Encouraged pt to follow up with Rad Onc or Onc regarding increased pain, fullness and tissue texture changes.   -MW     Rehab Potential  Fair  -MW     Patient/caregiver participated in establishment of treatment plan and goals  Yes  -MW     Patient would benefit from skilled therapy intervention  Yes  -MW        PT Plan    PT Frequency  Other (comment) every other week   -     Physical Therapy Interventions (Optional Details)  manual therapy techniques;manual lymphatic drainage;postural re-education;ROM (Range of Motion);stretching;strengthening;home exercise program  -     PT Plan Comments  Monitor Lt axilla tenderness/ tissue changes; cont STM/ ASTYM and MLD as tolerated; progress HEP as pain improves  -       User Key  (r) = Recorded By, (t) = Taken By, (c) = Cosigned By    Initials Name Provider Type    Jennifer Solorio, PT Physical Therapist                Exercises     Row Name 02/11/19 1400             Subjective Comments    Subjective Comments  Pt reports increase tenderness/ soreness Lt chest and armpit; especially at area of lumpy tissue.   -MW         Subjective Pain    Able to rate subjective pain?  yes  -MW      Pre-Treatment Pain Level  6  -MW      Post-Treatment Pain Level  6  -MW      Subjective Pain Comment  Tender area Lt chest   -MW         Total Minutes    48463 - PT Manual Therapy Minutes  55  -MW         Exercise 1    Exercise Name 1  RT HB wings   -MW      Cueing 1  Tactile  -MW      Reps 1  9  -MW         Exercise 2    Exercise Name 2  LT HBH wings   -MW      Cueing 2  Tactile  -MW      Reps 2  6  -MW      Additional Comments  very gentle; no STM due to too painful   -MW        User Key  (r) = Recorded By, (t) = Taken By, (c) = Cosigned By    Initials Name Provider Type    Jennifer Solorio, PT Physical Therapist                       Manual Rx (last 36 hours)      Manual Treatments     Row Name 02/11/19 1400             Total Minutes    34660 - PT Manual Therapy Minutes  55  -MW         Manual Rx 1    Manual Rx 1 Location  Bilat chest / mastectomy sites   -MW      Manual Rx 1 Type  STM with tissue bending, lifting, rolling and space correction techniques at lateral pectoralis border and at incision lines   -MW      Manual Rx 1 Grade  gentle to moderate Rt; very gentle on Lt   -MW      Manual Rx 1 Duration  20  -MW         Manual Rx 2    Manual Rx 2 Location  RT & LT upper trap, mid trap, leveator scap and teres mm groups wrapping around into posterior axilla   -MW      Manual Rx 2 Type  STM with tissue bending, lifting and space correction techniques  -MW      Manual Rx 2 Grade  gentle to moderate   -MW      Manual Rx 2 Duration  15  -MW        User Key  (r) = Recorded By, (t) = Taken By, (c) = Cosigned By    Initials Name Provider Type    Jennifer Solorio, PT Physical Therapist              Therapy Education  Education Details: Pt  to follow up with Rad Onc or Onc regarding increased pain Lt axilla and new nodular tissue.  Cont gentle stretches at home and lymph pump   Given: Symptoms/condition management, Edema management  Program: Reinforced  How Provided: Verbal  Provided to: Patient  Level of Understanding: Verbalized              Time Calculation:   Start Time: 1400  Total Timed Code Minutes- PT: 55 minute(s)   Therapy Suggested Charges     Code   Minutes Charges    38207 (CPT®) Hc Pt Neuromusc Re Education Ea 15 Min      95009 (CPT®) Hc Pt Ther Proc Ea 15 Min      69079 (CPT®) Hc Gait Training Ea 15 Min      59409 (CPT®) Hc Pt Therapeutic Act Ea 15 Min      43721 (CPT®) Hc Pt Manual Therapy Ea 15 Min 55 4    62872 (CPT®) Hc Pt Ther Massage- Per 15 Min      05242 (CPT®) Hc Pt Iontophoresis Ea 15 Min      48320 (CPT®) Hc Pt Elec Stim Ea-Per 15 Min      93251 (CPT®) Hc Pt Ultrasound Ea 15 Min      70613 (CPT®) Hc Pt Self Care/Mgmt/Train Ea 15 Min      53857 (CPT®) Hc Pt Prosthetic (S) Train Initial Encounter, Each 15 Min      14513 (CPT®) Hc Orthotic(S) Mgmt/Train Initial Encounter, Each 15min      09033 (CPT®) Hc Pt Aquatic Therapy Ea 15 Min      23351 (CPT®) Hc Pt Orthotic(S)/Prosthetic(S) Encounter, Each 15 Min       (CPT®) Hc Pt Electrical Stim Unattended      Total  55 4        Therapy Charges for Today     Code Description Service Date Service Provider Modifiers Qty    09646166310 HC PT MANUAL THERAPY EA 15 MIN 2/11/2019 Jennifer Noriega, PT GP 4                    Jennifer Noriega, PT  2/11/2019

## 2019-02-18 ENCOUNTER — TELEPHONE (OUTPATIENT)
Dept: SOCIAL WORK | Facility: HOSPITAL | Age: 49
End: 2019-02-18

## 2019-02-18 NOTE — TELEPHONE ENCOUNTER
ROSELINE faxed requested medical records to [employer]  office on pt behalf.  SW working with pt to help with support and other working stressors that she is experiencing currently.  SW available for ongoing support and resource needs.

## 2019-02-19 ENCOUNTER — OFFICE VISIT (OUTPATIENT)
Dept: GASTROENTEROLOGY | Facility: CLINIC | Age: 49
End: 2019-02-19

## 2019-02-19 ENCOUNTER — HOSPITAL ENCOUNTER (OUTPATIENT)
Dept: ONCOLOGY | Facility: HOSPITAL | Age: 49
Setting detail: INFUSION SERIES
Discharge: HOME | End: 2019-02-19

## 2019-02-19 VITALS
TEMPERATURE: 97.9 F | HEIGHT: 66 IN | DIASTOLIC BLOOD PRESSURE: 86 MMHG | SYSTOLIC BLOOD PRESSURE: 146 MMHG | RESPIRATION RATE: 16 BRPM | WEIGHT: 180 LBS | HEART RATE: 111 BPM | BODY MASS INDEX: 28.93 KG/M2

## 2019-02-19 VITALS
HEIGHT: 66 IN | WEIGHT: 181 LBS | TEMPERATURE: 98.1 F | DIASTOLIC BLOOD PRESSURE: 70 MMHG | OXYGEN SATURATION: 99 % | SYSTOLIC BLOOD PRESSURE: 160 MMHG | HEART RATE: 94 BPM | BODY MASS INDEX: 29.09 KG/M2

## 2019-02-19 DIAGNOSIS — C50.212 MALIGNANT NEOPLASM OF UPPER-INNER QUADRANT OF LEFT BREAST IN FEMALE, ESTROGEN RECEPTOR POSITIVE (HCC): Primary | ICD-10-CM

## 2019-02-19 DIAGNOSIS — K21.9 CHRONIC GERD: Primary | ICD-10-CM

## 2019-02-19 DIAGNOSIS — Z79.52 LONG TERM SYSTEMIC STEROID USER: ICD-10-CM

## 2019-02-19 DIAGNOSIS — R10.13 EPIGASTRIC PAIN: ICD-10-CM

## 2019-02-19 DIAGNOSIS — R79.89 LFT ELEVATION: ICD-10-CM

## 2019-02-19 DIAGNOSIS — Z79.1 ENCOUNTER FOR LONG-TERM (CURRENT) USE OF NSAIDS: ICD-10-CM

## 2019-02-19 DIAGNOSIS — R10.13 DYSPEPSIA: ICD-10-CM

## 2019-02-19 DIAGNOSIS — Z17.0 MALIGNANT NEOPLASM OF UPPER-INNER QUADRANT OF LEFT BREAST IN FEMALE, ESTROGEN RECEPTOR POSITIVE (HCC): Primary | ICD-10-CM

## 2019-02-19 LAB
ALBUMIN SERPL-MCNC: 4.17 G/DL (ref 3.2–4.8)
ALBUMIN/GLOB SERPL: 1.4 G/DL (ref 1.5–2.5)
ALP SERPL-CCNC: 85 U/L (ref 25–100)
ALT SERPL W P-5'-P-CCNC: 31 U/L (ref 7–40)
ANION GAP SERPL CALCULATED.3IONS-SCNC: 7 MMOL/L (ref 3–11)
AST SERPL-CCNC: 28 U/L (ref 0–33)
BILIRUB SERPL-MCNC: 0.7 MG/DL (ref 0.3–1.2)
BUN BLD-MCNC: 9 MG/DL (ref 9–23)
BUN/CREAT SERPL: 11.8 (ref 7–25)
CALCIUM SPEC-SCNC: 9.1 MG/DL (ref 8.7–10.4)
CHLORIDE SERPL-SCNC: 105 MMOL/L (ref 99–109)
CO2 SERPL-SCNC: 27 MMOL/L (ref 20–31)
CREAT BLD-MCNC: 0.76 MG/DL (ref 0.6–1.3)
GFR SERPL CREATININE-BSD FRML MDRD: 98 ML/MIN/1.73
GLOBULIN UR ELPH-MCNC: 3 GM/DL
GLUCOSE BLD-MCNC: 92 MG/DL (ref 70–100)
LIPASE SERPL-CCNC: 22 U/L (ref 6–51)
POTASSIUM BLD-SCNC: 3.9 MMOL/L (ref 3.5–5.5)
PROT SERPL-MCNC: 7.2 G/DL (ref 5.7–8.2)
SODIUM BLD-SCNC: 139 MMOL/L (ref 132–146)

## 2019-02-19 PROCEDURE — 80053 COMPREHEN METABOLIC PANEL: CPT | Performed by: NURSE PRACTITIONER

## 2019-02-19 PROCEDURE — 83690 ASSAY OF LIPASE: CPT | Performed by: NURSE PRACTITIONER

## 2019-02-19 PROCEDURE — 36591 DRAW BLOOD OFF VENOUS DEVICE: CPT

## 2019-02-19 PROCEDURE — 99213 OFFICE O/P EST LOW 20 MIN: CPT | Performed by: NURSE PRACTITIONER

## 2019-02-19 RX ORDER — DEXLANSOPRAZOLE 60 MG/1
60 CAPSULE, DELAYED RELEASE ORAL EVERY MORNING
Qty: 30 CAPSULE | Refills: 2 | COMMUNITY
Start: 2019-02-19

## 2019-02-19 RX ORDER — SODIUM CHLORIDE 0.9 % (FLUSH) 0.9 %
10 SYRINGE (ML) INJECTION AS NEEDED
Status: DISCONTINUED | OUTPATIENT
Start: 2019-02-19 | End: 2019-02-20 | Stop reason: HOSPADM

## 2019-02-19 RX ORDER — SODIUM CHLORIDE 0.9 % (FLUSH) 0.9 %
10 SYRINGE (ML) INJECTION AS NEEDED
Status: CANCELLED | OUTPATIENT
Start: 2019-02-19

## 2019-02-19 RX ADMIN — Medication 10 ML: at 15:54

## 2019-02-19 RX ADMIN — HEPARIN 500 UNITS: 100 SYRINGE at 15:55

## 2019-02-19 NOTE — PROGRESS NOTES
"    GASTROENTEROLOGY OUTPATIENT ESTABLISHED PATIENT NOTE  Patient: TIA SOMMERS : 1970  Date of Service: 2019  CC: Follow-up    Assessment/Plan                                             ASSESSMENT & PLANS     Chronic GERD Sx not relieved w/ current PPI  Epigastric pain  Dyspepsia  -     DC Nexium 20 mg.  Start dexlansoprazole (DEXILANT) 60 MG capsule; Take 1 capsule by mouth Every Morning. Take first thing in the morning on an empty stomach.  Wait at least 30 min to 1hr before eating.  Samples given  -     Pancrelipase, Lip-Prot-Amyl, 49144 units capsule delayed-release particles; Take 3 caps WITH meals and 2 caps WITH snacks. Do not crush or chew capsules    Encounter for long-term (current) use of NSAIDs  Long term systemic steroid user for lupus  · Change PPI as above    LFT elevation  -     Comprehensive Metabolic Panel; Future  -     Lipase; Future    Tubular adenoma and hyperplastic polyp 2018  · Repeat colonoscopy by 2023 or sooner if clinically indicated     History of breast cancer s/p surgery, chemo, and XRT    Follow Up:Return in about 3 months (around 2019) for Recheck.      DISCUSSION: The above plan was delineated in details with patient and all questions and concerns were answered.  Patient is also given contact information.  Patient is to return as scheduled or sooner if new problems arise.   Subjective                                                     SUBJECTIVE   History of Present Illness  Ms. Tia Sommers is a 48 y.o. female who is here for Follow-up  Still lots of bloating. Constant \"crown\" Upper abd pain. Postprandial epigastric.  Has been follow dietary changes as previously discussed.  Continues to be on Nexium 20 mg  BM has been regular. Pt denies dark black stools or bright red blood in the stools, in the toilet bowl, or on the toilet tissue.  Takes only Norco 1 tabs once daily. Seldom ETOH. Hx of splenectomy d/t thromobocytopenia  Has breast cancer s/p " bilateral mastectomies 5/2018, chemo and XRT  Has now completed chemo and XRT  on Prednisone for lupus    Has not gotten GES done d/t insurance.  No DM  EGD, done on 9/10/18 by Dr. Osuna due to upper abdominal pain, showed esophagitis, but normal stomach and duodenum.    Colonoscopy, done on 9/10/18 by Dr. Osuna for screening, showed a sessile polyp (4 cm) in the descending colon and sessile polyp (0.6 cm) in the ascending colon.  Colon polyp bx showed tubular adenoma and hyperplastic polyp     ROS:Review of Systems   Constitutional: Negative.         Pt currently or recently takes NSAIDS ( (i.e Ibuprofen, Aleve, Advil, Exedrin, BC Powder, diclofenac, meloxicam, & Naproxen, etc)? Yes    Pt currently or recently takes abx? No   HENT: Negative.    Eyes: Negative.    Respiratory: Negative.    Cardiovascular: Negative.    Gastrointestinal: Positive for abdominal distention (bloating) and abdominal pain.   Endocrine: Negative.    Genitourinary: Negative.    Musculoskeletal: Positive for back pain.   Skin: Negative.    Allergic/Immunologic: Negative.    Neurological: Negative.    Hematological: Negative.    Psychiatric/Behavioral: Negative.    Subjective   PAST MED HX: Pt  has a past medical history of Arthritis, Breast cancer (CMS/[facility]) (2000), Drug therapy (2000), radiation therapy (2000), Lupus (2014), and Lymphedema.  PAST SURG HX: Pt  has a past surgical history that includes Breast lumpectomy (Right, 2000); Breast biopsy (Right); Esophagogastroduodenoscopy; diagnostic laparoscopy exploratory laparotomy; Venous Access Device (Port); Venous Access Device (Port) Removal (2003); Splenectomy, total; Bone biopsy; Colonoscopy; and BREAST MASTECTOMY BILATERAL WITH LEFT SENTINEL NODE BIOPSY (Bilateral, 5/16/2018).  FAM HX: family history is not on file.  SOC HX: Pt  reports that she quit smoking about 9 months ago. Her smoking use included cigarettes. She started smoking about 30 years ago. She smoked 0.25 packs per  day. she has never used smokeless tobacco. She reports that she drinks about 0.6 oz of alcohol per week. She reports that she does not use drugs.      Objective                                                           OBJECTIVE   Allergy: Pt is allergic to iodine; penicillins; and latex.  MEDS:•  esomeprazole (nexIUM) 20 MG capsule, Take 1 capsule by mouth Daily. Take first thing in the morning on an empty stomach.  Wait at least 30 min to 1hr before eating., Disp: 30 capsule, Rfl: 1  •  HYDROcodone-acetaminophen (NORCO) 5-325 MG per tablet, Take 1 tablet by mouth Every 6 (Six) Hours As Needed for Severe Pain  for up to 12 doses., Disp: 12 tablet, Rfl: 0  •  Leuprolide Acetate, 4 Month, (LUPRON DEPOT, 4-MONTH, IM), Inject  into the appropriate muscle as directed by prescriber., Disp: , Rfl:   •  naproxen sodium (ALEVE) 220 MG tablet, Take 440 mg by mouth 2 (Two) Times a Day As Needed., Disp: , Rfl:   •  predniSONE (DELTASONE) 20 MG tablet, Take 1 tablet by mouth Daily. (Patient taking differently: Take 20 mg by mouth Daily As Needed.), Disp: 30 tablet, Rfl: 1  No current facility-administered medications for this visit.   Lab Results   Component Value Date    WBC 3.90 10/30/2018    WBC 4.90 09/14/2018    WBC 4.50 08/24/2018    EOSABS 0.03 07/11/2018    HGB 11.5 10/30/2018    HGB 10.8 (L) 09/14/2018    HGB 11.6 08/24/2018    HCT 36.1 10/30/2018    HCT 34.5 09/14/2018    HCT 36.5 08/24/2018    MCV 86.2 10/30/2018    MCV 86.1 09/14/2018    MCV 85.8 08/24/2018    MCHC 31.9 (L) 10/30/2018    MCHC 31.4 (L) 09/14/2018    MCHC 31.8 (L) 08/24/2018     10/30/2018     09/14/2018     08/24/2018     Lab Results   Component Value Date     10/30/2018    K 3.7 10/30/2018     10/30/2018    CO2 27.0 10/30/2018    BUN 11 10/30/2018    BUN 7 (L) 09/14/2018    BUN 10 08/24/2018    CREATININE 0.64 10/30/2018    CREATININE 0.72 09/14/2018    CREATININE 0.69 08/24/2018    EGFRIFNONA 98 10/30/2018     EGFRIFNONA 86 09/14/2018    EGFRIFNONA 90 08/24/2018    GLUCOSE 103 (H) 10/30/2018    CALCIUM 8.6 (L) 10/30/2018    ANIONGAP 6.0 10/30/2018     Lab Results   Component Value Date    AST 50 (H) 10/30/2018    AST 37 (H) 09/14/2018    AST 29 08/24/2018          ALT 60 (H) 10/30/2018    ALT 25 09/14/2018    ALT 24 08/24/2018    ALKPHOS 86 10/30/2018    ALKPHOS 74 09/14/2018    ALKPHOS 104 (H) 08/24/2018    BILITOT 0.8 10/30/2018    BILITOT 0.5 09/14/2018    BILITOT 0.8 08/24/2018    ALBUMIN 3.80 10/30/2018    ALBUMIN 3.87 09/14/2018    ALBUMIN 4.21 08/24/2018     Lab Results   Component Value Date    GGT 89 (H) 10/30/2018    LIPASE 19 07/11/2018     Lab Results   Component Value Date    HGBA1C 5.40 12/21/2018    HGBA1C 5.20 05/15/2018     No results found for: INR  Lab Results   Component Value Date    HPYLORIBR Negative 08/22/2018     Wt Readings from Last 5 Encounters:   02/19/19 82.1 kg (181 lb)   02/18/19 81.6 kg (180 lb)   01/07/19 80.3 kg (177 lb)   01/04/19 80.1 kg (176 lb 8 oz)   12/21/18 79.4 kg (175 lb)   body mass index is 29.21 kg/m².,Temp: 98.1 °F (36.7 °C),BP: 160/70,Heart Rate: 94   Physical Exam   Abdominal:       Epigastric/restrosternal postprandial pain. TTP  Mid upper constant abd pain.  TTP     General Well developed; well nourished; no acute distress.   ENT Oral mucosa pink and moist without thrush or lesions.    GI Abd soft, NT, ND, normal active bowel sounds.  No HSM.  No abd hernia    Pt care team: Domitila KIDD & Sylvain Batista VANITA  02/19/19 2:50 PM  [facility]--Gastroenterology  [phone]    CC: , No Known   [facility] / [facility] [id number] FAX:None

## 2019-02-19 NOTE — PATIENT INSTRUCTIONS
Call me and let me know if Creon works for you so I can send in a prescription    Pancrelipase capsules  What is this medicine?  PANCRELIPASE (pan cre LI pase) helps to improve digestion of food by replacing digestive enzymes. This medicine is used to treat health conditions that cause your body to produce less of these enzymes.  This medicine may be used for other purposes; ask your health care provider or pharmacist if you have questions.  COMMON BRAND NAME(S): Cotazym S, Creon, Creon 10, Creon 20, Creon 5, Dygase, Ku-Zyme, Ku-Zyme HP, Kutrase, Lapase, Lipram, Lipram-CR, Lipram-PN, Lipram-UL, Palcaps, Pancrease, Pancrease MT, Pancreaze, Pancrecarb MS, Pancron, Pangestyme CN, Pangestyme EC, Pangestyme MT, Pangestyme UL, Panocaps, Panocaps MT, Pertzye, Ultracaps MT, Ultrase, Ultrase MT, Ultresa, Zenpep  What should I tell my health care provider before I take this medicine?  They need to know if you have any of these conditions:  -a history of intestinal blockage or a condition called 'fibrosing colonopathy'  -abnormally high uric acid in the blood  -diabetes  -gout  -kidney disease  -trouble swallowing capsules  -an unusual or allergic reaction to pancrelipase, pancreatin, pork, pork protein, other medicines, foods, dyes, or preservatives  -pregnant or trying to get pregnant  -breast-feeding  How should I use this medicine?  Take this medicine by mouth with a glass of water. Follow the directions on the prescription label. Take with food. Do not crush or chew the contents of the capsule. If you or your child have trouble swallowing, you may open the capsule and sprinkle the contents on soft foods that do not require chewing such as applesauce, pureed bananas, or pears. Swallow the mixture right away followed with water or juice. Do not store the mixture. If you are giving this medicine to an infant, you may sprinkle the contents directly into your child's mouth. Give the medicine right before each feeding of  formula or breast milk. Do not mix capsule contents directly into formula or breast milk. Make sure the medicine is swallowed completely and that no medicine is left in the mouth. Take your doses at regular intervals. Do not take your medicine more often than directed.  A special MedGuide will be given to you by the pharmacist with each prescription and refill of delayed-release capsules (Creon, Zenpep, or Pancreaze). Be sure to read this information carefully each time.  Talk to your pediatrician regarding the use of this medicine in children. While this medicine may be prescribed for children for selected conditions precautions do apply.  Overdosage: If you think you have taken too much of this medicine contact a poison control center or emergency room at once.  NOTE: This medicine is only for you. Do not share this medicine with others.  What if I miss a dose?  If you miss a dose, take it as soon as you can. If it is almost time for your next dose, take only that dose. Do not take double or extra doses.  What may interact with this medicine?  -acarbose  -antacids containing calcium or magnesium  -iron  -miglitol  This list may not describe all possible interactions. Give your health care provider a list of all the medicines, herbs, non-prescription drugs, or dietary supplements you use. Also tell them if you smoke, drink alcohol, or use illegal drugs. Some items may interact with your medicine.  What should I watch for while using this medicine?  Visit your doctor for regular check ups. Talk to your doctor before you change brands of this medicine. Each brand has different amounts of enzymes.  You may need to be on a special diet while taking this medicine. Also, ask your doctor how much water you need to drink.  This medicine may increase your chance of having a rare bowel disorder. The risk of having this condition may be reduced by following the dosing directions that your healthcare professional gives you.  Call your healthcare professional right away if you have any unusual or severe stomach pain.  This medicine may increase blood uric acid levels, for example, worsening of gout, or painful, swollen joints. Call your healthcare professional right away if you have any of these symptoms.  Be careful if you open the capsule. This medicine can irritate the lungs if you breathe it in. Also, do not hold the medicine in your mouth or chew it. This may cause mouth sores.  This medicine may affect blood sugar levels. If you have diabetes, check with your doctor or health care professional before you change your diet or the dose of your diabetic medicine.  Women should inform their doctor if they wish to become pregnant or think they might be pregnant.  What side effects may I notice from receiving this medicine?  Side effects that you should report to your doctor or health care professional as soon as possible:  -allergic reactions like skin rash, itching or hives, swelling of the face, lips, or tongue  -fever or chills, sore throat  -severe stomach pain or bloating  -shortness of breath  -skin rash  -trouble passing stool  -vomiting  Side effects that usually do not require medical attention (report to your doctor or health care professional if they continue or are bothersome):  -constipation or diarrhea  -cough  -dizziness  -headache  -nausea  -stomach gas  -stomach pain  -weight loss  This list may not describe all possible side effects. Call your doctor for medical advice about side effects. You may report side effects to FDA at 5-054-FDA-9903.  Where should I keep my medicine?  Keep out of the reach of children.  Store at room temperature between 15 and 25 degrees C (59 and 77 degrees F). Do not refrigerate. Protect from moisture. Throw away any unused medicine after the expiration date.  NOTE: This sheet is a summary. It may not cover all possible information. If you have questions about this medicine, talk to your  doctor, pharmacist, or health care provider.  © 2018 Elsevier/Gold Standard (2013-04-17 10:16:32)  Dexlansoprazole capsules  What is this medicine?  DEXLANSOPRAZOLE (dex samuel ONEL pra zole) prevents the production of acid in the stomach. It is used to treat gastroesophageal reflux disease (GERD) and inflammation of the esophagus.  This medicine may be used for other purposes; ask your health care provider or pharmacist if you have questions.  COMMON BRAND NAME(S): Dexdeenant Kapidex  What should I tell my health care provider before I take this medicine?  They need to know if you have any of these conditions:  -liver disease  -low levels of magnesium in the blood  -lupus  -an unusual or allergic reaction to dexlansoprazole, other medicines, foods, dyes, or preservatives  -pregnant or trying to get pregnant  -breast-feeding  How should I use this medicine?  Take this medicine by mouth. Swallow the capsules whole with a drink of water. Follow the directions on the prescription label. Do not crush or chew. Take your medicine at regular intervals. Do not take more often than directed.  If you have difficulty swallowing the capsules, you may open the capsule and sprinkle the contents on a tablespoon of applesauce. Do not crush the contents of the capsule into the food. Swallow the dose immediately after preparing it. Do not chew. Follow with a drink of water.  A special MedGuide will be given to you before each treatment. Be sure to read this information carefully each time.  Talk to your pediatrician regarding the use of this medicine in children. While this drug may be prescribed for children as young as 12 years for selected conditions, precautions do apply.  Overdosage: If you think you have taken too much of this medicine contact a poison control center or emergency room at once.  NOTE: This medicine is only for you. Do not share this medicine with others.  What if I miss a dose?  If you miss a dose, take it as soon as  you can. If it is almost time for your next dose, take only that dose. Do not take double or extra doses.  What may interact with this medicine?  Do not take this medicine with any of the following medications:  -nelfinavir  -rilpivirine  -Maxwell's Wort  This medicine may also interact with the following medications:  -certain antiviral medicines for HIV or AIDS like atazanavir, saquinavir, ritonavir  -certain medicines for fungal infections like ketoconazole, itraconazole, voriconazole  -dasatinib  -digoxin  -erlotinib  -iron salts  -methotrexate  -mycophenolate mofetil  -nilotinib  -tacrolimus  -warfarin  This list may not describe all possible interactions. Give your health care provider a list of all the medicines, herbs, non-prescription drugs, or dietary supplements you use. Also tell them if you smoke, drink alcohol, or use illegal drugs. Some items may interact with your medicine.  What should I watch for while using this medicine?  It can take several days before your stomach pain gets better. Check with your doctor or health care professional if your condition does not start to get better, or if it gets worse.  You may need blood work done while you are taking this medicine.  What side effects may I notice from receiving this medicine?  Side effects that you should report to your doctor or health care professional as soon as possible:  -allergic reactions like skin rash, itching or hives, swelling of the face, lips, or tongue  -bone, muscle or joint pain  -breathing problems  -chest pain or chest tightness  -dark yellow or brown urine  -dizziness  -fast, irregular heartbeat  -feeling faint or lightheaded  -fever or sore throat  -muscle spasm  -palpitations  -rash on cheeks or arms that gets worse in the sun  -redness, blistering, peeling or loosening of the skin, including inside the mouth  -seizures  -tremors  -unusual bleeding or bruising  -unusually weak or tired  -yellowing of the eyes or skin  Side  effects that usually do not require medical attention (report to your doctor or health care professional if they continue or are bothersome):  -constipation  -diarrhea  -dry mouth  -headache  -nausea  This list may not describe all possible side effects. Call your doctor for medical advice about side effects. You may report side effects to FDA at 6-544-FDA-7730.  Where should I keep my medicine?  Keep out of the reach of children.  Store at room temperature between 15 and 30 degrees C (59 and 86 degrees F). Protect from moisture. Throw away any unused medicine after the expiration date.  NOTE: This sheet is a summary. It may not cover all possible information. If you have questions about this medicine, talk to your doctor, pharmacist, or health care provider.  © 2018 Elsevier/Gold Standard (2017-01-18 16:22:08)

## 2019-02-21 ENCOUNTER — HOSPITAL ENCOUNTER (OUTPATIENT)
Dept: PHYSICAL THERAPY | Facility: HOSPITAL | Age: 49
Setting detail: THERAPIES SERIES
Discharge: HOME | End: 2019-02-21
Attending: SURGERY

## 2019-02-21 DIAGNOSIS — I97.2 POST-MASTECTOMY LYMPHEDEMA SYNDROME: ICD-10-CM

## 2019-02-21 DIAGNOSIS — R68.89 IMPAIRED FUNCTION OF UPPER EXTREMITY: ICD-10-CM

## 2019-02-21 DIAGNOSIS — L90.5 SCAR CONDITIONS AND FIBROSIS OF SKIN: ICD-10-CM

## 2019-02-21 DIAGNOSIS — I89.0 LYMPHEDEMA OF LEFT UPPER EXTREMITY: ICD-10-CM

## 2019-02-21 DIAGNOSIS — I89.0 LYMPHEDEMA OF RIGHT UPPER EXTREMITY: ICD-10-CM

## 2019-02-21 DIAGNOSIS — N64.4 MASTODYNIA OF LEFT BREAST: Primary | ICD-10-CM

## 2019-02-21 PROCEDURE — 97140 MANUAL THERAPY 1/> REGIONS: CPT | Performed by: PHYSICAL THERAPIST

## 2019-02-21 NOTE — THERAPY TREATMENT NOTE
Outpatient Physical Therapy Lymphedema Treatment Note  [facility]     Patient Name: Tia Sommers  : 1970  MRN: [id number]  Today's Date: 2019        Visit Date: 2019    Visit Dx:    ICD-10-CM ICD-9-CM   1. Mastodynia of left breast N64.4 611.71   2. Scar conditions and fibrosis of skin L90.5 709.2   3. Lymphedema of left upper extremity I89.0 457.1   4. Lymphedema of right upper extremity I89.0 457.1   5. Impaired function of upper extremity R68.89 V49.5   6. Post-mastectomy lymphedema syndrome I97.2 457.0       Patient Active Problem List   Diagnosis   • Malignant neoplasm of upper-inner quadrant of left breast in female, estrogen receptor positive (CMS/HCC)   • Epigastric pain   • Elevated LFTs   • Abnormal CT of the chest   • Malignant neoplasm of left breast in female, estrogen receptor positive (CMS/HCC)   • History of ITP   • Lupus        Lymphedema     Row Name 19 1400          Able to rate subjective pain?  yes  -MW    Pre-Treatment Pain Level  4  -MW    Post-Treatment Pain Level  4  -MW    Subjective Pain Comment  tender area Lt lateral chest/ axilla  -MW          Subjective Comments   Lt chest/ axilla where tissue is more full.   -MW          Ptting Edema Category  By severity  -MW    Pitting Edema  Moderate  -MW    Edema Assessment Comment  Mild to moderate fullness Rt lateral trunk/ chest; RUE moderate fullness (has not yet pumped today). Lt chest/ lateral trunk with moderate fullness and tenderness; LUE with mild fullness.   -MW       Upper Quadrant Conditions  bilateral:;intact;clean  -MW    Upper Quadrant Color/Pigment  bilateral:;hyperpigmented bilat chest with XRT color changes   -MW          Manual Lymphatic Drainage  initial sequence;opened regional lymph nodes;opened anastamoses;extremity treatment;astym  -MW    Initial Sequence  supraclavicular;shoulder collectors  -MW    Supraclavicular  left;right  -MW    Shoulder Collectors  left;right  -MW     "Abdomen  -- deferred due to fullness/ bloating   -MW    Opened Regional Lymph Nodes  ribs;inguinal  -MW    Inguinal  right;left  -MW    Ribs  Rt and Lt anterior   -MW    Opened Anastamoses  axillo-inguinal  -MW    Axillo-Inguinal  left;right  -MW    Extremity Treatment  MLD to proximal limb only;simple/brief MLD  -MW    Simple/Brief MLD  chest/ axillas bilat and RUE   -MW    MLD to Proximal Limb Only  LUE   -MW    Astym  mastectomy protocol  -    Mastectomy Protocol  supine;sitting  -    Mastectomy Protocol Comment  Initiated treatment seated on stool with upper body resting on face craddle to focus on Lt & Rt upper back/ scapular area with evaluator and localizer; extra strokes teres mm groups / poterior axillary fold. Continued strokes into lateral trunk adjacent to breast area. In supine: Evaluator and localizer to Rt and Lt chest, working around port on Lt and gently over fullness adjacent to incision line. Mild soft tissue disruptions Rt chest, and mild Lt chest, except at lateral area of fullness, more coures disruptions. Continued ASTYM into lateral trunk / ribs; mild soft tissue disruptions on Rt and Lt. Repositioned in shoulder flex/ ABD to open axilla (partially able to achieve position bilat) and work on pectoralis tightness, soft tissue restrictions (\"pockets\") extra strokes bilaterally - very gently on Lt.    -MW    Manual Lymphatic Drainage Comments  MLD mixed with STM post ASTYM working around trunk with position changes.   -MW          Compression/Skin Care  skin care;wrapping location;bandaging;compression garment  -    Skin Care  moisturizing lotion applied residual cocoa butter on trunk   -MW    Wrapping Location  upper extremity  -MW    Compression Garment Comments  pt donned sleeves post PT   -MW    Compression/Skin Care Comments  Pt to contact [facility] for new RUE sleeve (Juzo size 2 max)   -      User Key  (r) = Recorded By, (t) = Taken By, (c) = Cosigned By    Initials Name Provider " "Type    MW Jennifer Noriega, PT Physical Therapist                        PT Assessment/Plan     Row Name 02/21/19 1400          PT Assessment    Functional Limitations  Performance in self-care ADL;Limitation in home management;Performance in work activities  -     Impairments  Edema;Impaired lymphatic circulation;Impaired postural alignment;Muscle strength;Pain;Range of motion;Sensation  -     Assessment Comments  Pt with persistent increased fullness Lt chest and axilla with increased tenderness; pt to follow up with MD. Shoulder ROM stable but restricted by pain and soft tissue tightness. RUE seems more full today, but has not used pump yet today; recommend pt to follow up with [facility] to obtain new RUE arm sleeve to improve control as current sleeve is worn. Pt currently without insurance coverage due to lapse in \"paperwork to be filed\" by pt.   -     Rehab Potential  Fair  -     Patient/caregiver participated in establishment of treatment plan and goals  Yes  -     Patient would benefit from skilled therapy intervention  Yes  -MW        PT Plan    PT Frequency  Other (comment) every other week   -     Physical Therapy Interventions (Optional Details)  manual therapy techniques;manual lymphatic drainage;postural re-education;ROM (Range of Motion);stretching;strengthening;home exercise program  -     PT Plan Comments  Monitor Lt axilla tenderness/ tissue changes; cont STM/ ASTYM and MLD as tolerated; progress HEP as pain improves. Pt to follow up on insurance coverage and new arm sleeve (rt)   -       User Key  (r) = Recorded By, (t) = Taken By, (c) = Cosigned By    Initials Name Provider Type     Jennifer Noriega, PT Physical Therapist               Exercises     Row Name 02/21/19 1400             Subjective Comments    Subjective Comments   Lt chest/ axilla where tissue is more full.   -MW         Subjective Pain    Able to rate subjective pain?  yes  -MW      Pre-Treatment " Pain Level  4  -MW      Post-Treatment Pain Level  4  -MW      Subjective Pain Comment  tender area Lt lateral chest/ axilla  -MW         Total Minutes    83229 - PT Manual Therapy Minutes  55  -MW         Exercise 2    Exercise Name 2  LT HBH wings   -MW      Cueing 2  Tactile;Verbal  -MW      Reps 2  6  -MW      Additional Comments  very gentle (tender)   -MW        User Key  (r) = Recorded By, (t) = Taken By, (c) = Cosigned By    Initials Name Provider Type    Jennifer Solorio, PT Physical Therapist                       Manual Rx (last 36 hours)      Manual Treatments     Row Name 02/21/19 1400             Total Minutes    54450 - PT Manual Therapy Minutes  55  -MW         Manual Rx 1    Manual Rx 1 Location  Bilat chest / mastectomy sites   -MW      Manual Rx 1 Type  STM with tissue bending, lifting, rolling and space correction techniques at lateral pectoralis border and at incision lines   -MW      Manual Rx 1 Grade  gentle to moderate Rt; very gentle on Lt   -MW      Manual Rx 1 Duration  20  -MW         Manual Rx 2    Manual Rx 2 Location  RT & LT upper trap, mid trap, leveator scap and teres mm groups wrapping around into posterior axilla   -MW      Manual Rx 2 Type  STM with tissue bending, lifting and space correction techniques  -MW      Manual Rx 2 Grade  gentle to moderate   -MW      Manual Rx 2 Duration  15  -MW        User Key  (r) = Recorded By, (t) = Taken By, (c) = Cosigned By    Initials Name Provider Type    Jennifer Solorio, PT Physical Therapist              Therapy Education  Education Details:  notified pt that her Medicaid has been terminated per Medicaid office. Currently has no insurance coverage; pt to go to Medicaid office first thing Friday morning to complete paperwork. Jess Biggs (Onc SW) also aware and will attempt to assist pt prn. Pt to cont with home lymph pump, compression use, stretches. Pt to follow up with [facility] for new RUE sleeve.   Given:  Symptoms/condition management, Edema management  Program: Reinforced  How Provided: Verbal  Provided to: Patient  Level of Understanding: Verbalized              Time Calculation:   Start Time: 1400  Total Timed Code Minutes- PT: 55 minute(s)   Therapy Suggested Charges     Code   Minutes Charges    93248 (CPT®) Hc Pt Neuromusc Re Education Ea 15 Min      47174 (CPT®) Hc Pt Ther Proc Ea 15 Min      30266 (CPT®) Hc Gait Training Ea 15 Min      88678 (CPT®) Hc Pt Therapeutic Act Ea 15 Min      31807 (CPT®) Hc Pt Manual Therapy Ea 15 Min 55 4    01959 (CPT®) Hc Pt Ther Massage- Per 15 Min      12937 (CPT®) Hc Pt Iontophoresis Ea 15 Min      15329 (CPT®) Hc Pt Elec Stim Ea-Per 15 Min      31069 (CPT®) Hc Pt Ultrasound Ea 15 Min      57100 (CPT®) Hc Pt Self Care/Mgmt/Train Ea 15 Min      89445 (CPT®) Hc Pt Prosthetic (S) Train Initial Encounter, Each 15 Min      05206 (CPT®) Hc Orthotic(S) Mgmt/Train Initial Encounter, Each 15min      64091 (CPT®) Hc Pt Aquatic Therapy Ea 15 Min      50031 (CPT®) Hc Pt Orthotic(S)/Prosthetic(S) Encounter, Each 15 Min       (CPT®) Hc Pt Electrical Stim Unattended      Total  55 4        Therapy Charges for Today     Code Description Service Date Service Provider Modifiers Qty    [id number] HC PT MANUAL THERAPY EA 15 MIN 2/21/2019 Jennifer Noriega, PT GP 4                    Jennifer Noriega, PT  2/21/2019

## 2019-03-18 ENCOUNTER — TELEPHONE (OUTPATIENT)
Dept: RADIATION ONCOLOGY | Facility: HOSPITAL | Age: 49
End: 2019-03-18

## 2019-03-18 NOTE — TELEPHONE ENCOUNTER
Returned pt call and left her phone that both Vivi and  are both out of the office that she could come by anytime and drop off papers and we will get it signed for her

## 2019-03-21 ENCOUNTER — TELEPHONE (OUTPATIENT)
Dept: ONCOLOGY | Facility: CLINIC | Age: 49
End: 2019-03-21

## 2019-03-21 NOTE — TELEPHONE ENCOUNTER
Patient canceled lupron injection for 4-1-19 because her insurance is currently inactive.  She isn't taking arimidex either.  She has applied for medicaid, but hasn't heard back yet.  Spoke with Padma and Jess.  Padma is working on assistance for lupron and Jess will reach out to see if she can help with medicaid process.  Advised patient we will reevaluate in a couple weeks.  Will need to switch to tamoxifen per Dr. Valle if unable to continue lupron.  Verbalized understanding.

## 2019-04-01 ENCOUNTER — TELEPHONE (OUTPATIENT)
Dept: SOCIAL WORK | Facility: HOSPITAL | Age: 49
End: 2019-04-01

## 2019-04-01 RX ORDER — ANASTROZOLE 1 MG/1
1 TABLET ORAL DAILY
Qty: 30 TABLET | Refills: 11 | Status: SHIPPED | OUTPATIENT
Start: 2019-04-01

## 2019-04-01 NOTE — TELEPHONE ENCOUNTER
SW sent a request to [facility] requesting that pt anastrozole be covered under pt indigent program, as pt has no insurance.  SW available for ongoing support and resource needs.

## 2019-04-16 ENCOUNTER — HOSPITAL ENCOUNTER (OUTPATIENT)
Dept: ONCOLOGY | Facility: HOSPITAL | Age: 49
Setting detail: INFUSION SERIES
Discharge: HOME | End: 2019-04-16

## 2019-04-16 VITALS
WEIGHT: 191 LBS | BODY MASS INDEX: 30.7 KG/M2 | HEART RATE: 107 BPM | TEMPERATURE: 99 F | RESPIRATION RATE: 16 BRPM | HEIGHT: 66 IN | DIASTOLIC BLOOD PRESSURE: 120 MMHG | SYSTOLIC BLOOD PRESSURE: 178 MMHG

## 2019-04-16 DIAGNOSIS — C50.212 MALIGNANT NEOPLASM OF UPPER-INNER QUADRANT OF LEFT BREAST IN FEMALE, ESTROGEN RECEPTOR POSITIVE (HCC): Primary | ICD-10-CM

## 2019-04-16 DIAGNOSIS — Z17.0 MALIGNANT NEOPLASM OF UPPER-INNER QUADRANT OF LEFT BREAST IN FEMALE, ESTROGEN RECEPTOR POSITIVE (HCC): Primary | ICD-10-CM

## 2019-04-16 PROCEDURE — 25010000002 LEUPROLIDE ACETATE (3 MONTH) PER 3.75 MG: Performed by: NURSE PRACTITIONER

## 2019-04-16 PROCEDURE — 96402 CHEMO HORMON ANTINEOPL SQ/IM: CPT

## 2019-04-16 PROCEDURE — 96523 IRRIG DRUG DELIVERY DEVICE: CPT

## 2019-04-16 PROCEDURE — 96372 THER/PROPH/DIAG INJ SC/IM: CPT

## 2019-04-16 RX ORDER — HEPARIN SODIUM (PORCINE) LOCK FLUSH IV SOLN 100 UNIT/ML 100 UNIT/ML
500 SOLUTION INTRAVENOUS AS NEEDED
OUTPATIENT
Start: 2019-04-16

## 2019-04-16 RX ORDER — SODIUM CHLORIDE 0.9 % (FLUSH) 0.9 %
10 SYRINGE (ML) INJECTION AS NEEDED
OUTPATIENT
Start: 2019-04-16

## 2019-04-16 RX ORDER — SODIUM CHLORIDE 0.9 % (FLUSH) 0.9 %
10 SYRINGE (ML) INJECTION AS NEEDED
Status: DISCONTINUED | OUTPATIENT
Start: 2019-04-16 | End: 2019-04-17 | Stop reason: HOSPADM

## 2019-04-16 RX ADMIN — Medication 10 ML: at 14:36

## 2019-04-16 RX ADMIN — HEPARIN 500 UNITS: 100 SYRINGE at 14:36

## 2019-04-16 RX ADMIN — LEUPROLIDE ACETATE 11.25 MG: KIT at 14:44

## 2019-04-29 ENCOUNTER — DOCUMENTATION (OUTPATIENT)
Dept: PHYSICAL THERAPY | Facility: HOSPITAL | Age: 49
End: 2019-04-29

## 2019-04-29 DIAGNOSIS — L90.5 SCAR CONDITIONS AND FIBROSIS OF SKIN: ICD-10-CM

## 2019-04-29 DIAGNOSIS — N64.4 MASTODYNIA OF LEFT BREAST: Primary | ICD-10-CM

## 2019-04-29 DIAGNOSIS — R68.89 IMPAIRED FUNCTION OF UPPER EXTREMITY: ICD-10-CM

## 2019-04-29 DIAGNOSIS — I89.0 LYMPHEDEMA OF RIGHT UPPER EXTREMITY: ICD-10-CM

## 2019-04-29 DIAGNOSIS — I89.0 LYMPHEDEMA OF LEFT UPPER EXTREMITY: ICD-10-CM

## 2019-04-29 DIAGNOSIS — I97.2 POST-MASTECTOMY LYMPHEDEMA SYNDROME: ICD-10-CM

## 2019-07-10 DIAGNOSIS — Z17.0 MALIGNANT NEOPLASM OF UPPER-INNER QUADRANT OF LEFT BREAST IN FEMALE, ESTROGEN RECEPTOR POSITIVE (HCC): ICD-10-CM

## 2019-07-10 DIAGNOSIS — C50.212 MALIGNANT NEOPLASM OF UPPER-INNER QUADRANT OF LEFT BREAST IN FEMALE, ESTROGEN RECEPTOR POSITIVE (HCC): ICD-10-CM

## 2019-07-11 ENCOUNTER — APPOINTMENT (OUTPATIENT)
Dept: ONCOLOGY | Facility: HOSPITAL | Age: 49
End: 2019-07-11

## 2019-07-17 ENCOUNTER — TELEPHONE (OUTPATIENT)
Dept: ONCOLOGY | Facility: CLINIC | Age: 49
End: 2019-07-17

## 2019-07-17 NOTE — TELEPHONE ENCOUNTER
I called the patient to see how she was doing and to discuss other options.  She has not been able to receive her Lupron injections due to lack of insurance apparently per previous phone conversations that I see in epic.  I called to discuss with her switching to tamoxifen, no answer, I left her a voicemail to call me back.

## 2019-07-17 NOTE — TELEPHONE ENCOUNTER
----- Message from Benjy Salcedo sent at 7/10/2019  9:49 AM EDT -----  Regarding: RE: appt  I talked with patient., she doesn't have insurance., and she will not be here tomorrow.  She asked me to cancel the Lupron.  She said the only way she can come back is with financial assistance.   ----- Message -----  From: Nuvia Andrews APRN  Sent: 7/10/2019   9:17 AM  To: Benjy Salcedo  Subject: appt                                             Please get Ms. Sommers an appt for f/u with either me or Dr. Valle.  I think she cancelled her April appt as she had a lapse in insurance.  Hopefully now taken care of as they have asked me to sign her Lupron orders.  Thank you.

## 2019-07-19 ENCOUNTER — HOSPITAL ENCOUNTER (OUTPATIENT)
Dept: RADIATION ONCOLOGY | Facility: HOSPITAL | Age: 49
Setting detail: RADIATION/ONCOLOGY SERIES
Discharge: HOME | End: 2019-07-19

## 2019-07-26 RX ORDER — TAMOXIFEN CITRATE 20 MG/1
20 TABLET ORAL DAILY
Qty: 30 TABLET | Refills: 11 | Status: SHIPPED | OUTPATIENT
Start: 2019-07-26

## 2019-10-03 ENCOUNTER — APPOINTMENT (OUTPATIENT)
Dept: ONCOLOGY | Facility: HOSPITAL | Age: 49
End: 2019-10-03

## 2022-09-08 ENCOUNTER — OFFICE (OUTPATIENT)
Dept: URBAN - METROPOLITAN AREA CLINIC 10 | Facility: CLINIC | Age: 52
End: 2022-09-08

## 2022-09-08 VITALS
DIASTOLIC BLOOD PRESSURE: 86 MMHG | SYSTOLIC BLOOD PRESSURE: 154 MMHG | HEIGHT: 66 IN | WEIGHT: 202 LBS | HEART RATE: 117 BPM | OXYGEN SATURATION: 95 %

## 2022-09-08 DIAGNOSIS — R10.13 EPIGASTRIC PAIN: ICD-10-CM

## 2022-09-08 DIAGNOSIS — Z86.010 PERSONAL HISTORY OF COLONIC POLYPS: ICD-10-CM

## 2022-09-08 PROCEDURE — 99204 OFFICE O/P NEW MOD 45 MIN: CPT | Performed by: REGISTERED NURSE

## 2022-09-08 RX ORDER — POLYETHYLENE GLYCOL 3350, SODIUM SULFATE, SODIUM CHLORIDE, POTASSIUM CHLORIDE, ASCORBIC ACID, SODIUM ASCORBATE 140-9-5.2G
KIT ORAL
Qty: 1 | Refills: 0 | Status: COMPLETED
Start: 2022-09-08 | End: 2022-12-08

## 2022-09-08 RX ORDER — HYOSCYAMINE SULFATE 0.12 MG/1
TABLET ORAL; SUBLINGUAL
Qty: 90 | Refills: 3 | Status: ACTIVE
Start: 2022-09-08

## 2022-09-22 NOTE — RADIATION COMPLETION NOTES
Date: 11/30/2018    Referring Physician:Chad Valle MD    Patient: Tia Sommers     Medical Record Number: [id number]    Ms. Tia Sommers completed external beam radiation therapy today.      As you know, this patient is a 48 y.o.-year-old female who is been diagnosed with bilateral breast cancers.  Originally, a Stage IB (cT2, cN0, cM0, G2, ER: Positive, PA: Positive, HER2: Negative) of the right breast treated several years ago with a mastectomy followed by radiation; and more recently a Stage IIA (pT2, pN0, cM0, G2, ER: Positive, PA: Negative, HER2: Negative, Oncotype DX score: 28) involving the left breast.  She has been treated with a mastectomy, and she underwent a course of external beam radiation therapy to the LEFT chest wall as detailed below:    Dates of treatment:  10/16/2018 - 11/30/2018  Radiation Treatments     Treatment Site: Left Breast  Total Dose: 45 Gy delivered in 25 fractions of 180 cGy each to the left chest wall.  This was followed by an additional 16 Gy in 8 fractions of 200 cGy each to the scar.  Technique: Opposed tangential 6X photon fields were used to treat the chest wall, and 6MeV electrons were used to treat the scar.  1/2cm bolus was used to supplement the dose of radiation at the skin surface.  DIBH was utilized to improve cardiac sparing and to reduce the risk for long term toxicity.        TREATMENT COURSE AND TOLERANCE: She tolerated her radiation treatments very well.  She developed the expected early grade 1 skin erythema within the inframammary fold and the low axilla.  This was managed with topical moisturizers.  She also experienced grade 1 fatigue.  She had developed lymphedema in both of her upper extremities prior to receiving radiation.  She was seen continuously during treatment at physical therapy and was actively undergoing rehab.    FOLLOW-UP:  She was scheduled to return in 1 month for re-evaluation.      Thank you again for your referral of Ms. Tia SALAS  Last Appointment:  4/8/2022  Future Appointments   Date Time Provider Hernan Love   10/27/2022  1:00 PM MD Mariam Gutierrez [facility] [place].      Sincerely,    Chuck Fowler MD     Cc: Keven Dye MD

## 2022-12-14 ENCOUNTER — AMBULATORY SURGICAL CENTER (OUTPATIENT)
Dept: URBAN - METROPOLITAN AREA SURGERY 3 | Facility: SURGERY | Age: 52
End: 2022-12-14
Payer: MEDICARE

## 2022-12-14 ENCOUNTER — OFFICE (OUTPATIENT)
Dept: URBAN - METROPOLITAN AREA PATHOLOGY 22 | Facility: PATHOLOGY | Age: 52
End: 2022-12-14
Payer: MEDICARE

## 2022-12-14 VITALS
HEART RATE: 89 BPM | HEART RATE: 83 BPM | SYSTOLIC BLOOD PRESSURE: 144 MMHG | DIASTOLIC BLOOD PRESSURE: 82 MMHG | WEIGHT: 200 LBS | SYSTOLIC BLOOD PRESSURE: 152 MMHG | HEART RATE: 80 BPM | SYSTOLIC BLOOD PRESSURE: 153 MMHG | OXYGEN SATURATION: 99 % | DIASTOLIC BLOOD PRESSURE: 92 MMHG | SYSTOLIC BLOOD PRESSURE: 151 MMHG | DIASTOLIC BLOOD PRESSURE: 86 MMHG | HEIGHT: 66 IN | RESPIRATION RATE: 16 BRPM | OXYGEN SATURATION: 96 % | TEMPERATURE: 97.2 F | HEART RATE: 86 BPM | DIASTOLIC BLOOD PRESSURE: 88 MMHG | HEART RATE: 77 BPM | SYSTOLIC BLOOD PRESSURE: 161 MMHG | DIASTOLIC BLOOD PRESSURE: 93 MMHG | RESPIRATION RATE: 15 BRPM | RESPIRATION RATE: 18 BRPM | SYSTOLIC BLOOD PRESSURE: 145 MMHG | DIASTOLIC BLOOD PRESSURE: 85 MMHG | HEART RATE: 76 BPM

## 2022-12-14 DIAGNOSIS — Z86.010 PERSONAL HISTORY OF COLONIC POLYPS: ICD-10-CM

## 2022-12-14 DIAGNOSIS — K63.5 POLYP OF COLON: ICD-10-CM

## 2022-12-14 PROCEDURE — 45380 COLONOSCOPY AND BIOPSY: CPT | Mod: PT | Performed by: INTERNAL MEDICINE

## 2025-02-12 ENCOUNTER — OFFICE (OUTPATIENT)
Dept: URBAN - METROPOLITAN AREA CLINIC 10 | Facility: CLINIC | Age: 55
End: 2025-02-12
Payer: MEDICARE

## 2025-02-12 VITALS
WEIGHT: 208 LBS | DIASTOLIC BLOOD PRESSURE: 79 MMHG | HEIGHT: 66 IN | HEART RATE: 93 BPM | SYSTOLIC BLOOD PRESSURE: 123 MMHG

## 2025-02-12 DIAGNOSIS — R10.13 EPIGASTRIC PAIN: ICD-10-CM

## 2025-02-12 PROCEDURE — 99214 OFFICE O/P EST MOD 30 MIN: CPT | Performed by: INTERNAL MEDICINE

## 2025-02-12 RX ORDER — DICYCLOMINE HYDROCHLORIDE 10 MG/1
30 CAPSULE ORAL
Qty: 90 | Refills: 3 | Status: ACTIVE
Start: 2025-02-12

## 2025-02-13 LAB
COMP. METABOLIC PANEL (14): ALBUMIN: 4.4 G/DL (ref 3.8–4.9)
COMP. METABOLIC PANEL (14): ALKALINE PHOSPHATASE: 105 IU/L (ref 44–121)
COMP. METABOLIC PANEL (14): ALT (SGPT): 39 IU/L — HIGH (ref 0–32)
COMP. METABOLIC PANEL (14): AST (SGOT): 32 IU/L (ref 0–40)
COMP. METABOLIC PANEL (14): BILIRUBIN, TOTAL: 0.6 MG/DL (ref 0–1.2)
COMP. METABOLIC PANEL (14): BUN/CREATININE RATIO: 13 (ref 9–23)
COMP. METABOLIC PANEL (14): BUN: 9 MG/DL (ref 6–24)
COMP. METABOLIC PANEL (14): CALCIUM: 9.1 MG/DL (ref 8.7–10.2)
COMP. METABOLIC PANEL (14): CARBON DIOXIDE, TOTAL: 25 MMOL/L (ref 20–29)
COMP. METABOLIC PANEL (14): CHLORIDE: 103 MMOL/L (ref 96–106)
COMP. METABOLIC PANEL (14): CREATININE: 0.7 MG/DL (ref 0.57–1)
COMP. METABOLIC PANEL (14): EGFR: 101 ML/MIN/1.73 (ref 59–?)
COMP. METABOLIC PANEL (14): GLOBULIN, TOTAL: 3.4 G/DL (ref 1.5–4.5)
COMP. METABOLIC PANEL (14): GLUCOSE: 89 MG/DL (ref 70–99)
COMP. METABOLIC PANEL (14): POTASSIUM: 3.8 MMOL/L (ref 3.5–5.2)
COMP. METABOLIC PANEL (14): PROTEIN, TOTAL: 7.8 G/DL (ref 6–8.5)
COMP. METABOLIC PANEL (14): SODIUM: 144 MMOL/L (ref 134–144)
LIPASE: 12 U/L — LOW (ref 14–72)

## 2025-03-19 PROBLEM — K63.5 POLYP OF COLON: Status: ACTIVE | Noted: 2022-12-14

## 2025-04-04 ENCOUNTER — OFFICE (OUTPATIENT)
Dept: URBAN - METROPOLITAN AREA PATHOLOGY 12 | Facility: PATHOLOGY | Age: 55
End: 2025-04-04
Payer: MEDICARE

## 2025-04-04 ENCOUNTER — AMBULATORY SURGICAL CENTER (OUTPATIENT)
Dept: URBAN - METROPOLITAN AREA SURGERY 1 | Facility: SURGERY | Age: 55
End: 2025-04-04
Payer: MEDICARE

## 2025-04-04 DIAGNOSIS — R10.13 EPIGASTRIC PAIN: ICD-10-CM

## 2025-04-04 DIAGNOSIS — K29.50 UNSPECIFIED CHRONIC GASTRITIS WITHOUT BLEEDING: ICD-10-CM

## 2025-04-04 DIAGNOSIS — R14.0 ABDOMINAL DISTENSION (GASEOUS): ICD-10-CM

## 2025-04-04 PROCEDURE — 88313 SPECIAL STAINS GROUP 2: CPT | Performed by: PATHOLOGY

## 2025-04-04 PROCEDURE — 88305 TISSUE EXAM BY PATHOLOGIST: CPT | Performed by: PATHOLOGY

## 2025-04-04 PROCEDURE — 88341 IMHCHEM/IMCYTCHM EA ADD ANTB: CPT | Performed by: PATHOLOGY

## 2025-04-04 PROCEDURE — 88342 IMHCHEM/IMCYTCHM 1ST ANTB: CPT | Performed by: PATHOLOGY

## 2025-04-04 PROCEDURE — 43239 EGD BIOPSY SINGLE/MULTIPLE: CPT | Performed by: INTERNAL MEDICINE

## (undated) DEVICE — ADAPT ST INFUS ADMIN SYR 70IN

## (undated) DEVICE — DRSNG PAD ABD 8X10IN STRL

## (undated) DEVICE — SUCTION RESERVOIR 400CC LG VOL: Brand: CARDINAL HEALTH

## (undated) DEVICE — DRSNG TELFA PAD NONADH STR 1S 3X8IN

## (undated) DEVICE — 3M™ STERI-STRIP™ REINFORCED ADHESIVE SKIN CLOSURES, R1547, 1/2 IN X 4 IN (12 MM X 100 MM), 6 STRIPS/ENVELOPE: Brand: 3M™ STERI-STRIP™

## (undated) DEVICE — SUT SILK 2/0 TIES 18IN A185H

## (undated) DEVICE — MEDI-VAC NON-CONDUCTIVE SUCTION TUBING: Brand: CARDINAL HEALTH

## (undated) DEVICE — TOWEL,OR,DSP,ST,BLUE,STD,4/PK,20PK/CS: Brand: MEDLINE

## (undated) DEVICE — GLV SURG SENSICARE MICRO PF LF 7 STRL

## (undated) DEVICE — SPNG GZ WOVN 4X4IN 12PLY 10/BX STRL

## (undated) DEVICE — CANNULA,OXY,ADULT,SUPERSOFT,W/7'TUB,UC: Brand: MEDLINE

## (undated) DEVICE — SPNG GZ STRL 2S 4X4 12PLY

## (undated) DEVICE — ANTIBACTERIAL UNDYED BRAIDED (POLYGLACTIN 910), SYNTHETIC ABSORBABLE SUTURE: Brand: COATED VICRYL

## (undated) DEVICE — DRSNG TELFA ILND ADH 4X6IN

## (undated) DEVICE — SUT SILK 2/0 PS 18IN 1588H

## (undated) DEVICE — DRSNG WND BORDR/ADHS NONADHR/GZ LF 2X2IN STRL

## (undated) DEVICE — LEX GENERAL BREAST: Brand: MEDLINE INDUSTRIES, INC.

## (undated) DEVICE — DISH PETRI 3.5IN MD STRL LF

## (undated) DEVICE — INTENDED FOR TISSUE SEPARATION, AND OTHER PROCEDURES THAT REQUIRE A SHARP SURGICAL BLADE TO PUNCTURE OR CUT.: Brand: BARD-PARKER ® STAINLESS STEEL BLADES

## (undated) DEVICE — SPNG LAP PREWSH SFTPK 18X18IN STRL PK/5

## (undated) DEVICE — CAMERA/LASER ARM DRAPE: Brand: DEROYAL

## (undated) DEVICE — ELECTRD BLD EXT EDGE 1P COAT 4IN STRL

## (undated) DEVICE — SUT SILK 3/0 TIES 18IN A184H

## (undated) DEVICE — SUT SILK 3/0 SH CR8 18IN C013D

## (undated) DEVICE — MEDI-VAC YANKAUER SUCTION HANDLE W/BULBOUS TIP: Brand: CARDINAL HEALTH

## (undated) DEVICE — TOTAL TRAY, 16FR 10ML SIL FOLEY, URN: Brand: MEDLINE

## (undated) DEVICE — DRAIN JACKSON PRATT ROUND 15FR: Brand: CARDINAL HEALTH

## (undated) DEVICE — SUT MONOCRYL PLS ANTIB UND 3/0  PS1 27IN